# Patient Record
Sex: FEMALE | Race: WHITE | NOT HISPANIC OR LATINO | Employment: FULL TIME | URBAN - METROPOLITAN AREA
[De-identification: names, ages, dates, MRNs, and addresses within clinical notes are randomized per-mention and may not be internally consistent; named-entity substitution may affect disease eponyms.]

---

## 2017-04-13 ENCOUNTER — OFFICE VISIT (OUTPATIENT)
Dept: FAMILY MEDICINE | Facility: CLINIC | Age: 37
End: 2017-04-13
Payer: COMMERCIAL

## 2017-04-13 VITALS
OXYGEN SATURATION: 98 % | HEART RATE: 95 BPM | BODY MASS INDEX: 22.57 KG/M2 | DIASTOLIC BLOOD PRESSURE: 55 MMHG | SYSTOLIC BLOOD PRESSURE: 105 MMHG | HEIGHT: 71 IN | TEMPERATURE: 97.2 F | WEIGHT: 161.2 LBS

## 2017-04-13 DIAGNOSIS — R07.0 THROAT PAIN: Primary | ICD-10-CM

## 2017-04-13 DIAGNOSIS — J45.20 INTERMITTENT ASTHMA, UNCOMPLICATED: ICD-10-CM

## 2017-04-13 DIAGNOSIS — R05.9 COUGH: ICD-10-CM

## 2017-04-13 DIAGNOSIS — J34.89 SINUS PRESSURE: ICD-10-CM

## 2017-04-13 LAB
DEPRECATED S PYO AG THROAT QL EIA: NORMAL
MICRO REPORT STATUS: NORMAL
SPECIMEN SOURCE: NORMAL

## 2017-04-13 PROCEDURE — 87880 STREP A ASSAY W/OPTIC: CPT | Performed by: PHYSICIAN ASSISTANT

## 2017-04-13 PROCEDURE — 87081 CULTURE SCREEN ONLY: CPT | Performed by: PHYSICIAN ASSISTANT

## 2017-04-13 PROCEDURE — 99213 OFFICE O/P EST LOW 20 MIN: CPT | Performed by: PHYSICIAN ASSISTANT

## 2017-04-13 RX ORDER — DOXYCYCLINE 100 MG/1
100 CAPSULE ORAL 2 TIMES DAILY
Qty: 20 CAPSULE | Refills: 0 | Status: SHIPPED | OUTPATIENT
Start: 2017-04-13 | End: 2017-04-23

## 2017-04-13 RX ORDER — PREDNISONE 20 MG/1
40 TABLET ORAL DAILY
Qty: 10 TABLET | Refills: 0 | Status: SHIPPED | OUTPATIENT
Start: 2017-04-13 | End: 2017-04-18

## 2017-04-13 RX ORDER — CODEINE PHOSPHATE AND GUAIFENESIN 10; 100 MG/5ML; MG/5ML
1-2 SOLUTION ORAL EVERY 4 HOURS PRN
Qty: 120 ML | Refills: 0 | Status: SHIPPED | OUTPATIENT
Start: 2017-04-13 | End: 2017-08-27

## 2017-04-13 NOTE — NURSING NOTE
"Chief Complaint   Patient presents with     URI       Initial /55  Pulse 95  Temp 97.2  F (36.2  C) (Tympanic)  Ht 5' 11\" (1.803 m)  Wt 161 lb 3.2 oz (73.1 kg)  BMI 22.48 kg/m2 Estimated body mass index is 22.48 kg/(m^2) as calculated from the following:    Height as of this encounter: 5' 11\" (1.803 m).    Weight as of this encounter: 161 lb 3.2 oz (73.1 kg).  Medication Reconciliation: complete    "

## 2017-04-13 NOTE — PROGRESS NOTES
SUBJECTIVE:                                                    Era Garland is a 37 year old female who presents to clinic today for the following health issues:    ENT Symptoms             Symptoms: cc Present Absent Comment   Fever/Chills  x  Hasn't checked temp. Chills the last few days with sweats   Fatigue  x     Muscle Aches  x     Eye Irritation   x    Sneezing  x     Nasal Garrett/Drg  x  plugged   Sinus Pressure/Pain  x     Loss of smell  x     Dental pain  x  some   Sore Throat x x     Swollen Glands  x     Ear Pain/Fullness  x     Cough x x  productive   Wheeze  x     Chest Pain  x     Shortness of breath  x     Rash   x    Other  x  headache     Symptom duration:  1 week    Symptom severity:  severe   Treatments tried:  Musinex DM, Flonase, sinus rinse iburpofen - no ibuprofen this morning   Contacts:  work     Had tonsils out    Problem list and histories reviewed & adjusted, as indicated.  Additional history: as documented    Patient Active Problem List   Diagnosis     CARDIOVASCULAR SCREENING; LDL GOAL LESS THAN 160     Intermittent asthma     Lactose intolerance     Moderate major depression (H)     Axillary mass     IUD (intrauterine device) in place     Past Surgical History:   Procedure Laterality Date     LAPAROSCOPIC APPENDECTOMY  6/20/2012    Procedure: LAPAROSCOPIC APPENDECTOMY;  Laparoscopic Appendectomy;  Surgeon: Jorge Luis Tan MD;  [facility]: [facility], CERVICAL  age 24     MOUTH SURGERY      wisdom teeth       Social History   Substance Use Topics     Smoking status: Never Smoker     Smokeless tobacco: Never Used     Alcohol use Yes      Comment: 3 drinks weekly- quit with pregnancy     Family History   Problem Relation Age of Onset     Depression Mother      GASTROINTESTINAL DISEASE Mother      Allergies Father      Eye Disorder Father      Hypertension Father      OSTEOPOROSIS Paternal Grandmother      Hypertension Paternal Grandmother      Alzheimer Disease Paternal  "Grandfather      Asthma Brother      Depression Brother      Cardiovascular Maternal Grandfather      MI     Alcohol/Drug Maternal Grandmother            Reviewed and updated as needed this visit by clinical staff  Tobacco  Allergies  Meds  Problems  Med Hx  Surg Hx  Fam Hx  Soc Hx        Reviewed and updated as needed this visit by Provider  Allergies  Meds  Problems         ROS:  Other than noted above, general, HEENT, respiratory, cardiac and gastrointestinal systems are negative.     OBJECTIVE:                                                    /55  Pulse 95  Temp 97.2  F (36.2  C) (Tympanic)  Ht 5' 11\" (1.803 m)  Wt 161 lb 3.2 oz (73.1 kg)  SpO2 98%  BMI 22.48 kg/m2  Body mass index is 22.48 kg/(m^2).  GENERAL: healthy, alert and no distress  EYES: Eyes grossly normal to inspection, PERRL and conjunctivae and sclerae normal  HENT: ear canals and TM's normal, nose and mouth without ulcers or lesions POSITIVE significant nasal congestion. Postnasal drip. No tonsils  NECK: no adenopathy, no asymmetry, masses, or scars and thyroid normal to palpation  RESP: lungs clear to auscultation - no rales, rhonchi or wheezes  CV: regular rate and rhythm, normal S1 S2, no S3 or S4, no murmur, click or rub, no peripheral edema and peripheral pulses strong  ABDOMEN: soft, nontender, no hepatosplenomegaly, no masses and bowel sounds normal  MS: no gross musculoskeletal defects noted, no edema    Patient well appearing, breathing easily in clinic, speaking in full sentences easily, no signs of cyanosis or respiratory distress.      ASSESSMENT/PLAN:                                                      ASSESSMENT/PLAN:      ICD-10-CM    1. Throat pain R07.0 Strep, Rapid Screen     Beta strep group A culture     predniSONE (DELTASONE) 20 MG tablet   2. Sinus pressure J34.89 predniSONE (DELTASONE) 20 MG tablet     doxycycline Monohydrate 100 MG CAPS   3. Intermittent asthma, uncomplicated J45.20 predniSONE " (DELTASONE) 20 MG tablet   4. Cough R05 guaiFENesin-codeine (ROBITUSSIN AC) 100-10 MG/5ML SOLN solution     doxycycline Monohydrate 100 MG CAPS       Patient Instructions   Have plenty of rest and fluids  Humidified air can be very helpful with cough  Use inhaler every 4-6 hours as needed during this illness  Use robitussin at night - can make you groggy  Use nasal spray Afrin OTC for 3-4 days in each nostril for congestion  Prednisone for inflammation - can stop after a few days if improved. Do not take other NSAIDs with this.  Use antibiotics if needed over the weekend if not improving  Follow up if worsening symptoms or if not improving  Be seen urgently if worsening breathing  Or difficulty swallowing    Jennifer Trent PA-C  [facility]

## 2017-04-13 NOTE — PATIENT INSTRUCTIONS
Have plenty of rest and fluids  Humidified air can be very helpful with cough  Use inhaler every 4-6 hours as needed during this illness  Use robitussin at night - can make you groggy  Use nasal spray Afrin OTC for 3-4 days in each nostril for congestion  Prednisone for inflammation - can stop after a few days if improved. Do not take other NSAIDs with this.  Use antibiotics if needed over the weekend if not improving  Follow up if worsening symptoms or if not improving  Be seen urgently if worsening breathing  Or difficulty swallowing

## 2017-04-13 NOTE — MR AVS SNAPSHOT
After Visit Summary   4/13/2017    Era Garland    MRN: [id number]           Patient Information     Date Of Birth          1980        Visit Information        Provider Department      4/13/2017 8:40 AM Jennifer Trent PA-C [facility]        Today's Diagnoses     Throat pain    -  1    Sinus pressure        Intermittent asthma, uncomplicated        Cough          Care Instructions    Have plenty of rest and fluids  Humidified air can be very helpful with cough  Use inhaler every 4-6 hours as needed during this illness  Use robitussin at night - can make you groggy  Use nasal spray Afrin OTC for 3-4 days in each nostril for congestion  Prednisone for inflammation - can stop after a few days if improved. Do not take other NSAIDs with this.  Use antibiotics if needed over the weekend if not improving  Follow up if worsening symptoms or if not improving  Be seen urgently if worsening breathing  Or difficulty swallowing        Follow-ups after your visit        Who to contact     Normal or non-critical lab and imaging results will be communicated to you by [employer], letter or phone within 4 business days after the clinic has received the results. If you do not hear from us within 7 days, please contact the clinic through [employer] or phone. If you have a critical or abnormal lab result, we will notify you by phone as soon as possible.  Submit refill requests through [employer] or call your pharmacy and they will forward the refill request to us. Please allow 3 business days for your refill to be completed.          If you need to speak with a  for additional information , please call: [phone]             Additional Information About Your Visit        [employer] Information     [employer] gives you secure access to your electronic health record. If you see a primary care provider, you can also send messages to your care team and make appointments. If you have questions, please  "call your primary care clinic.  If you do not have a primary care provider, please call [phone] and they will assist you.        Care EveryWhere ID     This is your Care EveryWhere ID. This could be used by other organizations to access your [facility] medical records  [id number]        Your Vitals Were     Pulse Temperature Height Pulse Oximetry BMI (Body Mass Index)       95 97.2  F (36.2  C) (Tympanic) 5' 11\" (1.803 m) 98% 22.48 kg/m2        Blood Pressure from Last 3 Encounters:   04/13/17 105/55   09/22/16 107/62   09/09/16 107/64    Weight from Last 3 Encounters:   04/13/17 161 lb 3.2 oz (73.1 kg)   09/22/16 156 lb (70.8 kg)   09/09/16 157 lb (71.2 kg)              We Performed the Following     Beta strep group A culture     Strep, Rapid Screen          Today's Medication Changes          These changes are accurate as of: 4/13/17  9:32 AM.  If you have any questions, ask your nurse or doctor.               Start taking these medicines.        Dose/Directions    doxycycline Monohydrate 100 MG Caps   Used for:  Cough, Sinus pressure   Started by:  Jennifer Trent PA-C        Dose:  100 mg   Take 1 capsule (100 mg) by mouth 2 times daily for 10 days   Quantity:  20 capsule   Refills:  0       guaiFENesin-codeine 100-10 MG/5ML Soln solution   Commonly known as:  ROBITUSSIN AC   Used for:  Cough   Started by:  Jennifer Trent PA-C        Dose:  1-2 tsp.   Take 5-10 mLs by mouth every 4 hours as needed   Quantity:  120 mL   Refills:  0       predniSONE 20 MG tablet   Commonly known as:  DELTASONE   Used for:  Sinus pressure, Throat pain, Intermittent asthma, uncomplicated   Started by:  Jennifer Trent PA-C        Dose:  40 mg   Take 2 tablets (40 mg) by mouth daily for 5 days   Quantity:  10 tablet   Refills:  0            Where to get your medicines      These medications were sent to [facility], [place] - [address]  [address] [address] [address]     Phone:  " [phone]     doxycycline Monohydrate 100 MG Caps    predniSONE 20 MG tablet         Some of these will need a paper prescription and others can be bought over the counter.  Ask your nurse if you have questions.     Bring a paper prescription for each of these medications     guaiFENesin-codeine 100-10 MG/5ML Soln solution                Primary Care Provider    None Specified       No primary provider on file.        Thank you!     Thank you for choosing [facility]  for your care. Our goal is always to provide you with excellent care. Hearing back from our patients is one way we can continue to improve our services. Please take a few minutes to complete the written survey that you may receive in the mail after your visit with us. Thank you!             Your Updated Medication List - Protect others around you: Learn how to safely use, store and throw away your medicines at www.disposemymeds.org.          This list is accurate as of: 4/13/17  9:32 AM.  Always use your most recent med list.                   Brand Name Dispense Instructions for use    ACIDOPHILUS PO      Take 1 tablet by mouth daily.       ALBUTEROL IN      AS NEEDED       ALPRAZolam 0.5 MG tablet    XANAX    40 tablet    Take 1 tablet (0.5 mg) by mouth 3 times daily as needed for anxiety       ammonium lactate 12 % cream    AMLACTIN    385 g    Apply  topically 2 times daily. To the arms.       Benzoyl Peroxide 5.3 % Foam     60 g    Externally apply 1 pump topically daily       * buPROPion 150 MG 12 hr tablet    WELLBUTRIN SR    180 tablet    Take 1 tablet (150 mg) by mouth 2 times daily       * buPROPion 300 MG 24 hr tablet    WELLBUTRIN XL    90 tablet    Take 1 tablet (300 mg) by mouth every morning       citalopram 40 MG tablet    celeXA    135 tablet    Take 1.5 tablets (60 mg) by mouth daily       clindamycin 1 % lotion    CLINDAMAX    60 mL    Apply topically 2 times daily       doxycycline Monohydrate 100 MG Caps     20 capsule     Take 1 capsule (100 mg) by mouth 2 times daily for 10 days       fluticasone 50 MCG/ACT spray    FLONASE    1 Bottle    Spray 2 sprays into both nostrils daily       fluticasone-salmeterol 500-50 MCG/DOSE diskus inhaler    ADVAIR     Inhale 1 puff into the lungs every 12 hours.       guaiFENesin-codeine 100-10 MG/5ML Soln solution    ROBITUSSIN AC    120 mL    Take 5-10 mLs by mouth every 4 hours as needed       ibuprofen 400 MG tablet    ADVIL/MOTRIN    120 tablet    Take 1-2 tablets by mouth every 6 hours as needed (cramping).       LANsoprazole 30 MG CR capsule    PREVACID    180 capsule    Take 1 capsule (30 mg) by mouth 2 times daily       * order for DME     1 Device    Equipment being ordered: seasonal affective disorder UV lamp       * order for DME     2 Device    Equipment being ordered: Seasonal affective disorder  UV lamp - 10,000 LUX Narrow Band Blue type of light.       * order for DME     2 Device    Equipment being ordered: seasonal affective disorder UV lamp       predniSONE 20 MG tablet    DELTASONE    10 tablet    Take 2 tablets (40 mg) by mouth daily for 5 days       SUDAFED 12 HOUR PO      Take 1 tablet by mouth once.       VITAMIN C PO      Take 1,000 mg by mouth daily.       VITAMIN D3 PO      Take 2,000 Units by mouth daily.       * Notice:  This list has 5 medication(s) that are the same as other medications prescribed for you. Read the directions carefully, and ask your doctor or other care provider to review them with you.

## 2017-04-14 ASSESSMENT — ASTHMA QUESTIONNAIRES: ACT_TOTALSCORE: 18

## 2017-04-15 LAB
BACTERIA SPEC CULT: NORMAL
MICRO REPORT STATUS: NORMAL
SPECIMEN SOURCE: NORMAL

## 2017-07-17 ENCOUNTER — E-VISIT (OUTPATIENT)
Dept: OBGYN | Facility: CLINIC | Age: 37
End: 2017-07-17
Payer: COMMERCIAL

## 2017-07-17 DIAGNOSIS — N30.00 ACUTE CYSTITIS WITHOUT HEMATURIA: Primary | ICD-10-CM

## 2017-07-17 PROCEDURE — 99444 ZZC PHYSICIAN ONLINE EVALUATION & MANAGEMENT SERVICE: CPT | Performed by: OBSTETRICS & GYNECOLOGY

## 2017-07-17 RX ORDER — NITROFURANTOIN 25; 75 MG/1; MG/1
100 CAPSULE ORAL 2 TIMES DAILY
Qty: 14 CAPSULE | Refills: 0 | Status: SHIPPED | OUTPATIENT
Start: 2017-07-17 | End: 2017-08-27

## 2017-08-27 ENCOUNTER — HOSPITAL ENCOUNTER (EMERGENCY)
Facility: CLINIC | Age: 37
Discharge: HOME | End: 2017-08-27
Attending: EMERGENCY MEDICINE | Admitting: EMERGENCY MEDICINE
Payer: COMMERCIAL

## 2017-08-27 ENCOUNTER — APPOINTMENT (OUTPATIENT)
Dept: GENERAL RADIOLOGY | Facility: CLINIC | Age: 37
End: 2017-08-27
Attending: EMERGENCY MEDICINE
Payer: COMMERCIAL

## 2017-08-27 VITALS
DIASTOLIC BLOOD PRESSURE: 70 MMHG | RESPIRATION RATE: 10 BRPM | TEMPERATURE: 98.9 F | SYSTOLIC BLOOD PRESSURE: 113 MMHG | OXYGEN SATURATION: 95 %

## 2017-08-27 DIAGNOSIS — R00.2 PALPITATIONS: ICD-10-CM

## 2017-08-27 DIAGNOSIS — R07.9 CHEST PAIN, UNSPECIFIED TYPE: ICD-10-CM

## 2017-08-27 LAB
ANION GAP SERPL CALCULATED.3IONS-SCNC: 7 MMOL/L (ref 3–14)
BASOPHILS # BLD AUTO: 0.1 10E9/L (ref 0–0.2)
BASOPHILS NFR BLD AUTO: 1.1 %
BUN SERPL-MCNC: 11 MG/DL (ref 7–30)
CALCIUM SERPL-MCNC: 8.7 MG/DL (ref 8.5–10.1)
CHLORIDE SERPL-SCNC: 108 MMOL/L (ref 94–109)
CO2 SERPL-SCNC: 28 MMOL/L (ref 20–32)
CREAT SERPL-MCNC: 0.78 MG/DL (ref 0.52–1.04)
DIFFERENTIAL METHOD BLD: NORMAL
EOSINOPHIL # BLD AUTO: 0.1 10E9/L (ref 0–0.7)
EOSINOPHIL NFR BLD AUTO: 1.5 %
ERYTHROCYTE [DISTWIDTH] IN BLOOD BY AUTOMATED COUNT: 11.4 % (ref 10–15)
GFR SERPL CREATININE-BSD FRML MDRD: 83 ML/MIN/1.7M2
GLUCOSE SERPL-MCNC: 95 MG/DL (ref 70–99)
HCT VFR BLD AUTO: 37.4 % (ref 35–47)
HGB BLD-MCNC: 12.3 G/DL (ref 11.7–15.7)
IMM GRANULOCYTES # BLD: 0 10E9/L (ref 0–0.4)
IMM GRANULOCYTES NFR BLD: 0.3 %
LYMPHOCYTES # BLD AUTO: 1.6 10E9/L (ref 0.8–5.3)
LYMPHOCYTES NFR BLD AUTO: 22.6 %
MCH RBC QN AUTO: 31.1 PG (ref 26.5–33)
MCHC RBC AUTO-ENTMCNC: 32.9 G/DL (ref 31.5–36.5)
MCV RBC AUTO: 94 FL (ref 78–100)
MONOCYTES # BLD AUTO: 0.8 10E9/L (ref 0–1.3)
MONOCYTES NFR BLD AUTO: 10.9 %
NEUTROPHILS # BLD AUTO: 4.6 10E9/L (ref 1.6–8.3)
NEUTROPHILS NFR BLD AUTO: 63.6 %
PLATELET # BLD AUTO: 228 10E9/L (ref 150–450)
POTASSIUM SERPL-SCNC: 4.3 MMOL/L (ref 3.4–5.3)
RBC # BLD AUTO: 3.96 10E12/L (ref 3.8–5.2)
SODIUM SERPL-SCNC: 143 MMOL/L (ref 133–144)
TROPONIN I SERPL-MCNC: <0.015 UG/L (ref 0–0.04)
TROPONIN I SERPL-MCNC: <0.015 UG/L (ref 0–0.04)
WBC # BLD AUTO: 7.3 10E9/L (ref 4–11)

## 2017-08-27 PROCEDURE — 25000132 ZZH RX MED GY IP 250 OP 250 PS 637: Performed by: EMERGENCY MEDICINE

## 2017-08-27 PROCEDURE — 99285 EMERGENCY DEPT VISIT HI MDM: CPT | Mod: 25

## 2017-08-27 PROCEDURE — 84484 ASSAY OF TROPONIN QUANT: CPT | Performed by: EMERGENCY MEDICINE

## 2017-08-27 PROCEDURE — 93010 ELECTROCARDIOGRAM REPORT: CPT | Performed by: EMERGENCY MEDICINE

## 2017-08-27 PROCEDURE — 71020 XR CHEST 2 VW: CPT

## 2017-08-27 PROCEDURE — 93005 ELECTROCARDIOGRAM TRACING: CPT

## 2017-08-27 PROCEDURE — 80048 BASIC METABOLIC PNL TOTAL CA: CPT | Performed by: EMERGENCY MEDICINE

## 2017-08-27 PROCEDURE — 99284 EMERGENCY DEPT VISIT MOD MDM: CPT | Mod: 25 | Performed by: EMERGENCY MEDICINE

## 2017-08-27 PROCEDURE — 85025 COMPLETE CBC W/AUTO DIFF WBC: CPT | Performed by: EMERGENCY MEDICINE

## 2017-08-27 RX ORDER — ASPIRIN 81 MG/1
324 TABLET, CHEWABLE ORAL ONCE
Status: COMPLETED | OUTPATIENT
Start: 2017-08-27 | End: 2017-08-27

## 2017-08-27 RX ADMIN — ASPIRIN 81 MG 324 MG: 81 TABLET ORAL at 18:16

## 2017-08-27 ASSESSMENT — ENCOUNTER SYMPTOMS
SHORTNESS OF BREATH: 0
CHEST TIGHTNESS: 1
PALPITATIONS: 1

## 2017-08-27 NOTE — ED PROVIDER NOTES
"  History     Chief Complaint   Patient presents with     Chest Pain     HPI  Era Garland is a 37 year old female who has a history of anxiety, intermittent asthma, GERD, IBS, and HPV who presents to the ED for evaluation of chest pain. Patient reports that she has had a few weeks of intermittent palpitations and \"chest tightness sensation\". She felt that these were increasing, so she started to write them down but then these episodes. She notes one episode of this last week. She had no pain with these episodes.   Today, she reports that at about 15:00 she got some pain under her left breast. This pain radiated into her left shoulder as well. She had chest tightness at this time as well. Patient then went to her cardio work out class, and felt that her chest tightness was slightly increased. She also notes that she was feeling mentally \"fuzzy\". Her friend noted that she was looking pale at her work out class as well. Patient then sat down, and her symptoms started to improved slightly, but not completely. Here in the ED, patient reports that her pain is still present but it is a 1/10. She reports that taking a deep breath will \"feel funny\" but she will not have pain. She did not take an aspirin for this pain.   She reports family history of hyperlipidemia. She has no family history of heart disease. She has no personal history of heart disease, diabetes, or hyperlipidemia. She does not smoke cigarettes. Patient notes that while she was pregnant with her first daughter, she had many palpitations for which she had a holter monitor. Patient denies leg swelling and shortness of breath.     Patient Active Problem List   Diagnosis     CARDIOVASCULAR SCREENING; LDL GOAL LESS THAN 160     Intermittent asthma     Lactose intolerance     Moderate major depression (H)     Axillary mass     IUD (intrauterine device) in place     Current Outpatient Prescriptions   Medication Sig Dispense Refill     nitroFURantoin, " macrocrystal-monohydrate, (MACROBID) 100 MG capsule Take 1 capsule (100 mg) by mouth 2 times daily 14 capsule 0     guaiFENesin-codeine (ROBITUSSIN AC) 100-10 MG/5ML SOLN solution Take 5-10 mLs by mouth every 4 hours as needed 120 mL 0     buPROPion (WELLBUTRIN XL) 300 MG 24 hr tablet Take 1 tablet (300 mg) by mouth every morning 90 tablet 3     fluticasone (FLONASE) 50 MCG/ACT nasal spray Spray 2 sprays into both nostrils daily 1 Bottle 3     citalopram (CELEXA) 40 MG tablet Take 1.5 tablets (60 mg) by mouth daily 135 tablet 3     LANsoprazole (PREVACID) 30 MG capsule Take 1 capsule (30 mg) by mouth 2 times daily 180 capsule 3     buPROPion (WELLBUTRIN SR) 150 MG 12 hr tablet Take 1 tablet (150 mg) by mouth 2 times daily (Patient not taking: Reported on 4/13/2017) 180 tablet 0     order for DME Equipment being ordered: seasonal affective disorder UV lamp 2 Device 0     order for DME Equipment being ordered: Seasonal affective disorder  UV lamp - 10,000 LUX Narrow Band Blue type of light. 2 Device 0     order for DME Equipment being ordered: seasonal affective disorder UV lamp 1 Device 0     ALPRAZolam (XANAX) 0.5 MG tablet Take 1 tablet (0.5 mg) by mouth 3 times daily as needed for anxiety 40 tablet 1     Benzoyl Peroxide 5.3 % FOAM Externally apply 1 pump topically daily (Patient not taking: Reported on 4/13/2017) 60 g 3     clindamycin (CLINDAMAX) 1 % lotion Apply topically 2 times daily (Patient not taking: Reported on 4/13/2017) 60 mL 11     ibuprofen (ADVIL,MOTRIN) 400 MG tablet Take 1-2 tablets by mouth every 6 hours as needed (cramping). 120 tablet 0     Ascorbic Acid (VITAMIN C PO) Take 1,000 mg by mouth daily.       fluticasone-salmeterol (ADVAIR) 500-50 MCG/DOSE diskus inhaler Inhale 1 puff into the lungs every 12 hours.       Cholecalciferol (VITAMIN D3 PO) Take 2,000 Units by mouth daily.       Pseudoephedrine HCl (SUDAFED 12 HOUR PO) Take 1 tablet by mouth once.       Lactobacillus (ACIDOPHILUS PO)  Take 1 tablet by mouth daily.       ammonium lactate (AMLACTIN) 12 % cream Apply  topically 2 times daily. To the arms. (Patient not taking: Reported on 4/13/2017) 385 g 11     ALBUTEROL IN AS NEEDED       Allergies   Allergen Reactions     Diflucan [Fluconazole] Rash     Augmentin Diarrhea       I have reviewed the Medications, Allergies, Past Medical and Surgical History, and Social History in the Epic system.    Review of Systems   Constitutional: Negative for activity change, appetite change, chills, fatigue and fever.   HENT: Negative for congestion and trouble swallowing.    Respiratory: Positive for chest tightness. Negative for cough and shortness of breath.    Cardiovascular: Positive for chest pain and palpitations. Negative for leg swelling.   Gastrointestinal: Negative for abdominal pain, nausea and vomiting.   Genitourinary: Negative for decreased urine volume and dysuria.   Musculoskeletal: Negative for back pain, myalgias and neck pain.   Skin: Negative for rash.   Neurological: Negative for dizziness, weakness, light-headedness and headaches.   Psychiatric/Behavioral: Negative for confusion.       Physical Exam   BP: 118/74  Heart Rate: 77  Temp: 98.8  F (37.1  C)  Resp: 16  SpO2: 98 %  Physical Exam   Constitutional: She appears well-developed and well-nourished. No distress.   Psychiatric:   Patient appear anxious.   Nursing note and vitals reviewed.    HENT: Oral mucosa moist. No lesions.  Neck: Supple  Pulmonary/Chest: Lungs are clear to auscultation bilaterally.  Cardiovascular: Heart is regular rate and rhythm. No murmur. No reproducible tenderness on palpation in anterior chest.  Abdomen: Soft, non-distended, non-tender.   Musculoskeletal: Moving all extremities well. No peripheral edema.   Neurological: Alert. No focal neurologic deficit.   Skin: No rash.    ED Course     ED Course     Procedures             EKG Interpretation:      Interpreted by Liang Huber  Rhythm: normal sinus    Rate: Normal  Axis: Normal  Ectopy: none  Conduction:Shortened MN interval  ST Segments/ T Waves: No ST-T wave changes  Q Waves: none  Comparison to prior: No old EKG available    Clinical Impression: Sinus rhythm with mildly shortened pr interval                  Critical Care time:  none             Labs Ordered and Resulted from Time of ED Arrival Up to the Time of Departure from the ED   CBC WITH PLATELETS DIFFERENTIAL   BASIC METABOLIC PANEL   TROPONIN I   TROPONIN I     Results for orders placed or performed during the hospital encounter of 08/27/17   Chest XR,  PA & LAT    Narrative    CHEST TWO VIEW 8/27/2017 6:52 PM     HISTORY: Chest pain.    COMPARISON: None.      Impression    IMPRESSION: No active cardiopulmonary disease.    ERBEKAH BADILLO MD       Results for orders placed or performed during the hospital encounter of 08/27/17 (from the past 24 hour(s))   Chest XR,  PA & LAT    Narrative    CHEST TWO VIEW 8/27/2017 6:52 PM     HISTORY: Chest pain.    COMPARISON: None.      Impression    IMPRESSION: No active cardiopulmonary disease.    REBEKAH BADILLO MD   Troponin I   Result Value Ref Range    Troponin I ES <0.015 0.000 - 0.045 ug/L       Medications   aspirin chewable tablet 324 mg (324 mg Oral Given 8/27/17 1816)       5:54 PM Patient assessed.    Assessments & Plan (with Medical Decision Making)records were reviewed.She was given 4 baby aspirin. Labs and CXR were obtained. Labs without any abnormality troponin was negative. CXR without abnormality. Patient was monitored and no arrhythmias were noted. She denies any pain at this time. Patient has no cardiac risk factors. Three hours after the first troponin a second was obtained and was unremarkable. Findings were discussed with the patient. She will be placed on a zio patch and will follow up with her primary care physician after the monitoring to assessment and consider stress test set up. She will return if symptoms return or new symptoms  develop. I have considered numerous causes of chest pain including; ACS, MI, pneumonia, arrhythmia, pneumothorax, pericarditis, PE, pleurisy, myocarditis, PUD, GERD, musculoskeletal, etc..     I have reviewed the nursing notes.    I have reviewed the findings, diagnosis, plan and need for follow up with the patient.       New Prescriptions    No medications on file       Final diagnoses:   Chest pain, unspecified type   Palpitations     This document serves as a record of the services and decisions personally performed and made by Liang Huber MD. It was created on HIS/HER behalf by   Rachel Mederos, a trained medical scribe. The creation of this document is based the provider's statements to the medical scribe.  Rachel Mederos 5:54 PM 8/27/2017    Provider:   The information in this document, created by the medical scribe for me, accurately reflects the services I personally performed and the decisions made by me. I have reviewed and approved this document for accuracy prior to leaving the patient care area.  Liang Huber MD 5:54 PM 8/27/2017 8/27/2017   [facility] EMERGENCY DEPARTMENT     Liang Huber MD  08/28/17 [id number]

## 2017-08-27 NOTE — ED AVS SNAPSHOT
[facility] Emergency Department    [address] [facility] [address]    Phone:  [phone]    Fax:  [phone]                                       Era Garland   MRN: [id number]    Department:  [facility] Emergency Department   Date of Visit:  8/27/2017           Patient Information     Date Of Birth          1980        Your diagnoses for this visit were:     Chest pain, unspecified type     Palpitations        You were seen by Liang Huber MD.      Follow-up Information     Follow up with Sabiha Chavarria MD.    Specialty:  OB/Gyn    Why:  As needed    Contact information:    [address] [place] [address]  [phone]          Follow up with [facility] Emergency Department.    Specialty:  EMERGENCY MEDICINE    Why:  If symptoms worsen    Contact information:    [address] [place] [phone]    Additional information:    The medical center is located at   [address] [place] [address] (between [address]   [address] in [place], [place] miles north   of [place]).        Discharge Instructions          Return if symptoms worsen or new symptoms develop.  Follow-up with primary care after the Zio patch.  If return of abdominal pain, palpitations fever chills nausea vomiting please return for further evaluation and care.  *CHEST PAIN, UNCERTAIN CAUSE    Based on your exam today, the exact cause of your chest pain is not certain. Your condition does not seem serious at this time, and your pain does not appear to be coming from your heart. However, sometimes the signs of a serious problem take more time to appear. Therefore, watch for the warning signs listed below.  HOME CARE:  1. Rest today and avoid strenuous activity.  2. Take any prescribed medicine as directed.  FOLLOW UP with your doctor in 1-3 days.   GET PROMPT MEDICAL ATTENTION if any of the following occur:    A change in the type of pain: if it feels  different, becomes more severe, lasts longer, or begins to spread into your shoulder, arm, neck, jaw or back    Shortness of breath or increased pain with breathing    Weakness, dizziness, or fainting    Cough with blood or dark colored sputum (phlegm)    Fever over 101  F (38.3  C)    Swelling, pain or redness in one leg    3153-7701 [employer], [address] [place] [address], [place] [address]. All rights reserved. This information is not intended as a substitute for professional medical care. Always follow your healthcare professional's instructions.      Understanding Heart Palpitations    Heart palpitations are a symptom. It s the feeling you have when your heartbeat seems to be racing, pounding, skipping, or fluttering. Heart palpitations are most often felt in the chest. Sometimes, they may also be felt in the neck.  What causes heart palpitations?  In most cases, heart palpitations are caused by:    Stress or anxiety    Exercise    Pregnancy    Some medicines    Caffeine    Nicotine    Alcohol    Illegal drugs, such as cocaine    Health problems, such as anemia or overactive thyroid  In some cases, heart palpitations may be caused by a problem with the heart. Abnormal heart rhythms (arrhythmias) are the main concern. They may need to be managed by you and your healthcare provider or treated right away.  How are heart palpitations treated?  Treatments for heart palpitations depend on the cause. Options may include:    Managing the things that trigger your heart palpitations. This could mean:    Learning ways to reduce stress and anxiety    Avoiding caffeine, nicotine, alcohol, or illegal drugs    Stopping the use of certain medicines, under your doctor s guidance    Medicines, procedures, or surgery to treat an arrhythmia or other health problem that is causing your symptoms  What are the complications of heart palpitations?  Complications of heart palpitations are rare unless they are caused by a problem such  as an arrhythmia. In such cases, complications can include:    Fainting    Heart failure. This problem occurs when the heart is so weak it no longer pumps blood well.    Blood clots and stroke    Sudden cardiac arrest. This problem occurs when the heart suddenly stops beating.  When should I call my healthcare provider?  Call your healthcare provider right away if you have any of these:    Fever of 100.4 F (38 C) or higher, or as directed    Symptoms that don t get better with treatment, or symptoms that get worse    New symptoms, such as chest pain, shortness of breath, dizziness, or fainting   Date Last Reviewed: 5/1/2016 2000-2017 [employer]. [address] [place] [address], [place] [address]. All rights reserved. This information is not intended as a substitute for professional medical care. Always follow your healthcare professional's instructions.          24 Hour Appointment Hotline       To make an appointment at any [facility] call [phone] ([phone]). If you don't have a family doctor or clinic, we will help you find one. [facility] are conveniently located to serve the needs of you and your family.          ED Discharge Orders     Zio Patch 48 Hours                    Review of your medicines      Our records show that you are taking the medicines listed below. If these are incorrect, please call your family doctor or clinic.        Dose / Directions Last dose taken    ACIDOPHILUS PO   Dose:  1 tablet        Take 1 tablet by mouth daily.   Refills:  0        ALBUTEROL IN        AS NEEDED   Refills:  0        ALPRAZolam 0.5 MG tablet   Commonly known as:  XANAX   Dose:  0.5 mg   Quantity:  40 tablet        Take 1 tablet (0.5 mg) by mouth 3 times daily as needed for anxiety   Refills:  1        buPROPion 300 MG 24 hr tablet   Commonly known as:  WELLBUTRIN XL   Dose:  300 mg   Quantity:  90 tablet        Take 1 tablet (300 mg) by mouth every morning   Refills:  3         calcium carbonate-vitamin D 600-400 MG-UNIT Chew   Dose:  1 chew tab        Take 1 chew tab by mouth daily   Refills:  0        citalopram 40 MG tablet   Commonly known as:  celeXA   Dose:  60 mg   Quantity:  135 tablet        Take 1.5 tablets (60 mg) by mouth daily   Refills:  3        fluticasone 50 MCG/ACT spray   Commonly known as:  FLONASE   Dose:  2 spray   Quantity:  1 Bottle        Spray 2 sprays into both nostrils daily   Refills:  3        fluticasone-salmeterol 500-50 MCG/DOSE diskus inhaler   Commonly known as:  ADVAIR   Dose:  1 puff        Inhale 1 puff into the lungs every 12 hours.   Refills:  0        ibuprofen 400 MG tablet   Commonly known as:  ADVIL/MOTRIN   Dose:  400-800 mg   Quantity:  120 tablet        Take 1-2 tablets by mouth every 6 hours as needed (cramping).   Refills:  0        LANsoprazole 30 MG CR capsule   Commonly known as:  PREVACID   Dose:  30 mg   Quantity:  180 capsule        Take 1 capsule (30 mg) by mouth 2 times daily   Refills:  3        order for DME   Quantity:  2 Device        Equipment being ordered: seasonal affective disorder UV lamp   Refills:  0        SUDAFED 12 HOUR PO   Dose:  1 tablet        Take 1 tablet by mouth once.   Refills:  0        VITAMIN C PO   Dose:  1000 mg        Take 1,000 mg by mouth daily.   Refills:  0        VITAMIN D3 PO   Dose:  2000 Units        Take 2,000 Units by mouth daily.   Refills:  0                Procedures and tests performed during your visit     Procedure/Test Number of Times Performed    Basic metabolic panel 1    CBC with platelets differential 1    Chest XR,  PA & LAT 1    EKG 12 lead 1    Troponin I 2      Orders Needing Specimen Collection     None      Pending Results     Date and Time Order Name Status Description    8/27/2017 1808 Chest XR,  PA & LAT Preliminary             Pending Culture Results     No orders found from 8/25/2017 to 8/28/2017.            Pending Results Instructions     If you had any lab results that  were not finalized at the time of your Discharge, you can call the ED Lab Result RN at [phone]. You will be contacted by this team for any positive Lab results or changes in treatment. The nurses are available 7 days a week from 10A to 6:30P.  You can leave a message 24 hours per day and they will return your call.        Test Results From Your Hospital Stay        8/27/2017  6:04 PM      Component Results     Component Value Ref Range & Units Status    WBC 7.3 4.0 - 11.0 10e9/L Final    RBC Count 3.96 3.8 - 5.2 10e12/L Final    Hemoglobin 12.3 11.7 - 15.7 g/dL Final    Hematocrit 37.4 35.0 - 47.0 % Final    MCV 94 78 - 100 fl Final    MCH 31.1 26.5 - 33.0 pg Final    MCHC 32.9 31.5 - 36.5 g/dL Final    RDW 11.4 10.0 - 15.0 % Final    Platelet Count 228 150 - 450 10e9/L Final    Diff Method Automated Method  Final    % Neutrophils 63.6 % Final    % Lymphocytes 22.6 % Final    % Monocytes 10.9 % Final    % Eosinophils 1.5 % Final    % Basophils 1.1 % Final    % Immature Granulocytes 0.3 % Final    Absolute Neutrophil 4.6 1.6 - 8.3 10e9/L Final    Absolute Lymphocytes 1.6 0.8 - 5.3 10e9/L Final    Absolute Monocytes 0.8 0.0 - 1.3 10e9/L Final    Absolute Eosinophils 0.1 0.0 - 0.7 10e9/L Final    Absolute Basophils 0.1 0.0 - 0.2 10e9/L Final    Abs Immature Granulocytes 0.0 0 - 0.4 10e9/L Final         8/27/2017  6:18 PM      Component Results     Component Value Ref Range & Units Status    Sodium 143 133 - 144 mmol/L Final    Potassium 4.3 3.4 - 5.3 mmol/L Final    Chloride 108 94 - 109 mmol/L Final    Carbon Dioxide 28 20 - 32 mmol/L Final    Anion Gap 7 3 - 14 mmol/L Final    Glucose 95 70 - 99 mg/dL Final    Urea Nitrogen 11 7 - 30 mg/dL Final    Creatinine 0.78 0.52 - 1.04 mg/dL Final    GFR Estimate 83 >60 mL/min/1.7m2 Final    Non  GFR Calc    GFR Estimate If Black >90 >60 mL/min/1.7m2 Final    African American GFR Calc    Calcium 8.7 8.5 - 10.1 mg/dL Final         8/27/2017  6:19 PM       Component Results     Component Value Ref Range & Units Status    Troponin I ES <0.015 0.000 - 0.045 ug/L Final    The 99th percentile for upper reference range is 0.045 ug/L.  Troponin values   in the range of 0.045 - 0.120 ug/L may be associated with risks of adverse   clinical events.           8/27/2017  7:02 PM      Narrative     CHEST TWO VIEW 8/27/2017 6:52 PM     HISTORY: Chest pain.    COMPARISON: None.        Impression     IMPRESSION: No active cardiopulmonary disease.         8/27/2017  9:06 PM      Component Results     Component Value Ref Range & Units Status    Troponin I ES <0.015 0.000 - 0.045 ug/L Final    The 99th percentile for upper reference range is 0.045 ug/L.  Troponin values   in the range of 0.045 - 0.120 ug/L may be associated with risks of adverse   clinical events.                  Thank you for choosing [facility]       Thank you for choosing [facility] for your care. Our goal is always to provide you with excellent care. Hearing back from our patients is one way we can continue to improve our services. Please take a few minutes to complete the written survey that you may receive in the mail after you visit with us. Thank you!        [facility] [employer] Information     [employer] gives you secure access to your electronic health record. If you see a primary care provider, you can also send messages to your care team and make appointments. If you have questions, please call your primary care clinic.  If you do not have a primary care provider, please call [phone] and they will assist you.        Care EveryWhere ID     This is your Care EveryWhere ID. This could be used by other organizations to access your [facility] medical records  [id number]        Equal Access to Services     ANJUM CHOW : Taniya King, camryn anderson, qaignacia kabrianna hays. So [facility] [phone].    ATENCIÓN: Si habla español, tiene a dover disposición servicios  segundo de asistencia lingüística. Yassine sesay [phone].    We comply with applicable federal civil rights laws and [place] laws. We do not discriminate on the basis of race, color, national origin, age, disability sex, sexual orientation or gender identity.            After Visit Summary       This is your record. Keep this with you and show to your community pharmacist(s) and doctor(s) at your next visit.

## 2017-08-27 NOTE — ED NOTES
Patient states that 3:00 today she felt a pressure or tightness in her chest. The pain resolved and she decided to go work out, while working out she developed a tightness all through out her chest and she felt like her heart rate was irregular. A friend told her she looked pale. She denies nausea or diaphoresis.

## 2017-08-28 ASSESSMENT — ENCOUNTER SYMPTOMS
BACK PAIN: 0
TROUBLE SWALLOWING: 0
DIZZINESS: 0
FATIGUE: 0
LIGHT-HEADEDNESS: 0
MYALGIAS: 0
DYSURIA: 0
CHILLS: 0
COUGH: 0
APPETITE CHANGE: 0
VOMITING: 0
ABDOMINAL PAIN: 0
HEADACHES: 0
NAUSEA: 0
NECK PAIN: 0
FEVER: 0
WEAKNESS: 0
ACTIVITY CHANGE: 0
CONFUSION: 0

## 2017-08-28 NOTE — DISCHARGE INSTRUCTIONS
Return if symptoms worsen or new symptoms develop.  Follow-up with primary care after the Zio patch.  If return of abdominal pain, palpitations fever chills nausea vomiting please return for further evaluation and care.  *CHEST PAIN, UNCERTAIN CAUSE    Based on your exam today, the exact cause of your chest pain is not certain. Your condition does not seem serious at this time, and your pain does not appear to be coming from your heart. However, sometimes the signs of a serious problem take more time to appear. Therefore, watch for the warning signs listed below.  HOME CARE:  1. Rest today and avoid strenuous activity.  2. Take any prescribed medicine as directed.  FOLLOW UP with your doctor in 1-3 days.   GET PROMPT MEDICAL ATTENTION if any of the following occur:    A change in the type of pain: if it feels different, becomes more severe, lasts longer, or begins to spread into your shoulder, arm, neck, jaw or back    Shortness of breath or increased pain with breathing    Weakness, dizziness, or fainting    Cough with blood or dark colored sputum (phlegm)    Fever over 101  F (38.3  C)    Swelling, pain or redness in one leg    1450-9121 [place] [address]. All rights reserved. This information is not intended as a substitute for professional medical care. Always follow your healthcare professional's instructions.      Understanding Heart Palpitations    Heart palpitations are a symptom. It s the feeling you have when your heartbeat seems to be racing, pounding, skipping, or fluttering. Heart palpitations are most often felt in the chest. Sometimes, they may also be felt in the neck.  What causes heart palpitations?  In most cases, heart palpitations are caused by:    Stress or anxiety    Exercise    Pregnancy    Some medicines    Caffeine    Nicotine    Alcohol    Illegal drugs, such as cocaine    Health problems, such as anemia or overactive thyroid  In some cases,  heart palpitations may be caused by a problem with the heart. Abnormal heart rhythms (arrhythmias) are the main concern. They may need to be managed by you and your healthcare provider or treated right away.  How are heart palpitations treated?  Treatments for heart palpitations depend on the cause. Options may include:    Managing the things that trigger your heart palpitations. This could mean:    Learning ways to reduce stress and anxiety    Avoiding caffeine, nicotine, alcohol, or illegal drugs    Stopping the use of certain medicines, under your doctor s guidance    Medicines, procedures, or surgery to treat an arrhythmia or other health problem that is causing your symptoms  What are the complications of heart palpitations?  Complications of heart palpitations are rare unless they are caused by a problem such as an arrhythmia. In such cases, complications can include:    Fainting    Heart failure. This problem occurs when the heart is so weak it no longer pumps blood well.    Blood clots and stroke    Sudden cardiac arrest. This problem occurs when the heart suddenly stops beating.  When should I call my healthcare provider?  Call your healthcare provider right away if you have any of these:    Fever of 100.4 F (38 C) or higher, or as directed    Symptoms that don t get better with treatment, or symptoms that get worse    New symptoms, such as chest pain, shortness of breath, dizziness, or fainting   Date Last Reviewed: 5/1/2016 2000-2017 [employer]. [address] [place] [address], [place] [address]. All rights reserved. This information is not intended as a substitute for professional medical care. Always follow your healthcare professional's instructions.

## 2017-10-19 DIAGNOSIS — R00.2 PALPITATIONS: Primary | ICD-10-CM

## 2017-10-23 ENCOUNTER — HOSPITAL ENCOUNTER (OUTPATIENT)
Dept: CARDIOLOGY | Facility: CLINIC | Age: 37
Discharge: HOME | End: 2017-10-23
Attending: EMERGENCY MEDICINE | Admitting: EMERGENCY MEDICINE
Payer: COMMERCIAL

## 2017-10-23 DIAGNOSIS — R00.2 PALPITATIONS: ICD-10-CM

## 2017-10-23 PROCEDURE — 0298T ZZC EXT ECG > 48HR TO 21 DAY REVIEW AND INTERPRETATN: CPT | Performed by: INTERNAL MEDICINE

## 2017-10-23 PROCEDURE — 0296T ZIO PATCH HOLTER: CPT | Performed by: EMERGENCY MEDICINE

## 2017-11-15 ENCOUNTER — ALLIED HEALTH/NURSE VISIT (OUTPATIENT)
Dept: PEDIATRICS | Facility: CLINIC | Age: 37
End: 2017-11-15
Payer: COMMERCIAL

## 2017-11-15 DIAGNOSIS — Z23 NEED FOR PROPHYLACTIC VACCINATION AND INOCULATION AGAINST INFLUENZA: Primary | ICD-10-CM

## 2017-11-15 PROCEDURE — 90686 IIV4 VACC NO PRSV 0.5 ML IM: CPT

## 2017-11-15 PROCEDURE — 90471 IMMUNIZATION ADMIN: CPT

## 2017-11-15 PROCEDURE — 99207 ZZC NO CHARGE NURSE ONLY: CPT

## 2017-11-15 NOTE — PROGRESS NOTES

## 2017-11-15 NOTE — MR AVS SNAPSHOT
After Visit Summary   11/15/2017    Era Garland    MRN: [id number]           Patient Information     Date Of Birth          1980        Visit Information        Provider Department      11/15/2017 3:00 PM [place] [facility] CMA/LPN [facility]        Today's Diagnoses     Need for prophylactic vaccination and inoculation against influenza    -  1       Follow-ups after your visit        Your next 10 appointments already scheduled     Nov 17, 2017  2:30 PM [facility]    [address] [place] [address] [id number] [phone]              Who to contact     If you have questions or need follow up information about today's clinic visit or your schedule please contact [facility] directly at [phone].  Normal or non-critical lab and imaging results will be communicated to you by "[employer]hart, letter or phone within 4 business days after the clinic has received the results. If you do not hear from us within 7 days, please contact the clinic through "[employer]hart or phone. If you have a critical or abnormal lab result, we will notify you by phone as soon as possible.  Submit refill requests through [employer] or call your pharmacy and they will forward the refill request to us. Please allow 3 business days for your refill to be completed.          Additional Information About Your Visit        MyChart Information     [employer] gives you secure access to your electronic health record. If you see a primary care provider, you can also send messages to your care team and make appointments. If you have questions, please call your primary care clinic.  If you do not have a primary care provider, please call [phone] and they will assist you.        Care EveryWhere ID     This is your Care EveryWhere ID. This could be used by other organizations to access your  [facility] medical records  [id number]         Blood Pressure from Last 3 Encounters:   08/27/17 113/70   04/13/17 105/55   09/22/16 107/62    Weight from Last 3 Encounters:   04/13/17 161 lb 3.2 oz (73.1 kg)   09/22/16 156 lb (70.8 kg)   09/09/16 157 lb (71.2 kg)              We Performed the Following     FLU VAC, SPLIT VIRUS IM > 3 YO (QUADRIVALENT) [25474]     Vaccine Administration, Initial [47292]        Primary Care Provider Office Phone # Fax #    Sabiha Manisha Chavarria [phone] [address] [facility] [address]        Equal Access to Services     ANJUM CHOW : Taniya velao Sovania, waaxda luqadaha, qaybta kaalmada adeegyaammy, brianna alvarez. So [facility] [phone].    ATENCIÓN: Si habla español, tiene a dover disposición servicios gratuitos de asistencia lingüística. Llame al [phone].    We comply with applicable federal civil rights laws and [place] laws. We do not discriminate on the basis of race, color, national origin, age, disability, sex, sexual orientation, or gender identity.            Thank you!     Thank you for choosing [facility]  for your care. Our goal is always to provide you with excellent care. Hearing back from our patients is one way we can continue to improve our services. Please take a few minutes to complete the written survey that you may receive in the mail after your visit with us. Thank you!             Your Updated Medication List - Protect others around you: Learn how to safely use, store and throw away your medicines at www.disposemymeds.org.          This list is accurate as of: 11/15/17  3:28 PM.  Always use your most recent med list.                   Brand Name Dispense Instructions for use Diagnosis    ACIDOPHILUS PO      Take 1 tablet by mouth daily.        ALBUTEROL IN      AS NEEDED        ALPRAZolam 0.5 MG tablet    XANAX    40 tablet    Take 1 tablet (0.5 mg) by mouth  3 times daily as needed for anxiety    Major depressive disorder, recurrent episode, moderate (H)       buPROPion 300 MG 24 hr tablet    WELLBUTRIN XL    90 tablet    Take 1 tablet (300 mg) by mouth every morning    Major depressive disorder, recurrent episode, moderate (H)       calcium carbonate-vitamin D 600-400 MG-UNIT Chew      Take 1 chew tab by mouth daily        citalopram 40 MG tablet    celeXA    135 tablet    Take 1.5 tablets (60 mg) by mouth daily    Major depressive disorder, recurrent episode, moderate (H)       fluticasone 50 MCG/ACT spray    FLONASE    1 Bottle    Spray 2 sprays into both nostrils daily    Chronic rhinitis       fluticasone-salmeterol 500-50 MCG/DOSE diskus inhaler    ADVAIR     Inhale 1 puff into the lungs every 12 hours.        ibuprofen 400 MG tablet    ADVIL/MOTRIN    120 tablet    Take 1-2 tablets by mouth every 6 hours as needed (cramping).     (normal spontaneous vaginal delivery)       LANsoprazole 30 MG CR capsule    PREVACID    180 capsule    Take 1 capsule (30 mg) by mouth 2 times daily    Gastroesophageal reflux disease without esophagitis       order for DME     2 Device    Equipment being ordered: seasonal affective disorder UV lamp    Seasonal affective disorder (H)       SUDAFED 12 HOUR PO      Take 1 tablet by mouth once.        VITAMIN C PO      Take 1,000 mg by mouth daily.        VITAMIN D3 PO      Take 2,000 Units by mouth daily.

## 2017-11-17 ENCOUNTER — OFFICE VISIT (OUTPATIENT)
Dept: PODIATRY | Facility: CLINIC | Age: 37
End: 2017-11-17
Payer: COMMERCIAL

## 2017-11-17 VITALS — HEART RATE: 86 BPM | OXYGEN SATURATION: 96 % | BODY MASS INDEX: 23.01 KG/M2 | WEIGHT: 165 LBS

## 2017-11-17 DIAGNOSIS — B35.1 DERMATOPHYTOSIS OF NAIL: ICD-10-CM

## 2017-11-17 DIAGNOSIS — L60.0 INGROWING NAIL: Primary | ICD-10-CM

## 2017-11-17 LAB
ALBUMIN SERPL-MCNC: 4 G/DL (ref 3.4–5)
ALP SERPL-CCNC: 62 U/L (ref 40–150)
ALT SERPL W P-5'-P-CCNC: 28 U/L (ref 0–50)
AST SERPL W P-5'-P-CCNC: 17 U/L (ref 0–45)
BILIRUB DIRECT SERPL-MCNC: <0.1 MG/DL (ref 0–0.2)
BILIRUB SERPL-MCNC: 0.2 MG/DL (ref 0.2–1.3)
PROT SERPL-MCNC: 7.3 G/DL (ref 6.8–8.8)

## 2017-11-17 PROCEDURE — 36415 COLL VENOUS BLD VENIPUNCTURE: CPT | Performed by: PODIATRIST

## 2017-11-17 PROCEDURE — 11730 AVULSION NAIL PLATE SIMPLE 1: CPT | Mod: T5 | Performed by: PODIATRIST

## 2017-11-17 PROCEDURE — 80076 HEPATIC FUNCTION PANEL: CPT | Performed by: PODIATRIST

## 2017-11-17 PROCEDURE — 99203 OFFICE O/P NEW LOW 30 MIN: CPT | Mod: 25 | Performed by: PODIATRIST

## 2017-11-17 RX ORDER — TERBINAFINE HYDROCHLORIDE 250 MG/1
250 TABLET ORAL DAILY
Qty: 30 TABLET | Refills: 2 | Status: SHIPPED | OUTPATIENT
Start: 2017-11-17 | End: 2018-05-31

## 2017-11-17 NOTE — PATIENT INSTRUCTIONS
We wish you continued good healing. If you have any questions or concerns, please do not hesitate to contact us at [phone]      Please remember to call and schedule a follow up appointment if one was recommended at your earliest convenience.   PODIATRY CLINIC HOURS  TELEPHONE NUMBER    Dr. Cesario Acosta D.P.[facility]    Clinics:  [facility]        Pinky Landa MA  Medical Assistant  Tuesday 1PM-6PM  [facility]  Wednesday 7AM-2PM  [place]  Thursday 10AM-6PM  [place] Friday 7AM-345PM  [place]  Specialty schedulers:   [phone] to make an appointment with any Specialty Provider.        Urgent Care locations:    [facility] Monday-Friday 5 pm - 9 pm. Saturday-Sunday 9 am -5pm    Monday-Friday 11 am - 9 pm Saturday 9 am - 5 pm     Monday-Sunday 12 noon-8PM [phone]     If you need a medication refill, please contact us you may need lab work and/or a follow up visit prior to your refill (i.e. Antifungal medications).    "[employer]t (secure e-mail communication and access to your chart) to send a message or to make an appointment.    If MRI needed please call Marco Antonio Nowak at [phone]        Weight management plan: Patient was referred to their PCP to discuss a diet and exercise plan.

## 2017-11-17 NOTE — NURSING NOTE
"Chief Complaint   Patient presents with     Ingrown Toenail     right big toe     Fungal Infection     nails mostly right foot       Initial Pulse 86  Wt 165 lb (74.8 kg)  SpO2 96%  BMI 23.01 kg/m2 Estimated body mass index is 23.01 kg/(m^2) as calculated from the following:    Height as of 4/13/17: 5' 11\" (1.803 m).    Weight as of this encounter: 165 lb (74.8 kg).  Medication Reconciliation: complete  "

## 2017-11-17 NOTE — MR AVS SNAPSHOT
After Visit Summary   11/17/2017    Era Garland    MRN: [id number]           Patient Information     Date Of Birth          1980        Visit Information        Provider Department      11/17/2017 2:30 PM Cesario Acosta, DPM [facility]        Today's Diagnoses     Ingrowing nail    -  1    Dermatophytosis of nail          Care Instructions    We wish you continued good healing. If you have any questions or concerns, please do not hesitate to contact us at [phone]      Please remember to call and schedule a follow up appointment if one was recommended at your earliest convenience.   PODIATRY CLINIC HOURS  TELEPHONE NUMBER    Dr. Cesario ROSEPLAKESHA [facility]    Clinics:  [facility]        Pinky Landa MA  Medical Assistant  Tuesday 1PM-6PM  [place]  Wednesday 7AM-2PM  [place]  Thursday 10AM-6PM  [place]  Friday 7AM-345PM  [place]  Specialty schedulers:   [phone] to make an appointment with any Specialty Provider.        Urgent Care locations:    [facility] Monday-Friday 5 pm - 9 pm. Saturday-Sunday 9 am -5pm    Monday-Friday 11 am - 9 pm Saturday 9 am - 5 pm     Monday-Sunday 12 noon-8PM [phone]     If you need a medication refill, please contact us you may need lab work and/or a follow up visit prior to your refill (i.e. Antifungal medications).    [facility] (secure e-mail communication and access to your chart) to send a message or to make an appointment.    If MRI needed please call [place] [facility] at [phone]        Weight management plan: Patient was referred to their PCP to discuss a diet and exercise plan.            Follow-ups after your visit        Who to contact     If you have questions or need follow up information about today's clinic visit or your schedule please contact [facility] directly at [phone].  Normal or non-critical lab and imaging results will be communicated to you by [facility], letter or phone within 4 business days after the clinic has received the results. If you do not hear from us within 7 days, please contact the clinic through [employer] or phone. If you have a critical or abnormal lab result, we will notify you by phone as soon as possible.  Submit refill requests through [employer] or call your pharmacy and they will forward the refill request to us. Please allow 3 business days for your refill to be completed.          Additional Information About Your Visit        [facility] [employer] Information     [employer] gives you secure access to your electronic health record. If you see a primary care provider, you can also send messages to your care team and make appointments. If you have questions, please call your primary care clinic.  If you do not have a primary care provider, please call [phone] and they will assist you.        Care EveryWhere ID     This is your Care EveryWhere ID. This could be used by other organizations to access your [place] medical records  [id number]        Your Vitals Were     Pulse Pulse Oximetry BMI (Body Mass Index)             86 96% 23.01 kg/m2          Blood Pressure from Last 3 Encounters:   08/27/17 113/70   04/13/17 105/55   09/22/16 107/62    Weight from Last 3 Encounters:   11/17/17 165 lb (74.8 kg)   04/13/17 161 lb 3.2 oz (73.1 kg)   09/22/16 156 lb (70.8 kg)              We Performed the Following     Hepatic panel     REMOVAL OF NAIL PLATE SIMPLE SINGLE          Today's Medication Changes          These changes are accurate as of: 11/17/17  3:51 PM.  If you have any questions, ask your nurse or doctor.               Start taking these medicines.        Dose/Directions    terbinafine 250 MG tablet   Commonly known as:  lamISIL   Used for:  Dermatophytosis of nail   Started by:  Cesario Acosta DPM        Dose:  250 mg   Take 1  tablet (250 mg) by mouth daily   Quantity:  30 tablet   Refills:  2            Where to get your medicines      These medications were sent to [place] [address] [facility] - [place], [place] - [facility] [address] [facility]  [address] [facility], [facility] [address]     Phone:  [phone]     terbinafine 250 MG tablet                Primary Care Provider Office Phone # Fax #    Sabiha Manisha Chavarria [phone] [address] [facility] [address]        Equal Access to Services     ANJUM CHOW : Hadii aad ku hadasho Soomaali, waaxda luqadaha, qaybta kaalmada adeegyada, waxay idiin haylindan ricky hawthorne . So [facility] [phone].    ATENCIÓN: Si habla español, tiene a dover disposición servicios gratuitos de asistencia lingüística. [facility] [phone].    We comply with applicable federal civil rights laws and [place] laws. We do not discriminate on the basis of race, color, national origin, age, disability, sex, sexual orientation, or gender identity.            Thank you!     Thank you for choosing [facility]  for your care. Our goal is always to provide you with excellent care. Hearing back from our patients is one way we can continue to improve our services. Please take a few minutes to complete the written survey that you may receive in the mail after your visit with us. Thank you!             Your Updated Medication List - Protect others around you: Learn how to safely use, store and throw away your medicines at www.disposemymeds.org.          This list is accurate as of: 11/17/17  3:51 PM.  Always use your most recent med list.                   Brand Name Dispense Instructions for use Diagnosis    ACIDOPHILUS PO      Take 1 tablet by mouth daily.        ALBUTEROL IN      AS NEEDED        ALPRAZolam 0.5 MG tablet    XANAX    40 tablet    Take 1 tablet (0.5 mg) by mouth 3 times daily as needed for anxiety    Major depressive disorder, recurrent episode,  moderate (H)       buPROPion 300 MG 24 hr tablet    WELLBUTRIN XL    90 tablet    Take 1 tablet (300 mg) by mouth every morning    Major depressive disorder, recurrent episode, moderate (H)       calcium carbonate-vitamin D 600-400 MG-UNIT Chew      Take 1 chew tab by mouth daily        citalopram 40 MG tablet    celeXA    135 tablet    Take 1.5 tablets (60 mg) by mouth daily    Major depressive disorder, recurrent episode, moderate (H)       fluticasone 50 MCG/ACT spray    FLONASE    1 Bottle    Spray 2 sprays into both nostrils daily    Chronic rhinitis       fluticasone-salmeterol 500-50 MCG/DOSE diskus inhaler    ADVAIR     Inhale 1 puff into the lungs every 12 hours.        ibuprofen 400 MG tablet    ADVIL/MOTRIN    120 tablet    Take 1-2 tablets by mouth every 6 hours as needed (cramping).     (normal spontaneous vaginal delivery)       LANsoprazole 30 MG CR capsule    PREVACID    180 capsule    Take 1 capsule (30 mg) by mouth 2 times daily    Gastroesophageal reflux disease without esophagitis       order for DME     2 Device    Equipment being ordered: seasonal affective disorder UV lamp    Seasonal affective disorder (H)       SUDAFED 12 HOUR PO      Take 1 tablet by mouth once.        terbinafine 250 MG tablet    lamISIL    30 tablet    Take 1 tablet (250 mg) by mouth daily    Dermatophytosis of nail       VITAMIN C PO      Take 1,000 mg by mouth daily.        VITAMIN D3 PO      Take 2,000 Units by mouth daily.

## 2017-11-17 NOTE — LETTER
11/17/2017         RE: Era Garland  [address]  [place] [address]        Dear Colleague,    Thank you for referring your patient, Era Garland, to the [facility]. Please see a copy of my visit note below.    Patient complains of thick left second toenails(s) and bilateral fifth nails .  Has had this for 1 years.  It is slowly getting worse.  Has had pain in the past which is aggravated by activity and relieved by rest.  Tried over the counter medications without success.  Does not like the look of the nail and is wondering about options.  Patient denies heavy OH use.  Denies high cholesterol or being on any statin medication.  Patient also has pain right hallux lateral border.  Aggravated by activity and relieved by rest.  Has had dystrophic nail here her whole life.         ROS:  A 10-point review of systems was performed and is positive for that noted in the HPI and as seen below.  All other areas are negative.          Allergies   Allergen Reactions     Diflucan [Fluconazole] Rash     Augmentin Diarrhea       Current Outpatient Prescriptions   Medication Sig Dispense Refill     terbinafine (LAMISIL) 250 MG tablet Take 1 tablet (250 mg) by mouth daily 30 tablet 2     calcium carbonate-vitamin D 600-400 MG-UNIT CHEW Take 1 chew tab by mouth daily       buPROPion (WELLBUTRIN XL) 300 MG 24 hr tablet Take 1 tablet (300 mg) by mouth every morning 90 tablet 3     fluticasone (FLONASE) 50 MCG/ACT nasal spray Spray 2 sprays into both nostrils daily 1 Bottle 3     citalopram (CELEXA) 40 MG tablet Take 1.5 tablets (60 mg) by mouth daily 135 tablet 3     LANsoprazole (PREVACID) 30 MG capsule Take 1 capsule (30 mg) by mouth 2 times daily 180 capsule 3     order for DME Equipment being ordered: seasonal affective disorder UV lamp 2 Device 0     ALPRAZolam (XANAX) 0.5 MG tablet Take 1 tablet (0.5 mg) by mouth 3 times daily as needed for anxiety 40 tablet 1     ibuprofen  (ADVIL,MOTRIN) 400 MG tablet Take 1-2 tablets by mouth every 6 hours as needed (cramping). 120 tablet 0     Ascorbic Acid (VITAMIN C PO) Take 1,000 mg by mouth daily.       fluticasone-salmeterol (ADVAIR) 500-50 MCG/DOSE diskus inhaler Inhale 1 puff into the lungs every 12 hours.       Cholecalciferol (VITAMIN D3 PO) Take 2,000 Units by mouth daily.       Pseudoephedrine HCl (SUDAFED 12 HOUR PO) Take 1 tablet by mouth once.       Lactobacillus (ACIDOPHILUS PO) Take 1 tablet by mouth daily.       ALBUTEROL IN AS NEEDED         Patient Active Problem List   Diagnosis     CARDIOVASCULAR SCREENING; LDL GOAL LESS THAN 160     Intermittent asthma     Lactose intolerance     Moderate major depression (H)     Axillary mass     IUD (intrauterine device) in place       Past Medical History:   Diagnosis Date     Anxiety      Chickenpox      Depression      GERD (gastroesophageal reflux disease)      HPV (human papilloma virus) infection      IBS (irritable bowel syndrome)      Intermittent asthma      S/P LEEP of cervix age 24    path results?     Urinary tract infections        Past Surgical History:   Procedure Laterality Date     LAPAROSCOPIC APPENDECTOMY  6/20/2012    Procedure: LAPAROSCOPIC APPENDECTOMY;  Laparoscopic Appendectomy;  Surgeon: Jorge Luis Tan MD;  Location: [facility]     LEEP TX, CERVICAL  age 24     MOUTH SURGERY      wisdom teeth       Family History   Problem Relation Age of Onset     Depression Mother      GASTROINTESTINAL DISEASE Mother      Allergies Father      Eye Disorder Father      Hypertension Father      OSTEOPOROSIS Paternal Grandmother      Hypertension Paternal Grandmother      Alzheimer Disease Paternal Grandfather      Asthma Brother      Depression Brother      Cardiovascular Maternal Grandfather      MI     Alcohol/Drug Maternal Grandmother        Social History   Substance Use Topics     Smoking status: Never Smoker     Smokeless tobacco: Never Used     Alcohol use Yes      Comment:  3 drinks weekly- quit with pregnancy         Pulse 86  Wt 165 lb (74.8 kg)  SpO2 96%  BMI 23.01 kg/m2.      VConstitutional/ general:  Pt is in no apparent distress, appears well-nourished.  Cooperative with history and physical exam.     Psych:  The patient answered questions appropriately.  Normal affect.  Seems to have reasonable expectations, in terms of treatment.    Eyes:  Visual scanning/ tracking without deficit.    Ears:  Response to auditory stimuli is normal.  negative hearing aid devices.  Auricles in proper alignment.     Lymphatic:  Popliteal lymph nodes not enlarged.     Lungs:  Non labored breathing, non labored speech. No cough.  No audible wheezing. Even, quiet breathing.       Vascular:  Pedal pulses are palpable bilaterally for both the DP and PT arteries.  CFT < 3 sec.  No edema.  Pedal hair growth noted.     Neuro:  Alert and oriented x 3. Coordinated gait.  Light touch sensation is intact to the L4, L5, S1 distributions. No obvious deficits.  No evidence of neurological-based weakness, spasticity, or contracture in the lower extremities.    Derm: Normal texture and turgor.  No erythema, ecchymosis, or cyanosis.  No open lesions. left second and both fifth toenails(s)  thickened, elongated, discolored, with subungual debris.  No erythema, edema, drainage, pain on palpation.  No signs of subungual masses or exostosis.  Right hallux nail short, dystrophic.  Pain lateral border    Musculoskeletal:    Lower extremity muscle strength is normal.  Patient is ambulatory without an assistive device or brace.  No gross deformities.  MS 5/5 all compartments.  Normal arch with weightbearing.         A/P  Onychomycosis          Ingrown nail    Discussed all options with patient.  Would like to try lamisil.  Risks, complications, and efficacy of going on this pill were discussed with the patient.  Will start lamisil 250mg, dispense 30, one per oral every day.  Two refils.  Will get LFTs today for  baseline.  RTC in 3 months.     Discussed etiology and treatment options with the pt.  Risks and benefits of partial temporary nail removal were discussed in detail.  Obtained consent and blocked hallux using 3 cc of 1% Lidocaine plain into the right hallux.  Sterile prep and avulsed the lateral border and placed dry sterile dressing.  Cappillary refill time wnl.  Pt tolerated procedure well.  Wound care instructions given and RTC PRN.      Cesario Acosta DPM, FACFAS                  Again, thank you for allowing me to participate in the care of your patient.        Sincerely,        Cesario Acosta DPM

## 2017-11-17 NOTE — PROGRESS NOTES
Patient complains of thick left second toenails(s) and bilateral fifth nails .  Has had this for 1 years.  It is slowly getting worse.  Has had pain in the past which is aggravated by activity and relieved by rest.  Tried over the counter medications without success.  Does not like the look of the nail and is wondering about options.  Patient denies heavy OH use.  Denies high cholesterol or being on any statin medication.  Patient also has pain right hallux lateral border.  Aggravated by activity and relieved by rest.  Has had dystrophic nail here her whole life.         ROS:  A 10-point review of systems was performed and is positive for that noted in the HPI and as seen below.  All other areas are negative.          Allergies   Allergen Reactions     Diflucan [Fluconazole] Rash     Augmentin Diarrhea       Current Outpatient Prescriptions   Medication Sig Dispense Refill     terbinafine (LAMISIL) 250 MG tablet Take 1 tablet (250 mg) by mouth daily 30 tablet 2     calcium carbonate-vitamin D 600-400 MG-UNIT CHEW Take 1 chew tab by mouth daily       buPROPion (WELLBUTRIN XL) 300 MG 24 hr tablet Take 1 tablet (300 mg) by mouth every morning 90 tablet 3     fluticasone (FLONASE) 50 MCG/ACT nasal spray Spray 2 sprays into both nostrils daily 1 Bottle 3     citalopram (CELEXA) 40 MG tablet Take 1.5 tablets (60 mg) by mouth daily 135 tablet 3     LANsoprazole (PREVACID) 30 MG capsule Take 1 capsule (30 mg) by mouth 2 times daily 180 capsule 3     order for DME Equipment being ordered: seasonal affective disorder UV lamp 2 Device 0     ALPRAZolam (XANAX) 0.5 MG tablet Take 1 tablet (0.5 mg) by mouth 3 times daily as needed for anxiety 40 tablet 1     ibuprofen (ADVIL,MOTRIN) 400 MG tablet Take 1-2 tablets by mouth every 6 hours as needed (cramping). 120 tablet 0     Ascorbic Acid (VITAMIN C PO) Take 1,000 mg by mouth daily.       fluticasone-salmeterol (ADVAIR) 500-50 MCG/DOSE diskus inhaler Inhale 1 puff into the lungs  every 12 hours.       Cholecalciferol (VITAMIN D3 PO) Take 2,000 Units by mouth daily.       Pseudoephedrine HCl (SUDAFED 12 HOUR PO) Take 1 tablet by mouth once.       Lactobacillus (ACIDOPHILUS PO) Take 1 tablet by mouth daily.       ALBUTEROL IN AS NEEDED         Patient Active Problem List   Diagnosis     CARDIOVASCULAR SCREENING; LDL GOAL LESS THAN 160     Intermittent asthma     Lactose intolerance     Moderate major depression (H)     Axillary mass     IUD (intrauterine device) in place       Past Medical History:   Diagnosis Date     Anxiety      Chickenpox      Depression      GERD (gastroesophageal reflux disease)      HPV (human papilloma virus) infection      IBS (irritable bowel syndrome)      Intermittent asthma      S/P LEEP of cervix age 24    path results?     Urinary tract infections        Past Surgical History:   Procedure Laterality Date     LAPAROSCOPIC APPENDECTOMY  6/20/2012    Procedure: LAPAROSCOPIC APPENDECTOMY;  Laparoscopic Appendectomy;  Surgeon: Jorge Luis Tan MD;  Location: [facility]     LEEP TX, CERVICAL  age 24     MOUTH SURGERY      wisdom teeth       Family History   Problem Relation Age of Onset     Depression Mother      GASTROINTESTINAL DISEASE Mother      Allergies Father      Eye Disorder Father      Hypertension Father      OSTEOPOROSIS Paternal Grandmother      Hypertension Paternal Grandmother      Alzheimer Disease Paternal Grandfather      Asthma Brother      Depression Brother      Cardiovascular Maternal Grandfather      MI     Alcohol/Drug Maternal Grandmother        Social History   Substance Use Topics     Smoking status: Never Smoker     Smokeless tobacco: Never Used     Alcohol use Yes      Comment: 3 drinks weekly- quit with pregnancy         Pulse 86  Wt 165 lb (74.8 kg)  SpO2 96%  BMI 23.01 kg/m2.      VConstitutional/ general:  Pt is in no apparent distress, appears well-nourished.  Cooperative with history and physical exam.     Psych:  The patient  answered questions appropriately.  Normal affect.  Seems to have reasonable expectations, in terms of treatment.    Eyes:  Visual scanning/ tracking without deficit.    Ears:  Response to auditory stimuli is normal.  negative hearing aid devices.  Auricles in proper alignment.     Lymphatic:  Popliteal lymph nodes not enlarged.     Lungs:  Non labored breathing, non labored speech. No cough.  No audible wheezing. Even, quiet breathing.       Vascular:  Pedal pulses are palpable bilaterally for both the DP and PT arteries.  CFT < 3 sec.  No edema.  Pedal hair growth noted.     Neuro:  Alert and oriented x 3. Coordinated gait.  Light touch sensation is intact to the L4, L5, S1 distributions. No obvious deficits.  No evidence of neurological-based weakness, spasticity, or contracture in the lower extremities.    Derm: Normal texture and turgor.  No erythema, ecchymosis, or cyanosis.  No open lesions. left second and both fifth toenails(s)  thickened, elongated, discolored, with subungual debris.  No erythema, edema, drainage, pain on palpation.  No signs of subungual masses or exostosis.  Right hallux nail short, dystrophic.  Pain lateral border    Musculoskeletal:    Lower extremity muscle strength is normal.  Patient is ambulatory without an assistive device or brace.  No gross deformities.  MS 5/5 all compartments.  Normal arch with weightbearing.         A/P  Onychomycosis          Ingrown nail    Discussed all options with patient.  Would like to try lamisil.  Risks, complications, and efficacy of going on this pill were discussed with the patient.  Will start lamisil 250mg, dispense 30, one per oral every day.  Two refils.  Will get LFTs today for baseline.  RTC in 3 months.     Discussed etiology and treatment options with the pt.  Risks and benefits of partial temporary nail removal were discussed in detail.  Obtained consent and blocked hallux using 3 cc of 1% Lidocaine plain into the right hallux.  Sterile  prep and avulsed the lateral border and placed dry sterile dressing.  Cappillary refill time wnl.  Pt tolerated procedure well.  Wound care instructions given and RTC PRN.      Cesario Acosta DPM, FACFAS

## 2017-11-27 DIAGNOSIS — F33.1 MAJOR DEPRESSIVE DISORDER, RECURRENT EPISODE, MODERATE (H): ICD-10-CM

## 2017-11-27 DIAGNOSIS — K21.9 GASTROESOPHAGEAL REFLUX DISEASE WITHOUT ESOPHAGITIS: ICD-10-CM

## 2017-11-27 RX ORDER — CITALOPRAM HYDROBROMIDE 40 MG/1
60 TABLET ORAL DAILY
Qty: 135 TABLET | Refills: 3 | Status: SHIPPED | OUTPATIENT
Start: 2017-11-27 | End: 2018-05-03

## 2017-11-27 RX ORDER — LANSOPRAZOLE 30 MG/1
30 CAPSULE, DELAYED RELEASE ORAL 2 TIMES DAILY
Qty: 180 CAPSULE | Refills: 3 | Status: SHIPPED | OUTPATIENT
Start: 2017-11-27 | End: 2018-05-03

## 2017-11-27 RX ORDER — BUPROPION HYDROCHLORIDE 300 MG/1
300 TABLET ORAL EVERY MORNING
Qty: 90 TABLET | Refills: 3 | Status: SHIPPED | OUTPATIENT
Start: 2017-11-27 | End: 2018-05-03

## 2017-11-27 NOTE — TELEPHONE ENCOUNTER
9/9/16 last office visit    Routing refill request to provider for review/approval because:  Patient needs to be seen because it has been more than 1 year since last office visit.    Patricia Matos   [facility] Clinic  RN

## 2018-01-04 DIAGNOSIS — J30.2 CHRONIC SEASONAL ALLERGIC RHINITIS, UNSPECIFIED TRIGGER: Primary | ICD-10-CM

## 2018-01-04 NOTE — TELEPHONE ENCOUNTER
Request for Flonase received from [facility]. Patient does not appear to have PCP outside of OB/GYN. Last office visit with Dr. Chavarria noted to be 9/9/16. Will route requested refill prescription to Dr. Chavarria for advisement.     Mandy PINZON   Specialty Clinic Flex RN

## 2018-01-09 RX ORDER — FLUTICASONE PROPIONATE 50 MCG
2 SPRAY, SUSPENSION (ML) NASAL DAILY
Qty: 1 BOTTLE | Refills: 3 | Status: SHIPPED | OUTPATIENT
Start: 2018-01-09 | End: 2020-03-19

## 2018-02-06 ENCOUNTER — RESULT FOLLOW UP (OUTPATIENT)
Dept: OBGYN | Facility: CLINIC | Age: 38
End: 2018-02-06

## 2018-02-06 DIAGNOSIS — R87.612 PAPANICOLAOU SMEAR OF CERVIX WITH LOW GRADE SQUAMOUS INTRAEPITHELIAL LESION (LGSIL): ICD-10-CM

## 2018-02-06 NOTE — LETTER
May 3, 2019      Era TRAYLOR Dada  [address]  [place] [address]    Dear MsGregorio,      At [facility], your health and wellness is our primary concern. That is why we are following up on a LEEP from 5/17/18, which was reported as mostly RAYMON 1 with focal high grade and neg margins. Your provider had recommended that you have a Pap smear and HPV test completed by 5/17/19. Our records do not show that this has been scheduled.    It is important to complete the follow up that your provider has suggested for you to ensure that there are no worsening changes which may, over time, develop into cancer.      Please contact our office at  [phone] to schedule an appointment for a Pap smear and HPV test at your earliest convenience. If you have questions or concerns, please call the clinic and we will be happy to assist you.    If you have completed the tests outside of [facility], please have the results forwarded to our office. We will update the chart for your primary Physician to review before your next annual physical.     Thank you for choosing [facility]!    Sincerely,      Your [facility] Care Team/audi

## 2018-02-16 ENCOUNTER — TELEPHONE (OUTPATIENT)
Dept: OBGYN | Facility: CLINIC | Age: 38
End: 2018-02-16

## 2018-02-16 ENCOUNTER — OFFICE VISIT (OUTPATIENT)
Dept: OBGYN | Facility: CLINIC | Age: 38
End: 2018-02-16
Payer: COMMERCIAL

## 2018-02-16 VITALS
HEIGHT: 71 IN | SYSTOLIC BLOOD PRESSURE: 106 MMHG | RESPIRATION RATE: 18 BRPM | BODY MASS INDEX: 23.8 KG/M2 | WEIGHT: 170 LBS | HEART RATE: 81 BPM | DIASTOLIC BLOOD PRESSURE: 62 MMHG | TEMPERATURE: 97 F

## 2018-02-16 DIAGNOSIS — Z12.4 SCREENING FOR MALIGNANT NEOPLASM OF CERVIX: ICD-10-CM

## 2018-02-16 DIAGNOSIS — F33.1 MAJOR DEPRESSIVE DISORDER, RECURRENT EPISODE, MODERATE (H): ICD-10-CM

## 2018-02-16 DIAGNOSIS — B35.1 DERMATOPHYTOSIS OF NAIL: ICD-10-CM

## 2018-02-16 DIAGNOSIS — Z01.419 ENCOUNTER FOR GYNECOLOGICAL EXAMINATION WITHOUT ABNORMAL FINDING: Primary | ICD-10-CM

## 2018-02-16 DIAGNOSIS — Z23 NEED FOR TDAP VACCINATION: ICD-10-CM

## 2018-02-16 DIAGNOSIS — L70.0 ACNE VULGARIS: ICD-10-CM

## 2018-02-16 PROCEDURE — G0145 SCR C/V CYTO,THINLAYER,RESCR: HCPCS | Performed by: OBSTETRICS & GYNECOLOGY

## 2018-02-16 PROCEDURE — 99395 PREV VISIT EST AGE 18-39: CPT | Mod: 25 | Performed by: OBSTETRICS & GYNECOLOGY

## 2018-02-16 PROCEDURE — G0124 SCREEN C/V THIN LAYER BY MD: HCPCS | Performed by: OBSTETRICS & GYNECOLOGY

## 2018-02-16 PROCEDURE — 90715 TDAP VACCINE 7 YRS/> IM: CPT | Performed by: OBSTETRICS & GYNECOLOGY

## 2018-02-16 PROCEDURE — 90471 IMMUNIZATION ADMIN: CPT | Performed by: OBSTETRICS & GYNECOLOGY

## 2018-02-16 PROCEDURE — 87624 HPV HI-RISK TYP POOLED RSLT: CPT | Performed by: OBSTETRICS & GYNECOLOGY

## 2018-02-16 RX ORDER — SPIRONOLACTONE 100 MG/1
100 TABLET, FILM COATED ORAL DAILY
Qty: 90 TABLET | Refills: 3 | Status: SHIPPED | OUTPATIENT
Start: 2018-02-16 | End: 2018-05-03

## 2018-02-16 RX ORDER — TERBINAFINE HYDROCHLORIDE 250 MG/1
250 TABLET ORAL DAILY
Qty: 90 TABLET | Refills: 0 | Status: SHIPPED | OUTPATIENT
Start: 2018-02-16 | End: 2018-12-21

## 2018-02-16 RX ORDER — ALPRAZOLAM 0.5 MG
0.5 TABLET ORAL 3 TIMES DAILY PRN
Qty: 40 TABLET | Refills: 1 | Status: SHIPPED | OUTPATIENT
Start: 2018-02-16 | End: 2021-01-18

## 2018-02-16 NOTE — TELEPHONE ENCOUNTER
[facility] request back from [employer] on the Lamisil 250 mg tablet.  They denied request.  They only will veor this for approved criteria which pt does not meet.    Pt may pay for this out of pocket or provider may change rx to something else (no suggestions given).    Appeal may be sent to Fax # [phone].    Please advise.    Thanks-    -Melba Mera  [facility] [place]

## 2018-02-16 NOTE — MR AVS SNAPSHOT
After Visit Summary   2/16/2018    Era Garland    MRN: [id number]           Patient Information     Date Of Birth          1980        Visit Information        Provider Department      2/16/2018 9:00 AM Sabiha Chavarria MD [facility]        Today's Diagnoses     Encounter for gynecological examination without abnormal finding    -  1    Major depressive disorder, recurrent episode, moderate (H)        Screening for malignant neoplasm of cervix        Need for Tdap vaccination        Dermatophytosis of nail        Acne vulgaris          Care Instructions      Preventive Health Recommendations  Female Ages 26 - 39  Yearly exam:   See your health care provider every year in order to    Review health changes.     Discuss preventive care.      Review your medicines if you your doctor has prescribed any.    Until age 30: Get a Pap test every three years (more often if you have had an abnormal result).    After age 30: Talk to your doctor about whether you should have a Pap test every 3 years or have a Pap test with HPV screening every 5 years.   You do not need a Pap test if your uterus was removed (hysterectomy) and you have not had cancer.  You should be tested each year for STDs (sexually transmitted diseases), if you're at risk.   Talk to your provider about how often to have your cholesterol checked.  If you are at risk for diabetes, you should have a diabetes test (fasting glucose).  Shots: Get a flu shot each year. Get a tetanus shot every 10 years.   Nutrition:     Eat at least 5 servings of fruits and vegetables each day.    Eat whole-grain bread, whole-wheat pasta and brown rice instead of white grains and rice.    Talk to your provider about Calcium and Vitamin D.     Lifestyle    Exercise at least 150 minutes a week (30 minutes a day, 5 days of the week). This will help you control your weight and prevent disease.    Limit alcohol to one drink per day.    No smoking.  "    Wear sunscreen to prevent skin cancer.    See your dentist every six months for an exam and cleaning.            Follow-ups after your visit        Who to contact     If you have questions or need follow up information about today's clinic visit or your schedule please contact [facility] directly at [phone].  Normal or non-critical lab and imaging results will be communicated to you by MyChart, letter or phone within 4 business days after the clinic has received the results. If you do not hear from us within 7 days, please contact the clinic through BiologicsInchart or phone. If you have a critical or abnormal lab result, we will notify you by phone as soon as possible.  Submit refill requests through M.dot or call your pharmacy and they will forward the refill request to us. Please allow 3 business days for your refill to be completed.          Additional Information About Your Visit        MyChart Information     M.dot gives you secure access to your electronic health record. If you see a primary care provider, you can also send messages to your care team and make appointments. If you have questions, please call your primary care clinic.  If you do not have a primary care provider, please call [phone] and they will assist you.        Care EveryWhere ID     This is your Care EveryWhere ID. This could be used by other organizations to access your [facility] medical records  [id number]        Your Vitals Were     Pulse Temperature Respirations Height BMI (Body Mass Index)       81 97  F (36.1  C) (Tympanic) 18 5' 11\" (1.803 m) 23.71 kg/m2        Blood Pressure from Last 3 Encounters:   02/16/18 106/62   08/27/17 113/70   04/13/17 105/55    Weight from Last 3 Encounters:   02/16/18 170 lb (77.1 kg)   11/17/17 165 lb (74.8 kg)   04/13/17 161 lb 3.2 oz (73.1 kg)              We Performed the Following     ADMIN 1st VACCINE     HPV High Risk Types DNA Cervical     Pap imaged thin layer screen with " HPV - recommended age 30 - 65 years (select HPV order below)     TDAP VACCINE (ADACEL)          Today's Medication Changes          These changes are accurate as of 2/16/18  9:51 AM.  If you have any questions, ask your nurse or doctor.               Start taking these medicines.        Dose/Directions    spironolactone 100 MG tablet   Commonly known as:  ALDACTONE   Used for:  Acne vulgaris   Started by:  Sabiha Chavarria MD        Dose:  100 mg   Take 1 tablet (100 mg) by mouth daily   Quantity:  90 tablet   Refills:  3         These medicines have changed or have updated prescriptions.        Dose/Directions    * terbinafine 250 MG tablet   Commonly known as:  lamISIL   This may have changed:  Another medication with the same name was added. Make sure you understand how and when to take each.   Used for:  Dermatophytosis of nail   Changed by:  Sabiha Chavarria MD        Dose:  250 mg   Take 1 tablet (250 mg) by mouth daily   Quantity:  30 tablet   Refills:  2       * terbinafine 250 MG tablet   Commonly known as:  lamISIL   This may have changed:  You were already taking a medication with the same name, and this prescription was added. Make sure you understand how and when to take each.   Used for:  Dermatophytosis of nail   Changed by:  Sabiha Chavarria MD        Dose:  250 mg   Take 1 tablet (250 mg) by mouth daily   Quantity:  90 tablet   Refills:  0       * Notice:  This list has 2 medication(s) that are the same as other medications prescribed for you. Read the directions carefully, and ask your doctor or other care provider to review them with you.         Where to get your medicines      These medications were sent to [id number] - [facility] [address] [place] [address]  [address] [facility] [address]     Phone:  [phone]     spironolactone 100 MG tablet         Some of these will need a paper prescription and others can be bought over the counter.  Ask your  nurse if you have questions.     Bring a paper prescription for each of these medications     ALPRAZolam 0.5 MG tablet         Call your pharmacy to confirm that your medication is ready for pickup. It may take up to 24 hours for them to receive the prescription. If the prescription is not ready within 3 business days, please contact your clinic or your provider.     We will let you know when these medications are ready. If you don't hear back within 3 business days, please contact us.     terbinafine 250 MG tablet                Primary Care Provider Office Phone # Fax #    Sabiha Manisha Chavarria [phone] [address] [facility] [address]        Equal Access to Services     ANJUM CHOW : Hadii cortez iKng, wanatan anderson, qaignacia celismaammy lopez, brianna hawthorne . So [facility] [phone].    ATENCIÓN: Si habla español, tiene a dover disposición servicios gratuitos de asistencia lingüística. Llame al [phone].    We comply with applicable federal civil rights laws and [place] laws. We do not discriminate on the basis of race, color, national origin, age, disability, sex, sexual orientation, or gender identity.            Thank you!     Thank you for choosing [facility]  for your care. Our goal is always to provide you with excellent care. Hearing back from our patients is one way we can continue to improve our services. Please take a few minutes to complete the written survey that you may receive in the mail after your visit with us. Thank you!             Your Updated Medication List - Protect others around you: Learn how to safely use, store and throw away your medicines at www.disposemymeds.org.          This list is accurate as of 2/16/18  9:51 AM.  Always use your most recent med list.                   Brand Name Dispense Instructions for use Diagnosis    ACIDOPHILUS PO      Take 1 tablet by mouth daily.         ALBUTEROL IN      AS NEEDED        ALPRAZolam 0.5 MG tablet    XANAX    40 tablet    Take 1 tablet (0.5 mg) by mouth 3 times daily as needed for anxiety    Major depressive disorder, recurrent episode, moderate (H)       buPROPion 300 MG 24 hr tablet    WELLBUTRIN XL    90 tablet    Take 1 tablet (300 mg) by mouth every morning    Major depressive disorder, recurrent episode, moderate (H)       calcium carbonate-vitamin D 600-400 MG-UNIT Chew      Take 1 chew tab by mouth daily        citalopram 40 MG tablet    celeXA    135 tablet    Take 1.5 tablets (60 mg) by mouth daily    Major depressive disorder, recurrent episode, moderate (H)       fluticasone 50 MCG/ACT spray    FLONASE    1 Bottle    Spray 2 sprays into both nostrils daily    Chronic seasonal allergic rhinitis, unspecified trigger       fluticasone-salmeterol 500-50 MCG/DOSE diskus inhaler    ADVAIR     Inhale 1 puff into the lungs every 12 hours.        ibuprofen 400 MG tablet    ADVIL/MOTRIN    120 tablet    Take 1-2 tablets by mouth every 6 hours as needed (cramping).     (normal spontaneous vaginal delivery)       LANsoprazole 30 MG CR capsule    PREVACID    180 capsule    Take 1 capsule (30 mg) by mouth 2 times daily    Gastroesophageal reflux disease without esophagitis       order for DME     2 Device    Equipment being ordered: seasonal affective disorder UV lamp    Seasonal affective disorder (H)       spironolactone 100 MG tablet    ALDACTONE    90 tablet    Take 1 tablet (100 mg) by mouth daily    Acne vulgaris       SUDAFED 12 HOUR PO      Take 1 tablet by mouth once.        * terbinafine 250 MG tablet    lamISIL    30 tablet    Take 1 tablet (250 mg) by mouth daily    Dermatophytosis of nail       * terbinafine 250 MG tablet    lamISIL    90 tablet    Take 1 tablet (250 mg) by mouth daily    Dermatophytosis of nail       VITAMIN C PO      Take 1,000 mg by mouth daily.        VITAMIN D3 PO      Take 2,000 Units by mouth daily.        *  Notice:  This list has 2 medication(s) that are the same as other medications prescribed for you. Read the directions carefully, and ask your doctor or other care provider to review them with you.

## 2018-02-16 NOTE — TELEPHONE ENCOUNTER
Central Prior Authorization Team   Phone: [phone]      PRIOR AUTHORIZATION DENIED    Medication: terbinafine (LAMISIL) 250 MG tablet - Denied    Denial Date:  02/16/2018    Denial Rational: Denied due to diagnosis    Appeal Information:   [employer] [phone]

## 2018-02-16 NOTE — NURSING NOTE
Screening Questionnaire for Adult Immunization    Are you sick today?   No   Do you have allergies to medications, food, a vaccine component or latex?   No   Have you ever had a serious reaction after receiving a vaccination?   No   Do you have a long-term health problem with heart disease, lung disease, asthma, kidney disease, metabolic disease (e.g. diabetes), anemia, or other blood disorder?   No   Do you have cancer, leukemia, HIV/AIDS, or any other immune system problem?   No   In the past 3 months, have you taken medications that affect  your immune system, such as prednisone, other steroids, or anticancer drugs; drugs for the treatment of rheumatoid arthritis, Crohn s disease, or psoriasis; or have you had radiation treatments?   No   Have you had a seizure, or a brain or other nervous system problem?   No   During the past year, have you received a transfusion of blood or blood     products, or been given immune (gamma) globulin or antiviral drug?   No   For women: Are you pregnant or is there a chance you could become        pregnant during the next month?   No   Have you received any vaccinations in the past 4 weeks?   No     Immunization questionnaire answers were all negative.        Per orders of Dr. Chavarria injection of adacel given by Evonne Hernandez. Patient instructed to remain in clinic for 15 minutes afterwards, and to report any adverse reaction to me immediately.  Prior to injection verified patient identity using patient's name and date of birth.     Screening performed by Evonne Hernandez on 2/16/2018 at 9:39 AM.

## 2018-02-16 NOTE — TELEPHONE ENCOUNTER
Deon, Sabiha Dyer MD  [facility]        Caller: Unspecified (Today, 10:58 AM)                     Notify pt that insurance denied the antifungal medication   Sabiha Chavarria MD   [facility]            Previous Messages          Pt informed - she will just pay out of pocket for it.    -Melba SUAZO [facility]  Clinic Station [place]

## 2018-02-16 NOTE — PROGRESS NOTES
SUBJECTIVE:   CC: Era Garland is an 37 year old woman who presents for preventive health visit.   She reports she is in the midst of a divorce (her choice) which is exacerbating her stress level    Healthy Habits:    Do you get at least three servings of calcium containing foods daily (dairy, green leafy vegetables, etc.)? yes    Amount of exercise or daily activities, outside of work: 2-3 day(s) per week    Problems taking medications regularly No    Medication side effects: Yes     Have you had an eye exam in the past two years? yes    Do you see a dentist twice per year? yes    Do you have sleep apnea, excessive snoring or daytime drowsiness?yes          Today's PHQ-2 Score:   PHQ-2 ( 1999 Pf[employer]) 2/16/2018 9/9/2016   Q1: Little interest or pleasure in doing things 1 1   Q2: Feeling down, depressed or hopeless 1 1   PHQ-2 Score 2 2       Abuse: Current or Past(Physical, Sexual or Emotional)- No  Do you feel safe in your environment - Yes    Social History   Substance Use Topics     Smoking status: Never Smoker     Smokeless tobacco: Never Used     Alcohol use Yes      Comment: 3 drinks weekly- quit with pregnancy     If you drink alcohol do you typically have >3 drinks per day or >7 drinks per week? No                     Reviewed orders with patient.  Reviewed health maintenance and updated orders accordingly - Yes  Labs reviewed in EPIC  BP Readings from Last 3 Encounters:   02/16/18 106/62   08/27/17 113/70   04/13/17 105/55    Wt Readings from Last 3 Encounters:   02/16/18 170 lb (77.1 kg)   11/17/17 165 lb (74.8 kg)   04/13/17 161 lb 3.2 oz (73.1 kg)                  Patient Active Problem List   Diagnosis     CARDIOVASCULAR SCREENING; LDL GOAL LESS THAN 160     Intermittent asthma     Lactose intolerance     Moderate major depression (H)     IUD (intrauterine device) in place     Past Surgical History:   Procedure Laterality Date     LAPAROSCOPIC APPENDECTOMY  6/20/2012    Procedure: LAPAROSCOPIC  APPENDECTOMY;  Laparoscopic Appendectomy;  Surgeon: Jorge Luis Tan MD;  Location: [facility] [place], CERVICAL  age 24     MOUTH SURGERY      wisdom teeth       Social History   Substance Use Topics     Smoking status: Never Smoker     Smokeless tobacco: Never Used     Alcohol use Yes      Comment: 3 drinks weekly- quit with pregnancy     Family History   Problem Relation Age of Onset     Depression Mother      GASTROINTESTINAL DISEASE Mother      Allergies Father      Eye Disorder Father      Hypertension Father      OSTEOPOROSIS Paternal Grandmother      Hypertension Paternal Grandmother      Alzheimer Disease Paternal Grandfather      Asthma Brother      Depression Brother      Cardiovascular Maternal Grandfather      MI     Alcohol/Drug Maternal Grandmother            Mammogram not appropriate for this patient based on age.    Pertinent mammograms are reviewed under the imaging tab.  History of abnormal Pap smear: NO - age 30- 65 PAP every 3 years recommended    Reviewed and updated as needed this visit by clinical staff  Tobacco  Allergies  Meds         Reviewed and updated as needed this visit by Provider            ROS:  C: NEGATIVE for fever, chills, change in weight  I: NEGATIVE for worrisome rashes, moles or lesions  E: NEGATIVE for vision changes or irritation  ENT: NEGATIVE for ear, mouth and throat problems  R: NEGATIVE for significant cough or SOB  B: NEGATIVE for masses, tenderness or discharge  CV: NEGATIVE for chest pain, palpitations or peripheral edema  GI: NEGATIVE for nausea, abdominal pain, heartburn, or change in bowel habits  : NEGATIVE for unusual urinary or vaginal symptoms. Periods are regular.  M: NEGATIVE for significant arthralgias or myalgia  N: NEGATIVE for weakness, dizziness or paresthesias  PSYCHIATRIC: positive for stress/anxiety    OBJECTIVE:   /62 (BP Location: Right arm, Patient Position: Chair, Cuff Size: Adult Small)  Pulse 81  Temp 97  F (36.1  C)  "(Tympanic)  Resp 18  Ht 5' 11\" (1.803 m)  Wt 170 lb (77.1 kg)  BMI 23.71 kg/m2  EXAM:  GENERAL: healthy, alert and no distress  EYES: Eyes grossly normal to inspection, PERRL and conjunctivae and sclerae normal  HENT: ear canals and TM's normal, nose and mouth without ulcers or lesions  NECK: no adenopathy, no asymmetry, masses, or scars and thyroid normal to palpation  RESP: lungs clear to auscultation - no rales, rhonchi or wheezes  BREAST: normal without masses, tenderness or nipple discharge and no palpable axillary masses or adenopathy  CV: regular rate and rhythm, normal S1 S2, no S3 or S4, no murmur, click or rub, no peripheral edema and peripheral pulses strong  ABDOMEN: soft, nontender, no hepatosplenomegaly, no masses and bowel sounds normal   (female): normal female external genitalia, normal urethral meatus, vaginal mucosa pink, moist, well rugated, and normal cervix/adnexa/uterus without masses or discharge; IUD strings visible; Pap done  MS: no gross musculoskeletal defects noted, no edema  SKIN: no suspicious lesions or rashes; toe fungus; facial acne vulgaris  NEURO: Normal strength and tone, mentation intact and speech normal  PSYCH: mentation appears normal, affect normal/bright    ASSESSMENT/PLAN:       ICD-10-CM    1. Encounter for gynecological examination without abnormal finding Z01.419    2. Major depressive disorder, recurrent episode, moderate (H) F33.1 ALPRAZolam (XANAX) 0.5 MG tablet   3. Screening for malignant neoplasm of cervix Z12.4 Pap imaged thin layer screen with HPV - recommended age 30 - 65 years (select HPV order below)     HPV High Risk Types DNA Cervical   4. Need for Tdap vaccination Z23 TDAP VACCINE (ADACEL)     ADMIN 1st VACCINE   5. Dermatophytosis of nail B35.1 terbinafine (LAMISIL) 250 MG tablet   6. Acne vulgaris L70.0 spironolactone (ALDACTONE) 100 MG tablet       COUNSELING:   Reviewed preventive health counseling, as reflected in patient instructions  Special " "attention given to:        self care in time of stress; Rx for Xanax prn; trial of Aldactone 100mg QD for acne; rx refilled for Lamisil for toe fungus       Regular exercise       Healthy diet/nutrition       Vision screening         reports that she has never smoked. She has never used smokeless tobacco.    Estimated body mass index is 23.71 kg/(m^2) as calculated from the following:    Height as of this encounter: 5' 11\" (1.803 m).    Weight as of this encounter: 170 lb (77.1 kg).       Counseling Resources:  ATP IV Guidelines  Pooled Cohorts Equation Calculator  Breast Cancer Risk Calculator  FRAX Risk Assessment  ICSI Preventive Guidelines  Dietary Guidelines for Americans, 2010  USDA's MyPlate  ASA Prophylaxis  Lung CA Screening    Sabiha Chavarria MD  [facility]  "

## 2018-02-17 ASSESSMENT — PATIENT HEALTH QUESTIONNAIRE - PHQ9: SUM OF ALL RESPONSES TO PHQ QUESTIONS 1-9: 10

## 2018-02-23 LAB
COPATH REPORT: ABNORMAL
PAP: ABNORMAL

## 2018-02-26 LAB
FINAL DIAGNOSIS: ABNORMAL
HPV HR 12 DNA CVX QL NAA+PROBE: POSITIVE
HPV16 DNA SPEC QL NAA+PROBE: NEGATIVE
HPV18 DNA SPEC QL NAA+PROBE: NEGATIVE
SPECIMEN DESCRIPTION: ABNORMAL
SPECIMEN SOURCE CVX/VAG CYTO: ABNORMAL

## 2018-02-27 PROBLEM — R87.612 PAPANICOLAOU SMEAR OF CERVIX WITH LOW GRADE SQUAMOUS INTRAEPITHELIAL LESION (LGSIL): Status: ACTIVE | Noted: 2018-02-27

## 2018-02-27 NOTE — PROGRESS NOTES
2/16/18 Pap: LSIL, +HR HPV (neg 16/18). Plan [place] due by 5/16/18 2/27/18 Message left to return call.   2/27/18 Pt notified by phone. Pt will call the [facility] to schedule the colp  4/12/18: Colpo biopsy c/w low and high grade changes--recommend LEEP  5/17/18: LEEP: mostly LGSIL, small focus HSIL--Pap and HPV 1 year.   5/22/18 Pt notified of results by clinic RN.   5/3/19 My Chart cotest reminder message sent (rlm)  6/5/19 NIL Pap, Neg HPV. Plan cotest in 1 year.   6/11/19 Pt viewed result on Prelerthart (tod)

## 2018-04-12 ENCOUNTER — OFFICE VISIT (OUTPATIENT)
Dept: OBGYN | Facility: CLINIC | Age: 38
End: 2018-04-12
Payer: COMMERCIAL

## 2018-04-12 VITALS
WEIGHT: 163 LBS | RESPIRATION RATE: 18 BRPM | TEMPERATURE: 98.4 F | DIASTOLIC BLOOD PRESSURE: 70 MMHG | SYSTOLIC BLOOD PRESSURE: 109 MMHG | HEART RATE: 81 BPM | BODY MASS INDEX: 22.82 KG/M2 | HEIGHT: 71 IN

## 2018-04-12 DIAGNOSIS — R87.612 PAPANICOLAOU SMEAR OF CERVIX WITH LOW GRADE SQUAMOUS INTRAEPITHELIAL LESION (LGSIL): Primary | ICD-10-CM

## 2018-04-12 PROCEDURE — 88305 TISSUE EXAM BY PATHOLOGIST: CPT | Performed by: OBSTETRICS & GYNECOLOGY

## 2018-04-12 PROCEDURE — 57455 BIOPSY OF CERVIX W/SCOPE: CPT | Performed by: OBSTETRICS & GYNECOLOGY

## 2018-04-12 PROCEDURE — 88341 IMHCHEM/IMCYTCHM EA ADD ANTB: CPT | Performed by: OBSTETRICS & GYNECOLOGY

## 2018-04-12 PROCEDURE — 88342 IMHCHEM/IMCYTCHM 1ST ANTB: CPT | Performed by: OBSTETRICS & GYNECOLOGY

## 2018-04-12 NOTE — PROGRESS NOTES
Era is a 38 year old  female who presents for colposcopy due to Pap smear on 8 showing, Abnormal, ASCUS, HPV positive type HR.  She is not  having postcoital bleeding.    Patient Active Problem List    Diagnosis Date Noted     LSIL on pap with +HR HPV 2018     Priority: Medium     LEEP at age 24 - not sure of results  18 Pap: LSIL, +HR HPV (neg ). Plan [place] due by 18       IUD (intrauterine device) in place 02/10/2015     Priority: Medium     Mirena IUD placed 2/10/2015         Moderate major depression (H) 2013     Priority: Medium     Has been on Celexa 40mg in past; changed to Zoloft without good control in pregnancy so changed back to Celexa  In counseling as well both solo and marital  Added Remeron 15mg as depression not well controlled; felt worse on Remeron, so changed to Cymbalta  Back to Celexa 40mg due to insurance reasons   Added Wellbutrin 2/15       Lactose intolerance 2012     Priority: Medium     Intermittent asthma      Priority: Medium     CARDIOVASCULAR SCREENING; LDL GOAL LESS THAN 160 10/31/2010     Priority: Medium       Past Medical History:   Diagnosis Date     Anxiety      Chickenpox      Depression      GERD (gastroesophageal reflux disease)      HPV (human papilloma virus) infection      IBS (irritable bowel syndrome)      Intermittent asthma      Papanicolaou smear of cervix with low grade squamous intraepithelial lesion (LGSIL) 2018     S/P LEEP of cervix age 24    path results?     Urinary tract infections        Past Surgical History:   Procedure Laterality Date     LAPAROSCOPIC APPENDECTOMY  2012    Procedure: LAPAROSCOPIC APPENDECTOMY;  Laparoscopic Appendectomy;  Surgeon: Jorge Luis Tan MD;  Location: [place]     LEEP [place], CERVICAL  age 24     MOUTH SURGERY      wisdom teeth       Diflucan [fluconazole] and Augmentin    Current Birth Control Method: Mirena IUD  Smoker: no  Hx of Genital Herpes?: no  HIV Status:  "unknown  Hx of partners with Genital Warts?: no    Previous Cryo?: no  Previous LEEP?: yes - age 24  Previous Laser?: no    Referred by:  self    /70 (BP Location: Right arm, Patient Position: Chair, Cuff Size: Adult Small)  Pulse 81  Temp 98.4  F (36.9  C) (Tympanic)  Resp 18  Ht 5' 11\" (1.803 m)  Wt 163 lb (73.9 kg)  BMI 22.73 kg/m2    No LMP recorded. Patient is not currently having periods (Reason: IUD).    Written informed consent obtained.  A colposcopic exam  was performed using plain view,  And acetic acid staining of the cervix/vaginal vault.  Specimens were taken, as indicated below, either from cervix or vaginal vault with biopsy forceps and if indicated, with an endocervical sampler.  Specimens were submitted in formalin for pathologic evaluation    COLPOSCOPIC EXAM: satisfactory    Examined without staining?: yes - IUD strings visible  Examined with Acetic Acid staining?: yes - AWE and pcn noted between 10 and 1 oclock  Examined with Lugol's Iodine staining?: no  Vagina examined?: no  Vulva examined?: no               Specimens taken: cervical biopsy x 2    ASSESSMENT: LSIL with Pos HR HPV    PLAN: Specimens labelled and sent to pathology., Will base further treatment on pathology findings., post biopsy instructions given to patient and call to discuss Pathology results  Sabiha Chavarria MD  [facility]      "

## 2018-04-12 NOTE — NURSING NOTE
"Initial /70 (BP Location: Right arm, Patient Position: Chair, Cuff Size: Adult Small)  Pulse 81  Temp 98.4  F (36.9  C) (Tympanic)  Resp 18  Ht 5' 11\" (1.803 m)  Wt 163 lb (73.9 kg)  BMI 22.73 kg/m2 Estimated body mass index is 22.73 kg/(m^2) as calculated from the following:    Height as of this encounter: 5' 11\" (1.803 m).    Weight as of this encounter: 163 lb (73.9 kg). .      "

## 2018-04-12 NOTE — MR AVS SNAPSHOT
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  Normal or non-critical lab and imaging results will be communicated to you by HRsofthart, letter or phone within 4 business days after the clinic has received the results. If you do not hear from us within 7 days, please contact the clinic through DonorSearcht or phone. If you have a critical or abnormal lab result, we will notify you by phone as soon as possible.  Submit refill requests through [employer] or call your pharmacy and they will forward the refill request to us. Please allow 3 business days for your refill to be completed.          Additional Information About Your Visit        MyChart Information     MyGeekDay gives you secure access to your electronic health record. If you see a primary care provider, you can also send messages to your care team and make appointments. If you have questions, please call your primary care clinic.  If you do not have a primary care provider, please call [phone] and they will assist you.        Care EveryWhere ID     This is your Care EveryWhere ID. This could be used by other organizations to access your [place] medical records  [id number]        Your Vitals Were     Pulse Temperature Respirations Height BMI (Body Mass Index)       81 98.4  F (36.9  C) (Tympanic) 18 5' 11\" (1.803 m) 22.73 kg/m2        Blood Pressure from Last 3 Encounters:   04/12/18 109/70   02/16/18 106/62   08/27/17 113/70    Weight from " Last 3 Encounters:   04/12/18 163 lb (73.9 kg)   02/16/18 170 lb (77.1 kg)   11/17/17 165 lb (74.8 kg)              We Performed the Following     COLP CERVIX/UPPER VAGINA W BX CERVIX     Surgical pathology exam        Primary Care Provider Office Phone # Fax #    Sabiha Manisha Chavarria [phone] [address] [facility] [address]        Equal Access to Services     ANJUM CHOW : Hadii aad ku hadasho Soomaali, waaxda luqadaha, qaybta kaalmada adeegyada, waxay idiin hayaan adeeg kharash la'zainab . So [facility] [phone].    ATENCIÓN: Si habla español, tiene a dover disposición servicios gratuitos de asistencia lingüística. [facility] [phone].    We comply with applicable federal civil rights laws and [place] laws. We do not discriminate on the basis of race, color, national origin, age, disability, sex, sexual orientation, or gender identity.            Thank you!     Thank you for choosing [facility]  for your care. Our goal is always to provide you with excellent care. Hearing back from our patients is one way we can continue to improve our services. Please take a few minutes to complete the written survey that you may receive in the mail after your visit with us. Thank you!             Your Updated Medication List - Protect others around you: Learn how to safely use, store and throw away your medicines at www.disposemymeds.org.          This list is accurate as of 4/12/18  2:26 PM.  Always use your most recent med list.                   Brand Name Dispense Instructions for use Diagnosis    ACIDOPHILUS PO      Take 1 tablet by mouth daily.        ALBUTEROL IN      AS NEEDED        ALPRAZolam 0.5 MG tablet    XANAX    40 tablet    Take 1 tablet (0.5 mg) by mouth 3 times daily as needed for anxiety    Major depressive disorder, recurrent episode, moderate (H)       buPROPion 300 MG 24 hr tablet    WELLBUTRIN XL    90 tablet    Take 1 tablet (300 mg) by  mouth every morning    Major depressive disorder, recurrent episode, moderate (H)       calcium carbonate-vitamin D 600-400 MG-UNIT Chew      Take 1 chew tab by mouth daily        citalopram 40 MG tablet    celeXA    135 tablet    Take 1.5 tablets (60 mg) by mouth daily    Major depressive disorder, recurrent episode, moderate (H)       fluticasone 50 MCG/ACT spray    FLONASE    1 Bottle    Spray 2 sprays into both nostrils daily    Chronic seasonal allergic rhinitis, unspecified trigger       fluticasone-salmeterol 500-50 MCG/DOSE diskus inhaler    ADVAIR     Inhale 1 puff into the lungs every 12 hours.        ibuprofen 400 MG tablet    ADVIL/MOTRIN    120 tablet    Take 1-2 tablets by mouth every 6 hours as needed (cramping).     (normal spontaneous vaginal delivery)       LANsoprazole 30 MG CR capsule    PREVACID    180 capsule    Take 1 capsule (30 mg) by mouth 2 times daily    Gastroesophageal reflux disease without esophagitis       order for DME     2 Device    Equipment being ordered: seasonal affective disorder UV lamp    Seasonal affective disorder (H)       spironolactone 100 MG tablet    ALDACTONE    90 tablet    Take 1 tablet (100 mg) by mouth daily    Acne vulgaris       SUDAFED 12 HOUR PO      Take 1 tablet by mouth once.        * terbinafine 250 MG tablet    lamISIL    30 tablet    Take 1 tablet (250 mg) by mouth daily    Dermatophytosis of nail       * terbinafine 250 MG tablet    lamISIL    90 tablet    Take 1 tablet (250 mg) by mouth daily    Dermatophytosis of nail       VITAMIN C PO      Take 1,000 mg by mouth daily.        VITAMIN D3 PO      Take 2,000 Units by mouth daily.        * Notice:  This list has 2 medication(s) that are the same as other medications prescribed for you. Read the directions carefully, and ask your doctor or other care provider to review them with you.

## 2018-04-17 LAB — COPATH REPORT: NORMAL

## 2018-04-18 PROBLEM — R87.613 HIGH GRADE SQUAMOUS INTRAEPITHELIAL LESION OF CERVIX: Status: ACTIVE | Noted: 2018-02-27

## 2018-05-03 DIAGNOSIS — F33.1 MAJOR DEPRESSIVE DISORDER, RECURRENT EPISODE, MODERATE (H): ICD-10-CM

## 2018-05-03 DIAGNOSIS — K21.9 GASTROESOPHAGEAL REFLUX DISEASE WITHOUT ESOPHAGITIS: ICD-10-CM

## 2018-05-03 DIAGNOSIS — L70.0 ACNE VULGARIS: ICD-10-CM

## 2018-05-03 RX ORDER — SPIRONOLACTONE 100 MG/1
100 TABLET, FILM COATED ORAL DAILY
Qty: 90 TABLET | Refills: 3 | Status: SHIPPED | OUTPATIENT
Start: 2018-05-03 | End: 2018-12-21

## 2018-05-03 RX ORDER — LANSOPRAZOLE 30 MG/1
30 CAPSULE, DELAYED RELEASE ORAL 2 TIMES DAILY
Qty: 180 CAPSULE | Refills: 3 | Status: SHIPPED | OUTPATIENT
Start: 2018-05-03 | End: 2018-12-21

## 2018-05-03 RX ORDER — BUPROPION HYDROCHLORIDE 300 MG/1
300 TABLET ORAL EVERY MORNING
Qty: 90 TABLET | Refills: 3 | Status: SHIPPED | OUTPATIENT
Start: 2018-05-03 | End: 2018-12-21

## 2018-05-03 RX ORDER — CITALOPRAM HYDROBROMIDE 40 MG/1
60 TABLET ORAL DAILY
Qty: 135 TABLET | Refills: 3 | Status: SHIPPED | OUTPATIENT
Start: 2018-05-03 | End: 2018-12-21

## 2018-05-03 NOTE — TELEPHONE ENCOUNTER
Wellbutrin and Citalopram not refillable by RN due to PHQ-9 scores as below.  Will send patient [employer] message to reassess.    PHQ-9 SCORE 2/3/2014 8/14/2015 2/16/2018   Total Score - 16 -   Total Score MyChart 14 - -   Total Score - - 10       Will send to provider to review and advise on refills.  Patient is requesting use of new mail order pharmacy so new prescriptions are needed.  Meds are cued in med order section    Patricia Matos   Ob/Gyn Clinic  RN

## 2018-05-03 NOTE — TELEPHONE ENCOUNTER
Requested Prescriptions   Pending Prescriptions Disp Refills     buPROPion (WELLBUTRIN XL) 300 MG 24 hr tablet 90 tablet 3     Sig: Take 1 tablet (300 mg) by mouth every morning    There is no refill protocol information for this order         spironolactone (ALDACTONE) 100 MG tablet 90 tablet 3     Sig: Take 1 tablet (100 mg) by mouth daily    There is no refill protocol information for this order        citalopram (CELEXA) 40 MG tablet 135 tablet 3     Sig: Take 1.5 tablets (60 mg) by mouth daily    There is no refill protocol information for this order        LANsoprazole (PREVACID) 30 MG CR capsule 180 capsule 3     Sig: Take 1 capsule (30 mg) by mouth 2 times daily    There is no refill protocol information for this order

## 2018-05-04 ENCOUNTER — TELEPHONE (OUTPATIENT)
Dept: OBGYN | Facility: CLINIC | Age: 38
End: 2018-05-04

## 2018-05-04 NOTE — TELEPHONE ENCOUNTER
Reason for Call:  Other prescription    Detailed comments: Received high dosage of Celexa 40 mg.  Wanting to clarify - ok to fill this?  Maximum daily dosage is 1 tablet daily, rx is for 1.5 daily.    Phone Number Patient can be reached at: [phone]    Best Time: any    Can we leave a detailed message on this number? YES    Call taken on 5/4/2018 at 2:34 PM by Melba Mera

## 2018-05-07 NOTE — TELEPHONE ENCOUNTER
Sabiha Chavarria MD   [facility] [place] [facility] 3 days ago                 Notify pt that the RX they inquired about is correct   Sabiha Chavarria MD   [facility] (Routing comment)

## 2018-05-17 ENCOUNTER — OFFICE VISIT (OUTPATIENT)
Dept: OBGYN | Facility: CLINIC | Age: 38
End: 2018-05-17
Payer: COMMERCIAL

## 2018-05-17 VITALS
SYSTOLIC BLOOD PRESSURE: 80 MMHG | HEIGHT: 71 IN | TEMPERATURE: 98.2 F | WEIGHT: 159.2 LBS | RESPIRATION RATE: 16 BRPM | BODY MASS INDEX: 22.29 KG/M2 | DIASTOLIC BLOOD PRESSURE: 50 MMHG | HEART RATE: 85 BPM

## 2018-05-17 DIAGNOSIS — Z97.5 IUD (INTRAUTERINE DEVICE) IN PLACE: ICD-10-CM

## 2018-05-17 DIAGNOSIS — R87.613 HIGH GRADE SQUAMOUS INTRAEPITHELIAL LESION OF CERVIX: Primary | ICD-10-CM

## 2018-05-17 PROCEDURE — 88307 TISSUE EXAM BY PATHOLOGIST: CPT | Performed by: OBSTETRICS & GYNECOLOGY

## 2018-05-17 PROCEDURE — 57522 CONIZATION OF CERVIX: CPT | Performed by: OBSTETRICS & GYNECOLOGY

## 2018-05-17 NOTE — MR AVS SNAPSHOT
"              After Visit Summary   5/17/2018    Era Garland    MRN: [id number]           Patient Information     Date Of Birth          1980        Visit Information        Provider Department      5/17/2018 9:30 AM Sabiha Chavarria MD; [facility]        Today's Diagnoses     High grade squamous intraepithelial lesion of cervix    -  1    IUD (intrauterine device) in place           Follow-ups after your visit        Who to contact     If you have questions or need follow up information about today's clinic visit or your schedule please contact [facility] directly at [phone].  Normal or non-critical lab and imaging results will be communicated to you by Profighart, letter or phone within 4 business days after the clinic has received the results. If you do not hear from us within 7 days, please contact the clinic through Profighart or phone. If you have a critical or abnormal lab result, we will notify you by phone as soon as possible.  Submit refill requests through [employer] or call your pharmacy and they will forward the refill request to us. Please allow 3 business days for your refill to be completed.          Additional Information About Your Visit        MyChart Information     [employer] gives you secure access to your electronic health record. If you see a primary care provider, you can also send messages to your care team and make appointments. If you have questions, please call your primary care clinic.  If you do not have a primary care provider, please call [phone] and they will assist you.        Care EveryWhere ID     This is your Care EveryWhere ID. This could be used by other organizations to access your [place] medical records  [id number]        Your Vitals Were     Pulse Temperature Respirations Height Breastfeeding? BMI (Body Mass Index)    85 98.2  F (36.8  C) (Tympanic) 16 5' 11\" (1.803 m) No 22.2 kg/m2       Blood Pressure from Last " 3 Encounters:   05/17/18 (!) 80/50   04/12/18 109/70   02/16/18 106/62    Weight from Last 3 Encounters:   05/17/18 159 lb 3.2 oz (72.2 kg)   04/12/18 163 lb (73.9 kg)   02/16/18 170 lb (77.1 kg)              We Performed the Following     CONIZATION CERVIX, LOOP ELECTRODE EXCISION        Primary Care Provider Office Phone # Fax #    Sabiha Manisha Chavarria [phone] [address] [facility] [address]        Equal Access to Services     DMITRIY CHOW : Hadii cortez jang Sovania, wanatan anderson, dorothy kaalmaammy lopez, brianna hawthorne . So [facility] [phone].    ATENCIÓN: Si habla español, tiene a dover disposición servicios gratuitos de asistencia lingüística. Ll[facility] [phone].    We comply with applicable federal civil rights laws and [place] laws. We do not discriminate on the basis of race, color, national origin, age, disability, sex, sexual orientation, or gender identity.            Thank you!     Thank you for choosing [facility]  for your care. Our goal is always to provide you with excellent care. Hearing back from our patients is one way we can continue to improve our services. Please take a few minutes to complete the written survey that you may receive in the mail after your visit with us. Thank you!             Your Updated Medication List - Protect others around you: Learn how to safely use, store and throw away your medicines at www.disposemymeds.org.          This list is accurate as of 5/17/18 10:06 AM.  Always use your most recent med list.                   Brand Name Dispense Instructions for use Diagnosis    ACIDOPHILUS PO      Take 1 tablet by mouth daily.        ALBUTEROL IN      AS NEEDED        ALPRAZolam 0.5 MG tablet    XANAX    40 tablet    Take 1 tablet (0.5 mg) by mouth 3 times daily as needed for anxiety    Major depressive disorder, recurrent episode, moderate (H)       buPROPion 300 MG 24  hr tablet    WELLBUTRIN XL    90 tablet    Take 1 tablet (300 mg) by mouth every morning    Major depressive disorder, recurrent episode, moderate (H)       calcium carbonate-vitamin D 600-400 MG-UNIT Chew      Take 1 chew tab by mouth daily        citalopram 40 MG tablet    celeXA    135 tablet    Take 1.5 tablets (60 mg) by mouth daily    Major depressive disorder, recurrent episode, moderate (H)       fluticasone 50 MCG/ACT spray    FLONASE    1 Bottle    Spray 2 sprays into both nostrils daily    Chronic seasonal allergic rhinitis, unspecified trigger       fluticasone-salmeterol 500-50 MCG/DOSE diskus inhaler    ADVAIR     Inhale 1 puff into the lungs every 12 hours.        ibuprofen 400 MG tablet    ADVIL/MOTRIN    120 tablet    Take 1-2 tablets by mouth every 6 hours as needed (cramping).     (normal spontaneous vaginal delivery)       LANsoprazole 30 MG CR capsule    PREVACID    180 capsule    Take 1 capsule (30 mg) by mouth 2 times daily    Gastroesophageal reflux disease without esophagitis       order for DME     2 Device    Equipment being ordered: seasonal affective disorder UV lamp    Seasonal affective disorder (H)       spironolactone 100 MG tablet    ALDACTONE    90 tablet    Take 1 tablet (100 mg) by mouth daily    Acne vulgaris       SUDAFED 12 HOUR PO      Take 1 tablet by mouth once.        * terbinafine 250 MG tablet    lamISIL    30 tablet    Take 1 tablet (250 mg) by mouth daily    Dermatophytosis of nail       * terbinafine 250 MG tablet    lamISIL    90 tablet    Take 1 tablet (250 mg) by mouth daily    Dermatophytosis of nail       VITAMIN C PO      Take 1,000 mg by mouth daily.        VITAMIN D3 PO      Take 2,000 Units by mouth daily.        * Notice:  This list has 2 medication(s) that are the same as other medications prescribed for you. Read the directions carefully, and ask your doctor or other care provider to review them with you.

## 2018-05-17 NOTE — PROGRESS NOTES
SUBJECTIVE:  Era is a 38 year old   female who presents for LEEP procedure.  Her last Pap was Abnormal, LGSIL on 2/18.    She underwent colposcopy by Dr. Chavarria, on 4/18, results showing low and high grade changes.  No LMP recorded. Patient is not currently having periods (Reason: IUD)..  She is currently using IUD.        PROCEDURE:    After obtaining written informed consent,  the patient was placed in the supine position, and a coated speculum placed within the vagina to visualize the cervix.  The cervix was then treated with a Lugol's solution to identify the entire transformation zone.  The cervix was then infiltrated with a mixture of 1% lidocaine/2% lidocaine with epinephrine, until anesthesia was achieved.  Utilizing a Loop cautery, the entire transformation zone was excised and sent for pathologic examination.  Hemostasis was obtained with monopolar cautery and Monsel's solution. The pt tolerated the procedure well.    ASSESSMENT:  High grade lesion of cervix    PLAN:  Will check the results of the pathology from LEEP biopsy and contact the patient.  Pap tests are recommended every  12 months x 2 yrs.  Bleeding precautions were reviewed with the patient.  Sabiha Chavarria MD  [facility] [place]

## 2018-05-21 LAB — COPATH REPORT: NORMAL

## 2018-05-22 ENCOUNTER — TELEPHONE (OUTPATIENT)
Dept: OBGYN | Facility: CLINIC | Age: 38
End: 2018-05-22

## 2018-05-22 NOTE — TELEPHONE ENCOUNTER
Return call to patient.  Spoke with patient on the phone.    S-(situation): patient has questions regarding discharge following LEEP procedure. Patient reports mostly brownish discharge with some bright red. Patient reports quantity is not anything more than what she would expect. she is not saturating more than a pad per hour or having difficulty controlling the flow. Patient denies significant pain or fever. Patient denies foul odor.     B-(background): LEEP 5/17/18    A-(assessment): normal discharge following LEEP procedure.    R-(recommendations): Reassurance provided. Reviewed abnormal qualities of expected discharge. Monitor for fever or significant pain. Reviewed post care instructions.    Pt in agreement and reports understanding.    Patricia Matos   Ob/Gyn Clinic  RN

## 2018-05-22 NOTE — PROGRESS NOTES
Pt notified of below.  Pt reports understanding.  Pt does not have further questions or concerns.  See telephone encounter for further details.    Patricia Matos   Ob/Gyn Clinic  RN

## 2018-05-22 NOTE — TELEPHONE ENCOUNTER
Reason for Call:  Other     Detailed comments: Pt had a LEEP procedure on 05/17. She is concerned about discharge and bleeding she is having now. - Please advise    Phone Number Patient can be reached at: Home number on file [phone] (home)    Best Time: Before 9:30 am    Can we leave a detailed message on this number? YES    Call taken on 5/22/2018 at 8:04 AM by Denise Behrendt

## 2018-05-31 ENCOUNTER — OFFICE VISIT (OUTPATIENT)
Dept: FAMILY MEDICINE | Facility: CLINIC | Age: 38
End: 2018-05-31
Payer: COMMERCIAL

## 2018-05-31 VITALS
WEIGHT: 160.8 LBS | DIASTOLIC BLOOD PRESSURE: 72 MMHG | HEART RATE: 83 BPM | TEMPERATURE: 98.5 F | BODY MASS INDEX: 22.51 KG/M2 | HEIGHT: 71 IN | OXYGEN SATURATION: 97 % | SYSTOLIC BLOOD PRESSURE: 103 MMHG

## 2018-05-31 DIAGNOSIS — F32.1 MODERATE MAJOR DEPRESSION (H): ICD-10-CM

## 2018-05-31 DIAGNOSIS — R05.9 COUGH: ICD-10-CM

## 2018-05-31 DIAGNOSIS — J45.20 MILD INTERMITTENT ASTHMA WITHOUT COMPLICATION: Primary | ICD-10-CM

## 2018-05-31 PROCEDURE — 99213 OFFICE O/P EST LOW 20 MIN: CPT | Performed by: PHYSICIAN ASSISTANT

## 2018-05-31 RX ORDER — DOXYCYCLINE 100 MG/1
100 CAPSULE ORAL 2 TIMES DAILY
Qty: 20 CAPSULE | Refills: 0 | Status: SHIPPED | OUTPATIENT
Start: 2018-05-31 | End: 2018-06-10

## 2018-05-31 RX ORDER — AZITHROMYCIN 250 MG/1
TABLET, FILM COATED ORAL
Qty: 6 TABLET | Refills: 0 | Status: CANCELLED | OUTPATIENT
Start: 2018-05-31

## 2018-05-31 RX ORDER — ALBUTEROL SULFATE 90 UG/1
2 AEROSOL, METERED RESPIRATORY (INHALATION) EVERY 6 HOURS PRN
Qty: 1 INHALER | Refills: 3 | Status: SHIPPED | OUTPATIENT
Start: 2018-05-31 | End: 2021-01-18

## 2018-05-31 NOTE — PATIENT INSTRUCTIONS
Have plenty of rest and fluids  Humidified air can be very helpful with cough  Use inhaler every 4-6 hours during this illness  Continue flonase, mucinex  Use neti pot/saline rinse  If not improving, use doxycycline  Follow up if worsening symptoms or if not improving  Be seen urgently if worsening breathing     Use albuterol inhaler 30 minutes before exercise

## 2018-05-31 NOTE — LETTER
My Depression Action Plan  Name: Era Garland   Date of Birth 1980  Date: 5/31/2018    My doctor: Sabiha Chavarria   My clinic: [address] [address] [phone]          GREEN    ZONE   Good Control    What it looks like:     Things are going generally well. You have normal up s and down s. You may even feel depressed from time to time, but bad moods usually last less than a day.   What you need to do:  1. Continue to care for yourself (see self care plan)  2. Check your depression survival kit and update it as needed  3. Follow your physician s recommendations including any medication.  4. Do not stop taking medication unless you consult with your physician first.           YELLOW         ZONE Getting Worse    What it looks like:     Depression is starting to interfere with your life.     It may be hard to get out of bed; you may be starting to isolate yourself from others.    Symptoms of depression are starting to last most all day and this has happened for several days.     You may have suicidal thoughts but they are not constant.   What you need to do:     1. Call your care team, your response to treatment will improve if you keep your care team informed of your progress. Yellow periods are signs an adjustment may need to be made.     2. Continue your self-care, even if you have to fake it!    3. Talk to someone in your support network    4. Open up your depression survival kit           RED    ZONE Medical Alert - Get Help    What it looks like:     Depression is seriously interfering with your life.     You may experience these or other symptoms: You can t get out of bed most days, can t work or engage in other necessary activities, you have trouble taking care of basic hygiene, or basic responsibilities, thoughts of suicide or death that will not go away, self-injurious behavior.     What you need to do:  1. Call your care team and  request a same-day appointment. If they are not available (weekends or after hours) call your local crisis line, emergency room or 911.            Depression Self Care Plan / Survival Kit    Self-Care for Depression  Here s the deal. Your body and mind are really not as separate as most people think.  What you do and think affects how you feel and how you feel influences what you do and think. This means if you do things that people who feel good do, it will help you feel better.  Sometimes this is all it takes.  There is also a place for medication and therapy depending on how severe your depression is, so be sure to consult with your medical provider and/ or Behavioral Health Consultant if your symptoms are worsening or not improving.     In order to better manage my stress, I will:    Exercise  Get some form of exercise, every day. This will help reduce pain and release endorphins, the  feel good  chemicals in your brain. This is almost as good as taking antidepressants!  This is not the same as joining a gym and then never going! (they count on that by the way ) It can be as simple as just going for a walk or doing some gardening, anything that will get you moving.      Hygiene   Maintain good hygiene (Get out of bed in the morning, Make your bed, Brush your teeth, Take a shower, and Get dressed like you were going to work, even if you are unemployed).  If your clothes don't fit try to get ones that do.    Diet  I will strive to eat foods that are good for me, drink plenty of water, and avoid excessive sugar, caffeine, alcohol, and other mood-altering substances.  Some foods that are helpful in depression are: complex carbohydrates, B vitamins, flaxseed, fish or fish oil, fresh fruits and vegetables.    Psychotherapy  I agree to participate in Individual Therapy (if recommended).    Medication  If prescribed medications, I agree to take them.  Missing doses can result in serious side effects.  I understand that  drinking alcohol, or other illicit drug use, may cause potential side effects.  I will not stop my medication abruptly without first discussing it with my provider.    Staying Connected With Others  I will stay in touch with my friends, family members, and my primary care provider/team.    Use your imagination  Be creative.  We all have a creative side; it doesn t matter if it s oil painting, sand castles, or mud pies! This will also kick up the endorphins.    Witness Beauty  (AKA stop and smell the roses) Take a look outside, even in mid-winter. Notice colors, textures. Watch the squirrels and birds.     Service to others  Be of service to others.  There is always someone else in need.  By helping others we can  get out of ourselves  and remember the really important things.  This also provides opportunities for practicing all the other parts of the program.    Humor  Laugh and be silly!  Adjust your TV habits for less news and crime-drama and more comedy.    Control your stress  Try breathing deep, massage therapy, biofeedback, and meditation. Find time to relax each day.     My support system    Clinic Contact:  Phone number:    Contact 1:  Phone number:    Contact 2:  Phone number:    Mormonism/:  Phone number:    Therapist:  Phone number:    Local crisis center:    Phone number:    Other community support:  Phone number:

## 2018-05-31 NOTE — PROGRESS NOTES
SUBJECTIVE:   Era Garland is a 38 year old female who presents to clinic today for the following health issues:      ENT Symptoms             Symptoms: cc Present Absent Comment   Fever/Chills   x    Fatigue  x  Not sleeping due to cough   Muscle Aches   x Neck is a little sore   Eye Irritation  x  Irritation, redness and matter    Sneezing   x    Nasal Garrett/Drg x x  Congestion and a lot of pressure, drain   Sinus Pressure/Pain x x     Loss of smell   x    Dental pain  x  Right side   Sore Throat   x irritation from coughing   Swollen Glands  x  Yes right side pressure and pain   Ear Pain/Fullness x x  Ear pain in right ear   Cough x x  Productive, green   Wheeze   x    Chest Pain   x Chest tightness and burning sensation with cough   Shortness of breath  x  Some with coughing   Rash   x    Other x x  Headaches      Symptom duration:  1 week   Symptom severity:  moderate   Treatments tried:  Musinex cough and congestion, last dose taken at 5 am. flonase   Contacts:  Was recently traveling      Has not had advair for a while  She uses rescue inhaler before exercise but is out  This is primary time of symptoms   No seasonal allergies      Problem list and histories reviewed & adjusted, as indicated.  Additional history: as documented    Patient Active Problem List   Diagnosis     Intermittent asthma     Lactose intolerance     Moderate major depression (H)     IUD (intrauterine device) in place     High grade squamous intraepithelial lesion of cervix     Past Surgical History:   Procedure Laterality Date     LAPAROSCOPIC APPENDECTOMY  6/20/2012    Procedure: LAPAROSCOPIC APPENDECTOMY;  Laparoscopic Appendectomy;  Surgeon: Jorge Luis Tan MD;  Location: [facility], CERVICAL  age 24     MOUTH SURGERY      wisdom teeth       Social History   Substance Use Topics     Smoking status: Never Smoker     Smokeless tobacco: Never Used     Alcohol use Yes      Comment: 3 drinks weekly- quit with pregnancy      "Family History   Problem Relation Age of Onset     Depression Mother      GASTROINTESTINAL DISEASE Mother      Allergies Father      Eye Disorder Father      Hypertension Father      OSTEOPOROSIS Paternal Grandmother      Hypertension Paternal Grandmother      Alzheimer Disease Paternal Grandfather      Asthma Brother      Depression Brother      Cardiovascular Maternal Grandfather      MI     Alcohol/Drug Maternal Grandmother            Reviewed and updated as needed this visit by clinical staff  Tobacco  Allergies  Meds  Problems       Reviewed and updated as needed this visit by Provider  Allergies  Meds  Problems         ROS:  Other than noted above, general, HEENT, respiratory, cardiac, MS, and gastrointestinal systems are negative.     OBJECTIVE:     /72  Pulse 83  Temp 98.5  F (36.9  C) (Tympanic)  Ht 5' 11\" (1.803 m)  Wt 160 lb 12.8 oz (72.9 kg)  BMI 22.43 kg/m2  Body mass index is 22.43 kg/(m^2).  GENERAL: healthy, alert and no distress  HENT: ear canals and TM's normal, nose and mouth without ulcers or lesions POSITIVE nasal congestion, sinus pressure to palpation  NECK: no adenopathy, no asymmetry, masses, or scars and thyroid normal to palpation  RESP: lungs clear to auscultation - no rales, rhonchi or wheezes  CV: regular rate and rhythm, normal S1 S2, no S3 or S4, no murmur, click or rub, no peripheral edema and peripheral pulses strong  ABDOMEN: soft, nontender, no hepatosplenomegaly, no masses and bowel sounds normal  MS: no gross musculoskeletal defects noted, no edema    ASSESSMENT/PLAN:     ASSESSMENT/PLAN:      ICD-10-CM    1. Mild intermittent asthma without complication J45.20 albuterol (PROAIR HFA/PROVENTIL HFA/VENTOLIN HFA) 108 (90 Base) MCG/ACT Inhaler     Asthma Action Plan (AAP)   2. Cough R05 doxycycline monohydrate 100 MG capsule   3. Moderate major depression (H) F32.1 DEPRESSION ACTION PLAN (DAP)       Patient Instructions   Have plenty of rest and fluids  Humidified " air can be very helpful with cough  Use inhaler every 4-6 hours during this illness  Continue flonase, mucinex  Use neti pot/saline rinse  If not improving, use doxycycline  Follow up if worsening symptoms or if not improving  Be seen urgently if worsening breathing     Use albuterol inhaler 30 minutes before exercise    Jennifer Trent PA-C  [facility]

## 2018-05-31 NOTE — MR AVS SNAPSHOT
After Visit Summary   5/31/2018    Era Garland    MRN: [id number]           Patient Information     Date Of Birth          1980        Visit Information        Provider Department      5/31/2018 8:20 AM Jennifer Trent PA-C [facility]        Today's Diagnoses     Mild intermittent asthma without complication    -  1    Cough          Care Instructions    Have plenty of rest and fluids  Humidified air can be very helpful with cough  Use inhaler every 4-6 hours during this illness  Continue flonase, mucinex  Use neti pot/saline rinse  If not improving, use doxycycline  Follow up if worsening symptoms or if not improving  Be seen urgently if worsening breathing     Use albuterol inhaler 30 minutes before exercise          Follow-ups after your visit        Who to contact     Normal or non-critical lab and imaging results will be communicated to you by PneumRxt, letter or phone within 4 business days after the clinic has received the results. If you do not hear from us within 7 days, please contact the clinic through PneumRxt or phone. If you have a critical or abnormal lab result, we will notify you by phone as soon as possible.  Submit refill requests through [employer] or call your pharmacy and they will forward the refill request to us. Please allow 3 business days for your refill to be completed.          If you need to speak with a  for additional information , please call: [phone]             Additional Information About Your Visit        [employer] Information     [employer] gives you secure access to your electronic health record. If you see a primary care provider, you can also send messages to your care team and make appointments. If you have questions, please call your primary care clinic.  If you do not have a primary care provider, please call [phone] and they will assist you.        Care EveryWhere ID     This is your Care EveryWhere ID. This could be  "used by other organizations to access your [place] medical records  [id number]        Your Vitals Were     Pulse Temperature Height Pulse Oximetry BMI (Body Mass Index)       83 98.5  F (36.9  C) (Tympanic) 5' 11\" (1.803 m) 97% 22.43 kg/m2        Blood Pressure from Last 3 Encounters:   05/31/18 103/72   05/17/18 (!) 80/50   04/12/18 109/70    Weight from Last 3 Encounters:   05/31/18 160 lb 12.8 oz (72.9 kg)   05/17/18 159 lb 3.2 oz (72.2 kg)   04/12/18 163 lb (73.9 kg)              We Performed the Following     Asthma Action Plan (AAP)     DEPRESSION ACTION PLAN (DAP)          Today's Medication Changes          These changes are accurate as of 5/31/18  9:04 AM.  If you have any questions, ask your nurse or doctor.               Start taking these medicines.        Dose/Directions    albuterol 108 (90 Base) MCG/ACT Inhaler   Commonly known as:  PROAIR HFA/PROVENTIL HFA/VENTOLIN HFA   Used for:  Mild intermittent asthma without complication   Started by:  Jennifer Trent PA-C        Dose:  2 puff   Inhale 2 puffs into the lungs every 6 hours as needed for shortness of breath / dyspnea or wheezing   Quantity:  1 Inhaler   Refills:  3       doxycycline monohydrate 100 MG capsule   Used for:  Cough   Started by:  Jennifer Trent PA-C        Dose:  100 mg   Take 1 capsule (100 mg) by mouth 2 times daily for 10 days   Quantity:  20 capsule   Refills:  0            Where to get your medicines      These medications were sent to [facility] [place] - [address] [facility]  [address] [facility] [place] [address]     Phone:  [phone]     albuterol 108 (90 Base) MCG/ACT Inhaler    doxycycline monohydrate 100 MG capsule                Primary Care Provider Office Phone # Fax #    Sabiha Chavarria [phone] [address] [facility] [address]        Equal Access to Services     ANJUM CHOW AH: camryn Adame, " dorothy connor piterbrianna philip cesarin hayaan adeeg kharash la'aan ah. [facility] [phone].    ATENCIÓN: Si alejandro larose, tiene a dover disposición servicios gratuitos de asistencia lingüística. Yassine al [phone].    We comply with applicable federal civil rights laws and [place] laws. We do not discriminate on the basis of race, color, national origin, age, disability, sex, sexual orientation, or gender identity.            Thank you!     Thank you for choosing [facility]  for your care. Our goal is always to provide you with excellent care. Hearing back from our patients is one way we can continue to improve our services. Please take a few minutes to complete the written survey that you may receive in the mail after your visit with us. Thank you!             Your Updated Medication List - Protect others around you: Learn how to safely use, store and throw away your medicines at www.disposemymeds.org.          This list is accurate as of 5/31/18  9:04 AM.  Always use your most recent med list.                   Brand Name Dispense Instructions for use Diagnosis    ACIDOPHILUS PO      Take 1 tablet by mouth daily.        albuterol 108 (90 Base) MCG/ACT Inhaler    PROAIR HFA/PROVENTIL HFA/VENTOLIN HFA    1 Inhaler    Inhale 2 puffs into the lungs every 6 hours as needed for shortness of breath / dyspnea or wheezing    Mild intermittent asthma without complication       ALBUTEROL IN      AS NEEDED        ALPRAZolam 0.5 MG tablet    XANAX    40 tablet    Take 1 tablet (0.5 mg) by mouth 3 times daily as needed for anxiety    Major depressive disorder, recurrent episode, moderate (H)       buPROPion 300 MG 24 hr tablet    WELLBUTRIN XL    90 tablet    Take 1 tablet (300 mg) by mouth every morning    Major depressive disorder, recurrent episode, moderate (H)       calcium carbonate-vitamin D 600-400 MG-UNIT Chew      Take 1 chew tab by mouth daily        citalopram 40 MG tablet    celeXA    135 tablet     Take 1.5 tablets (60 mg) by mouth daily    Major depressive disorder, recurrent episode, moderate (H)       doxycycline monohydrate 100 MG capsule     20 capsule    Take 1 capsule (100 mg) by mouth 2 times daily for 10 days    Cough       fluticasone 50 MCG/ACT spray    FLONASE    1 Bottle    Spray 2 sprays into both nostrils daily    Chronic seasonal allergic rhinitis, unspecified trigger       fluticasone-salmeterol 500-50 MCG/DOSE diskus inhaler    ADVAIR     Inhale 1 puff into the lungs every 12 hours.        ibuprofen 400 MG tablet    ADVIL/MOTRIN    120 tablet    Take 1-2 tablets by mouth every 6 hours as needed (cramping).     (normal spontaneous vaginal delivery)       LANsoprazole 30 MG CR capsule    PREVACID    180 capsule    Take 1 capsule (30 mg) by mouth 2 times daily    Gastroesophageal reflux disease without esophagitis       order for DME     2 Device    Equipment being ordered: seasonal affective disorder UV lamp    Seasonal affective disorder (H)       spironolactone 100 MG tablet    ALDACTONE    90 tablet    Take 1 tablet (100 mg) by mouth daily    Acne vulgaris       SUDAFED 12 HOUR PO      Take 1 tablet by mouth once.        terbinafine 250 MG tablet    lamISIL    90 tablet    Take 1 tablet (250 mg) by mouth daily    Dermatophytosis of nail       VITAMIN C PO      Take 1,000 mg by mouth daily.        VITAMIN D3 PO      Take 2,000 Units by mouth daily.

## 2018-06-01 ASSESSMENT — ASTHMA QUESTIONNAIRES: ACT_TOTALSCORE: 21

## 2018-07-31 ENCOUNTER — TRANSFERRED RECORDS (OUTPATIENT)
Dept: HEALTH INFORMATION MANAGEMENT | Facility: CLINIC | Age: 38
End: 2018-07-31

## 2018-12-21 ENCOUNTER — OFFICE VISIT (OUTPATIENT)
Dept: OBGYN | Facility: CLINIC | Age: 38
End: 2018-12-21
Payer: COMMERCIAL

## 2018-12-21 VITALS
SYSTOLIC BLOOD PRESSURE: 95 MMHG | TEMPERATURE: 98.1 F | BODY MASS INDEX: 22.12 KG/M2 | HEART RATE: 74 BPM | RESPIRATION RATE: 18 BRPM | WEIGHT: 158 LBS | HEIGHT: 71 IN | DIASTOLIC BLOOD PRESSURE: 60 MMHG

## 2018-12-21 DIAGNOSIS — L70.0 ACNE VULGARIS: ICD-10-CM

## 2018-12-21 DIAGNOSIS — E78.00 HYPERCHOLESTEROLEMIA: Primary | ICD-10-CM

## 2018-12-21 DIAGNOSIS — F33.1 MAJOR DEPRESSIVE DISORDER, RECURRENT EPISODE, MODERATE (H): ICD-10-CM

## 2018-12-21 DIAGNOSIS — K21.9 GASTROESOPHAGEAL REFLUX DISEASE WITHOUT ESOPHAGITIS: ICD-10-CM

## 2018-12-21 DIAGNOSIS — J31.0 CHRONIC RHINITIS: ICD-10-CM

## 2018-12-21 DIAGNOSIS — Z23 NEED FOR PROPHYLACTIC VACCINATION AND INOCULATION AGAINST INFLUENZA: ICD-10-CM

## 2018-12-21 PROCEDURE — 90471 IMMUNIZATION ADMIN: CPT | Performed by: OBSTETRICS & GYNECOLOGY

## 2018-12-21 PROCEDURE — 90686 IIV4 VACC NO PRSV 0.5 ML IM: CPT | Performed by: OBSTETRICS & GYNECOLOGY

## 2018-12-21 PROCEDURE — 99213 OFFICE O/P EST LOW 20 MIN: CPT | Mod: 25 | Performed by: OBSTETRICS & GYNECOLOGY

## 2018-12-21 RX ORDER — CITALOPRAM HYDROBROMIDE 40 MG/1
60 TABLET ORAL DAILY
Qty: 135 TABLET | Refills: 3 | Status: SHIPPED | OUTPATIENT
Start: 2018-12-21 | End: 2019-06-24

## 2018-12-21 RX ORDER — FLUTICASONE PROPIONATE 50 MCG
1 SPRAY, SUSPENSION (ML) NASAL DAILY
Qty: 1 BOTTLE | Refills: 3 | Status: SHIPPED | OUTPATIENT
Start: 2018-12-21 | End: 2020-03-19

## 2018-12-21 RX ORDER — BUPROPION HYDROCHLORIDE 300 MG/1
300 TABLET ORAL EVERY MORNING
Qty: 90 TABLET | Refills: 3 | Status: SHIPPED | OUTPATIENT
Start: 2018-12-21 | End: 2020-03-23

## 2018-12-21 RX ORDER — SPIRONOLACTONE 100 MG/1
100 TABLET, FILM COATED ORAL DAILY
Qty: 90 TABLET | Refills: 3 | Status: SHIPPED | OUTPATIENT
Start: 2018-12-21 | End: 2020-03-19

## 2018-12-21 RX ORDER — LANSOPRAZOLE 30 MG/1
30 CAPSULE, DELAYED RELEASE ORAL 2 TIMES DAILY
Qty: 180 CAPSULE | Refills: 3 | Status: SHIPPED | OUTPATIENT
Start: 2018-12-21 | End: 2020-03-19

## 2018-12-21 ASSESSMENT — MIFFLIN-ST. JEOR: SCORE: 1497.81

## 2018-12-21 NOTE — PROGRESS NOTES
Injectable Influenza Immunization Documentation    1.  Is the person to be vaccinated sick today?   No    2. Does the person to be vaccinated have an allergy to a component   of the vaccine?   No  Egg Allergy Algorithm Link    3. Has the person to be vaccinated ever had a serious reaction   to influenza vaccine in the past?   No    4. Has the person to be vaccinated ever had Guillain-Barré syndrome?   No    Form completed by Evonne MA David Girard is a 38 year old   female who presents for refill of meds and review of outside lipid profile   labs: 10/16/18  T chol  246  LDL   175  HDL  62  Glucose  91    She requests refills of Spironolactone, Prevacid, Flonase, Citalopram and Wellbutrin  She feels she is stable on all her meds and does not feel any dose adjustments are needed.    Patient Active Problem List    Diagnosis Date Noted     High grade squamous intraepithelial lesion of cervix 2018     Priority: Medium     LEEP at age 24 - not sure of results  18 Pap: LSIL, +HR HPV (neg /18). Plan [place] due by 18: Colpo biopsy c/w low and high grade changes--recommend LEEP  18: LEEP: mostly LGSIL, small focus HSIL--Pap and HPV 1 year.        IUD (intrauterine device) in place 02/10/2015     Priority: Medium     Mirena IUD placed 2/10/2015  String cut short during LEEP procedure         Moderate major depression (H) 2013     Priority: Medium     Has been on Celexa 40mg in past; changed to Zoloft without good control in pregnancy so changed back to Celexa  In counseling as well both solo and marital  Added Remeron 15mg as depression not well controlled; felt worse on Remeron, so changed to Cymbalta  Back to Celexa 40mg due to insurance reasons   Added Wellbutrin 2/15       Lactose intolerance 2012     Priority: Medium     Intermittent asthma      Priority: Medium       All systems were reviewed and pertinent information in noted in subjective/HPI.    Past Medical  History:   Diagnosis Date     Anxiety      Chickenpox      Depression      GERD (gastroesophageal reflux disease)      HPV (human papilloma virus) infection      IBS (irritable bowel syndrome)      Intermittent asthma      Papanicolaou smear of cervix with low grade squamous intraepithelial lesion (LGSIL) 2/27/2018     S/P LEEP of cervix age 24    path results?     Urinary tract infections        Past Surgical History:   Procedure Laterality Date     LAPAROSCOPIC APPENDECTOMY  6/20/2012    Procedure: LAPAROSCOPIC APPENDECTOMY;  Laparoscopic Appendectomy;  Surgeon: Jorge Luis Tan MD;  Location: [place] [facility]     LEEP TX, CERVICAL  age 24     MOUTH SURGERY      wisdom teeth         Current Outpatient Medications:      albuterol (PROAIR HFA/PROVENTIL HFA/VENTOLIN HFA) 108 (90 Base) MCG/ACT Inhaler, Inhale 2 puffs into the lungs every 6 hours as needed for shortness of breath / dyspnea or wheezing, Disp: 1 Inhaler, Rfl: 3     ALBUTEROL IN, AS NEEDED, Disp: , Rfl:      ALPRAZolam (XANAX) 0.5 MG tablet, Take 1 tablet (0.5 mg) by mouth 3 times daily as needed for anxiety, Disp: 40 tablet, Rfl: 1     Ascorbic Acid (VITAMIN C PO), Take 1,000 mg by mouth daily., Disp: , Rfl:      buPROPion (WELLBUTRIN XL) 300 MG 24 hr tablet, Take 1 tablet (300 mg) by mouth every morning, Disp: 90 tablet, Rfl: 3     calcium carbonate-vitamin D 600-400 MG-UNIT CHEW, Take 1 chew tab by mouth daily, Disp: , Rfl:      Cholecalciferol (VITAMIN D3 PO), Take 2,000 Units by mouth daily., Disp: , Rfl:      citalopram (CELEXA) 40 MG tablet, Take 1.5 tablets (60 mg) by mouth daily, Disp: 135 tablet, Rfl: 3     fluticasone (FLONASE) 50 MCG/ACT nasal spray, Spray 1 spray into both nostrils daily, Disp: 1 Bottle, Rfl: 3     fluticasone (FLONASE) 50 MCG/ACT spray, Spray 2 sprays into both nostrils daily, Disp: 1 Bottle, Rfl: 3     fluticasone-salmeterol (ADVAIR) 500-50 MCG/DOSE diskus inhaler, Inhale 1 puff into the lungs every 12 hours., Disp: , Rfl:       ibuprofen (ADVIL,MOTRIN) 400 MG tablet, Take 1-2 tablets by mouth every 6 hours as needed (cramping)., Disp: 120 tablet, Rfl: 0     Lactobacillus (ACIDOPHILUS PO), Take 1 tablet by mouth daily., Disp: , Rfl:      LANsoprazole (PREVACID) 30 MG DR capsule, Take 1 capsule (30 mg) by mouth 2 times daily, Disp: 180 capsule, Rfl: 3     order for DME, Equipment being ordered: seasonal affective disorder UV lamp, Disp: 2 Device, Rfl: 0     Pseudoephedrine HCl (SUDAFED 12 HOUR PO), Take 1 tablet by mouth once., Disp: , Rfl:      spironolactone (ALDACTONE) 100 MG tablet, Take 1 tablet (100 mg) by mouth daily, Disp: 90 tablet, Rfl: 3    ALLERGIES:  Diflucan [fluconazole] and Augmentin    Social History     Socioeconomic History     Marital status:      Spouse name: None     Number of children: None     Years of education: None     Highest education level: None   Social Needs     Financial resource strain: None     Food insecurity - worry: None     Food insecurity - inability: None     Transportation needs - medical: None     Transportation needs - non-medical: None   Occupational History     None   Tobacco Use     Smoking status: Never Smoker     Smokeless tobacco: Never Used   Substance and Sexual Activity     Alcohol use: Yes     Comment: 3 drinks weekly- quit with pregnancy     Drug use: No     Sexual activity: Not Currently     Partners: Male     Birth control/protection: IUD   Other Topics Concern     Parent/sibling w/ CABG, MI or angioplasty before 65F 55M? Not Asked   Social History Narrative     None       Family History   Problem Relation Age of Onset     Depression Mother      Gastrointestinal Disease Mother      Allergies Father      Eye Disorder Father      Hypertension Father      Osteoporosis Paternal Grandmother      Hypertension Paternal Grandmother      Alzheimer Disease Paternal Grandfather      Asthma Brother      Depression Brother      Cardiovascular Maternal Grandfather         MI     Alcohol/Drug  "Maternal Grandmother        OBJECTIVE:  Vitals: BP 95/60 (BP Location: Right arm, Patient Position: Chair, Cuff Size: Adult Small)   Pulse 74   Temp 98.1  F (36.7  C) (Tympanic)   Resp 18   Ht 1.803 m (5' 11\")   Wt 71.7 kg (158 lb)   BMI 22.04 kg/m   BMI= Body mass index is 22.04 kg/m .   No LMP recorded. Patient is not currently having periods (Reason: IUD).     GENERAL APPEARANCE: healthy, alert and no distress    ASSESSMENT:      ICD-10-CM    1. Hypercholesterolemia E78.00    2. Major depressive disorder, recurrent episode, moderate (H) F33.1 buPROPion (WELLBUTRIN XL) 300 MG 24 hr tablet     citalopram (CELEXA) 40 MG tablet   3. Gastroesophageal reflux disease without esophagitis K21.9 LANsoprazole (PREVACID) 30 MG DR capsule   4. Acne vulgaris L70.0 spironolactone (ALDACTONE) 100 MG tablet   5. Chronic rhinitis J31.0 fluticasone (FLONASE) 50 MCG/ACT nasal spray   6. Need for prophylactic vaccination and inoculation against influenza Z23 FLU VACCINE, SPLIT VIRUS, IM (QUADRIVALENT) [20320]- >3 YRS     Vaccine Administration, Initial [07730]       PLAN:  Medications refilled per pt request  Reviewed FLP, interventions that can improve LDL such as low fat diet, fish oil and fiber in diet  Due for Pap 5/19  Yvonne Chavarria MD  [facility]    Duration of visit:  20 minutes, 100% in discussion of current issues, treatment options and treatment planning.  YVONNE Chavarria MD       "

## 2019-05-03 ENCOUNTER — HEALTH MAINTENANCE LETTER (OUTPATIENT)
Age: 39
End: 2019-05-03

## 2019-06-05 ENCOUNTER — OFFICE VISIT (OUTPATIENT)
Dept: OBGYN | Facility: CLINIC | Age: 39
End: 2019-06-05
Payer: COMMERCIAL

## 2019-06-05 VITALS
DIASTOLIC BLOOD PRESSURE: 56 MMHG | HEIGHT: 71 IN | RESPIRATION RATE: 16 BRPM | TEMPERATURE: 98.6 F | BODY MASS INDEX: 23.1 KG/M2 | SYSTOLIC BLOOD PRESSURE: 105 MMHG | WEIGHT: 165 LBS | HEART RATE: 76 BPM

## 2019-06-05 DIAGNOSIS — Z01.419 ENCOUNTER FOR ANNUAL ROUTINE GYNECOLOGICAL EXAMINATION: Primary | ICD-10-CM

## 2019-06-05 DIAGNOSIS — R87.613 HIGH GRADE SQUAMOUS INTRAEPITHELIAL LESION OF CERVIX: ICD-10-CM

## 2019-06-05 DIAGNOSIS — K64.9 HEMORRHOIDS, UNSPECIFIED HEMORRHOID TYPE: ICD-10-CM

## 2019-06-05 PROCEDURE — 88175 CYTOPATH C/V AUTO FLUID REDO: CPT | Performed by: OBSTETRICS & GYNECOLOGY

## 2019-06-05 PROCEDURE — 99395 PREV VISIT EST AGE 18-39: CPT | Performed by: OBSTETRICS & GYNECOLOGY

## 2019-06-05 PROCEDURE — 87624 HPV HI-RISK TYP POOLED RSLT: CPT | Performed by: OBSTETRICS & GYNECOLOGY

## 2019-06-05 ASSESSMENT — MIFFLIN-ST. JEOR: SCORE: 1524.57

## 2019-06-05 NOTE — PROGRESS NOTES
SUBJECTIVE:   CC: Era Garland is an 39 year old woman who presents for preventive health visit.     Healthy Habits:    Do you get at least three servings of calcium containing foods daily (dairy, green leafy vegetables, etc.)? yes    Amount of exercise or daily activities, outside of work: 4 day(s) per week    Problems taking medications regularly No    Medication side effects: No    Have you had an eye exam in the past two years? yes    Do you see a dentist twice per year? yes    Do you have sleep apnea, excessive snoring or daytime drowsiness?no          Today's PHQ-2 Score:   PHQ-2 ( 1999 [employer]) 6/5/2019 2/16/2018   Q1: Little interest or pleasure in doing things 0 1   Q2: Feeling down, depressed or hopeless 0 1   PHQ-2 Score 0 2       Abuse: Current or Past(Physical, Sexual or Emotional)- No  Do you feel safe in your environment? Yes    Social History     Tobacco Use     Smoking status: Never Smoker     Smokeless tobacco: Never Used   Substance Use Topics     Alcohol use: Yes     Comment: 3 drinks weekly- quit with pregnancy     If you drink alcohol do you typically have >3 drinks per day or >7 drinks per week? No                     Reviewed orders with patient.  Reviewed health maintenance and updated orders accordingly - Yes  Lab work is in process    Mammogram not appropriate for this patient based on age.    Pertinent mammograms are reviewed under the imaging tab.  History of abnormal Pap smear: YES - RAYMON 2/3 on biopsy - PAP/HPV co-testing at 12, 24 months.  If two negative results repeat co-testing in 3 years, if negative then routine screening.  PAP / HPV Latest Ref Rng & Units 2/16/2018 2/10/2015 8/15/2012   PAP - LSIL(A) NIL NIL   HPV 16 DNA NEG:Negative Negative - -   HPV 18 DNA NEG:Negative Negative - -   OTHER HR HPV NEG:Negative Positive(A) - -     Reviewed and updated as needed this visit by clinical staff  Tobacco  Allergies  Meds  Med Hx  Surg Hx  Fam Hx  Soc Hx        Reviewed and  updated as needed this visit by Provider        Past Medical History:   Diagnosis Date     Anxiety      Chickenpox      Depression      GERD (gastroesophageal reflux disease)      HPV (human papilloma virus) infection      IBS (irritable bowel syndrome)      Intermittent asthma      Papanicolaou smear of cervix with low grade squamous intraepithelial lesion (LGSIL) 2018     S/P LEEP of cervix age 24    path results?     Urinary tract infections       Past Surgical History:   Procedure Laterality Date     LAPAROSCOPIC APPENDECTOMY  2012    Procedure: LAPAROSCOPIC APPENDECTOMY;  Laparoscopic Appendectomy;  Surgeon: Jorge Luis Tan MD;  Location: [facility] OR     LEEP [place], CERVICAL  age 24     MOUTH SURGERY      wisdom teeth     OB History    Para Term  AB Living   3 3 3 0 0 3   SAB TAB Ectopic Multiple Live Births   0 0 0 0 3      # Outcome Date GA Lbr Oleg/2nd Weight Sex Delivery Anes PTL Lv   3 Term 13 37w2d 05:45 / 00:08 3.345 kg (7 lb 6 oz) F Vag-Spont EPI  CHAY      Birth Comments: Passed hearing  Passed pulse ox screening  GBS positive, treated  NBS normal      Name: CARROLL HUGHES EFRAIN      Apgar1: 8  Apgar5: 9   2 Term 08/21/10 37w0d 24:00 3.856 kg (8 lb 8 oz) F    CHAY   1 Term 08 39w0d 20:00 3.459 kg (7 lb 10 oz) M    CHAY       ROS:  CONSTITUTIONAL: NEGATIVE for fever, chills, change in weight  INTEGUMENTARU/SKIN: NEGATIVE for worrisome rashes, moles or lesions  EYES: NEGATIVE for vision changes or irritation  ENT: NEGATIVE for ear, mouth and throat problems  RESP: NEGATIVE for significant cough or SOB  BREAST: NEGATIVE for masses, tenderness or discharge  CV: NEGATIVE for chest pain, palpitations or peripheral edema  GI: NEGATIVE for nausea, abdominal pain, heartburn, or change in bowel habits. + hemorrhoids and constipation   : NEGATIVE for unusual urinary or vaginal symptoms. Periods are regular. Mild dyspaurenia with deep penetration.   MUSCULOSKELETAL: NEGATIVE  "for significant arthralgias or myalgia  NEURO: NEGATIVE for weakness, dizziness or paresthesias  PSYCHIATRIC: NEGATIVE for changes in mood or affect    OBJECTIVE:   /56 (BP Location: Right arm, Patient Position: Chair, Cuff Size: Adult Small)   Pulse 76   Temp 98.6  F (37  C) (Tympanic)   Resp 16   Ht 1.803 m (5' 11\")   Wt 74.8 kg (165 lb)   BMI 23.01 kg/m    EXAM:  GENERAL: healthy, alert and no distress  RESP:  Breathing comfortably on room air   BREAST: normal without masses, tenderness or nipple discharge and no palpable axillary masses or adenopathy  CV: regular rate, warm and well-perfused   ABDOMEN: soft, nontender, no hepatosplenomegaly, no masses  GYN: normal external genitalia, vagina and cervix, IUD strings visualized but tucked up in external cervical os, bimanual exam with small, anteverted uterus with no appreciable adnexal enlargement   MS: no gross musculoskeletal defects noted, no edema  SKIN: no suspicious lesions or rashes  NEURO: Normal strength and tone, mentation intact and speech normal  PSYCH: mentation appears normal, affect normal/bright    Diagnostic Test Results:  Labs reviewed in Epic    ASSESSMENT/PLAN:   1. High grade squamous intraepithelial lesion of cervix  - Pap imaged thin layer diagnostic with HPV (select HPV order below)  - HPV High Risk Types DNA Cervical    2. Hemorrhoids, unspecified hemorrhoid type  Discussed supportive measures, making sure she isn't constipated  - GENERAL SURG ADULT REFERRAL    COUNSELING:   Reviewed preventive health counseling, as reflected in patient instructions  Special attention given to:     Estimated body mass index is 23.01 kg/m  as calculated from the following:    Height as of this encounter: 1.803 m (5' 11\").    Weight as of this encounter: 74.8 kg (165 lb).         reports that she has never smoked. She has never used smokeless tobacco.      Counseling Resources:  ATP IV Guidelines  Pooled Cohorts Equation Calculator  Breast Cancer " Risk Calculator  FRAX Risk Assessment  ICSI Preventive Guidelines  Dietary Guidelines for Americans, 2010  USDA's MyPlate  ASA Prophylaxis  Lung CA Screening    Sabiha Chavarria MD  [facility]

## 2019-06-07 LAB
COPATH REPORT: NORMAL
PAP: NORMAL

## 2019-06-10 LAB
FINAL DIAGNOSIS: NORMAL
HPV HR 12 DNA CVX QL NAA+PROBE: NEGATIVE
HPV16 DNA SPEC QL NAA+PROBE: NEGATIVE
HPV18 DNA SPEC QL NAA+PROBE: NEGATIVE
SPECIMEN DESCRIPTION: NORMAL
SPECIMEN SOURCE CVX/VAG CYTO: NORMAL

## 2019-06-24 ENCOUNTER — TELEPHONE (OUTPATIENT)
Dept: OBGYN | Facility: CLINIC | Age: 39
End: 2019-06-24

## 2019-06-24 DIAGNOSIS — F33.1 MAJOR DEPRESSIVE DISORDER, RECURRENT EPISODE, MODERATE (H): ICD-10-CM

## 2019-06-24 RX ORDER — CITALOPRAM HYDROBROMIDE 40 MG/1
60 TABLET ORAL DAILY
Qty: 21 TABLET | Refills: 0 | Status: SHIPPED | OUTPATIENT
Start: 2019-06-24 | End: 2020-03-19

## 2019-06-24 NOTE — TELEPHONE ENCOUNTER
Reason for Call:  Other     Detailed comments: Pt is requesting temporary prescription for citalopram - she gets this filled on a 90-day schedule and says she lost her pills and cannot get them filled until next Monday - She is out of the medication. Asking for a 2-week prescription and pharmacy over-ride to get this refilled.     PHARMACY:  [facility]    Phone Number Patient can be reached at: Home number on file [phone] (home)    Best Time: Any    Can we leave a detailed message on this number? YES    Call taken on 6/24/2019 at 12:37 PM by Denise Behrendt

## 2019-11-04 ENCOUNTER — HEALTH MAINTENANCE LETTER (OUTPATIENT)
Age: 39
End: 2019-11-04

## 2019-12-04 NOTE — PROGRESS NOTES
Subjective     Era Garland is a 39 year old female who presents to clinic today for the following health issues:    HPI   Concern - Possible ADHD  Onset: years    Precipitating factors:   Worsened by: None    Alleviating factors:  Improved by: None    Therapies Tried and outcome: none                                                                                                      Some-                                                                        Never   Rarely      times       Often  Very Often  1. How often do you have trouble wrapping up the final details of a project,  once the challenging parts have been done?     x   2. How often do you have difficulty getting things in order when you have to do a task that requires organization?    x    3. How often do you have problems remembering appointments or obligations?    x    4. When you have a task that requires a lot of thought, how often do you avoid or delay getting started?     x   5. How often do you fidget or squirm with your hands or feet when you have to sit down for a long time?     x   6. How often do you feel overly active and compelled to do things, like you were driven by a motor?   x       Part A                                                        7. How often do you make careless mistakes when you have to work on a boring or difficult project?    x    8. How often do you have difficulty keeping your attention when you are doing boring or repetitive work?  x      9. How often do you have difficulty concentrating on what people say to you, even when they are speaking to you directly?   x     10. How often do you misplace or have difficulty finding things at home or at work?    x    11. How often are you distracted by activity or noise around you?     x   12. How often do you leave your seat in meetings or other situations in which you are expected to remain seated?    x      13. How often do you feel restless or fidgety?       x   14.  How often do you have difficulty unwinding and relaxing when you have time to yourself?     x   15. How often do you find yourself talking too much when you are in social situations?     x   16. When you're in a conversation, how often do you find yourself finishing the sentences of the people you are talking to, before they can finish them themselves?    x    17. How often do you have difficulty waiting your turn in situations when turn-taking is required?   x     18. How often do you interrupt others when they are busy?    x      Depression Followup    How are you doing with your depression since your last visit? No change    Are you having other symptoms that might be associated with depression: No    Have you had a significant life event?  No     Are you feeling anxious or having panic attacks?   No    Do you have any concerns with your use of alcohol or other drugs? No     This has been present her whole life, no specific change.  She is currently in the process of ADHD testing for her son, and as she was reading through the questionnaires she felt this was very applicable to her life as well!  She has difficulty not blurting her opinion out in social situations.   She has difficulty keeping track of her families schedule (for example, will sometimes forget daughter has girl scouts and will miss meetings).  She feels this is impacting her life and would like to look into this further with testing.      Has been on wellbutrin and Celexa for years (managed through her gynecologist).     Social History     Tobacco Use     Smoking status: Never Smoker     Smokeless tobacco: Never Used   Substance Use Topics     Alcohol use: Yes     Comment: 3 drinks weekly- quit with pregnancy     Drug use: No     PHQ 2/16/2018 5/4/2018 11/26/2019   PHQ-9 Total Score 10 5 8   Q9: Thoughts of better off dead/self-harm past 2 weeks Not at all Not at all Not at all     PENNY-7 SCORE 11/26/2019   Total Score 4 (minimal anxiety)   Total  Score 4     Last PHQ-9 11/26/2019   1.  Little interest or pleasure in doing things 1   2.  Feeling down, depressed, or hopeless 1   3.  Trouble falling or staying asleep, or sleeping too much 0   4.  Feeling tired or having little energy 2   5.  Poor appetite or overeating 1   6.  Feeling bad about yourself 1   7.  Trouble concentrating 2   8.  Moving slowly or restless 0   Q9: Thoughts of better off dead/self-harm past 2 weeks 0   PHQ-9 Total Score 8   Difficulty at work, home, or with people -     PENNY-7  11/26/2019   1. Feeling nervous, anxious, or on edge 1   2. Not being able to stop or control worrying 0   3. Worrying too much about different things 0   4. Trouble relaxing 1   5. Being so restless that it is hard to sit still 0   6. Becoming easily annoyed or irritable 2   7. Feeling afraid, as if something awful might happen 0   PENNY-7 Total Score 4         Suicide Assessment Five-step Evaluation and Treatment (SAFE-T)    Asthma Follow-Up    Was ACT completed today?    Yes    ACT Total Scores 12/9/2019   ACT TOTAL SCORE -   ASTHMA ER VISITS -   ASTHMA HOSPITALIZATIONS -   ACT TOTAL SCORE (Goal Greater than or Equal to 20) 21   In the past 12 months, how many times did you visit the emergency room for your asthma without being admitted to the hospital? 0   In the past 12 months, how many times were you hospitalized overnight because of your asthma? 0       How many days per week do you miss taking your asthma controller medication?  0    Please describe any recent triggers for your asthma: upper respiratory infections    Have you had any Emergency Room Visits, Urgent Care Visits, or Hospital Admissions since your last office visit?  Belkys Monterroso CMA    Patient Active Problem List   Diagnosis     Intermittent asthma     Lactose intolerance     Moderate major depression (H)     IUD (intrauterine device) in place     High grade squamous intraepithelial lesion of cervix     Past Surgical History:   Procedure  Laterality Date     LAPAROSCOPIC APPENDECTOMY  6/20/2012    Procedure: LAPAROSCOPIC APPENDECTOMY;  Laparoscopic Appendectomy;  Surgeon: Jorge Luis Tan MD;  Location: [facility] OR     [facility] [place], CERVICAL  age 24     MOUTH SURGERY      wisdom teeth       Social History     Tobacco Use     Smoking status: Never Smoker     Smokeless tobacco: Never Used   Substance Use Topics     Alcohol use: Yes     Comment: 3 drinks weekly- quit with pregnancy     Family History   Problem Relation Age of Onset     Depression Mother      Gastrointestinal Disease Mother      Allergies Father      Eye Disorder Father      Hypertension Father      Osteoporosis Paternal Grandmother      Hypertension Paternal Grandmother      Alzheimer Disease Paternal Grandfather      Asthma Brother      Depression Brother      Cardiovascular Maternal Grandfather         MI     Alcohol/Drug Maternal Grandmother          Current Outpatient Medications   Medication Sig Dispense Refill     albuterol (PROAIR HFA/PROVENTIL HFA/VENTOLIN HFA) 108 (90 Base) MCG/ACT Inhaler Inhale 2 puffs into the lungs every 6 hours as needed for shortness of breath / dyspnea or wheezing 1 Inhaler 3     ALBUTEROL IN AS NEEDED       ALPRAZolam (XANAX) 0.5 MG tablet Take 1 tablet (0.5 mg) by mouth 3 times daily as needed for anxiety 40 tablet 1     Ascorbic Acid (VITAMIN C PO) Take 1,000 mg by mouth daily.       buPROPion (WELLBUTRIN XL) 300 MG 24 hr tablet Take 1 tablet (300 mg) by mouth every morning 90 tablet 3     calcium carbonate-vitamin D 600-400 MG-UNIT CHEW Take 1 chew tab by mouth daily       Cholecalciferol (VITAMIN D3 PO) Take 2,000 Units by mouth daily.       citalopram (CELEXA) 40 MG tablet Take 1.5 tablets (60 mg) by mouth daily 21 tablet 0     fluticasone (FLONASE) 50 MCG/ACT nasal spray Spray 1 spray into both nostrils daily 1 Bottle 3     fluticasone (FLONASE) 50 MCG/ACT spray Spray 2 sprays into both nostrils daily 1 Bottle 3     fluticasone-salmeterol (ADVAIR)  "500-50 MCG/DOSE diskus inhaler Inhale 1 puff into the lungs every 12 hours.       ibuprofen (ADVIL,MOTRIN) 400 MG tablet Take 1-2 tablets by mouth every 6 hours as needed (cramping). 120 tablet 0     Lactobacillus (ACIDOPHILUS PO) Take 1 tablet by mouth daily.       LANsoprazole (PREVACID) 30 MG DR capsule Take 1 capsule (30 mg) by mouth 2 times daily 180 capsule 3     order for DME Equipment being ordered: seasonal affective disorder UV lamp 2 Device 0     Pseudoephedrine HCl (SUDAFED 12 HOUR PO) Take 1 tablet by mouth once.       spironolactone (ALDACTONE) 100 MG tablet Take 1 tablet (100 mg) by mouth daily 90 tablet 3     BP Readings from Last 3 Encounters:   12/09/19 108/62   06/05/19 105/56   12/21/18 95/60    Wt Readings from Last 3 Encounters:   12/09/19 73.9 kg (163 lb)   06/05/19 74.8 kg (165 lb)   12/21/18 71.7 kg (158 lb)                      Reviewed and updated as needed this visit by Provider  Tobacco  Allergies  Meds  Problems  Med Hx  Surg Hx  Fam Hx         Review of Systems   ROS COMP: Constitutional, HEENT, cardiovascular, pulmonary, gi and gu systems are negative, except as otherwise noted.      Objective    /62   Pulse 88   Temp 98  F (36.7  C) (Tympanic)   Resp 18   Ht 1.803 m (5' 11\")   Wt 73.9 kg (163 lb)   SpO2 98%   Breastfeeding No   BMI 22.73 kg/m    Body mass index is 22.73 kg/m .  Physical Exam   GENERAL: healthy, alert and no distress    Diagnostic Test Results:  none         Assessment & Plan     1. Difficulty concentrating  Further ADHD evaluation indicated.  Discussed process of assessment/testing and then she can follow back up with me after testing and we can discuss treatment plan (whether this is true ADHD or other mental health issue).   - MENTAL HEALTH REFERRAL  - Adult; Assessments and Testing; [place]; [facility] [place] [facility] [phone]; We will contact you to schedule the appointment or please call with any questions     Asthma- controlled. "       Return in about 4 weeks (around 1/6/2020) for after ADHD testing.    Babs Li PA-C  [facility]

## 2019-12-09 ENCOUNTER — OFFICE VISIT (OUTPATIENT)
Dept: OBGYN | Facility: CLINIC | Age: 39
End: 2019-12-09
Payer: COMMERCIAL

## 2019-12-09 ENCOUNTER — OFFICE VISIT (OUTPATIENT)
Dept: FAMILY MEDICINE | Facility: CLINIC | Age: 39
End: 2019-12-09
Payer: COMMERCIAL

## 2019-12-09 VITALS
RESPIRATION RATE: 18 BRPM | TEMPERATURE: 98 F | OXYGEN SATURATION: 98 % | HEIGHT: 71 IN | DIASTOLIC BLOOD PRESSURE: 62 MMHG | WEIGHT: 163 LBS | BODY MASS INDEX: 22.82 KG/M2 | HEART RATE: 88 BPM | SYSTOLIC BLOOD PRESSURE: 108 MMHG

## 2019-12-09 VITALS
RESPIRATION RATE: 18 BRPM | WEIGHT: 163 LBS | HEIGHT: 71 IN | DIASTOLIC BLOOD PRESSURE: 71 MMHG | TEMPERATURE: 97.1 F | BODY MASS INDEX: 22.82 KG/M2 | HEART RATE: 84 BPM | SYSTOLIC BLOOD PRESSURE: 103 MMHG

## 2019-12-09 DIAGNOSIS — R41.840 DIFFICULTY CONCENTRATING: Primary | ICD-10-CM

## 2019-12-09 DIAGNOSIS — B37.31 YEAST INFECTION OF THE VAGINA: Primary | ICD-10-CM

## 2019-12-09 DIAGNOSIS — J45.20 MILD INTERMITTENT ASTHMA WITHOUT COMPLICATION: ICD-10-CM

## 2019-12-09 PROCEDURE — 99213 OFFICE O/P EST LOW 20 MIN: CPT | Performed by: OBSTETRICS & GYNECOLOGY

## 2019-12-09 PROCEDURE — 99213 OFFICE O/P EST LOW 20 MIN: CPT | Performed by: PHYSICIAN ASSISTANT

## 2019-12-09 RX ORDER — TERCONAZOLE 8 MG/G
1 CREAM VAGINAL AT BEDTIME
Qty: 20 G | Refills: 1 | Status: SHIPPED | OUTPATIENT
Start: 2019-12-09 | End: 2020-05-11

## 2019-12-09 RX ORDER — LIDOCAINE 50 MG/G
OINTMENT TOPICAL 3 TIMES DAILY
Qty: 5 G | Refills: 1 | Status: SHIPPED | OUTPATIENT
Start: 2019-12-09 | End: 2020-05-11

## 2019-12-09 ASSESSMENT — MIFFLIN-ST. JEOR
SCORE: 1515.49
SCORE: 1515.49

## 2019-12-09 NOTE — NURSING NOTE
"Initial /71 (BP Location: Right arm, Patient Position: Chair, Cuff Size: Adult Large)   Pulse 84   Temp 97.1  F (36.2  C) (Tympanic)   Resp 18   Ht 1.803 m (5' 11\")   Wt 73.9 kg (163 lb)   BMI 22.73 kg/m   Estimated body mass index is 22.73 kg/m  as calculated from the following:    Height as of this encounter: 1.803 m (5' 11\").    Weight as of this encounter: 73.9 kg (163 lb). .      "

## 2019-12-09 NOTE — PROGRESS NOTES
Era is a 39 year old   female who presents for evaluation of external genital irritation; she states the day after IC, got diffuse external genital itching; self treated with po Acidophilis and po Diflucan, but no improvement; saw UC with neg wet mount but given empiric Flagyl po therapy; still itchy with possible focal sore near clitoris..    Patient Active Problem List    Diagnosis Date Noted     High grade squamous intraepithelial lesion of cervix 2018     Priority: Medium     LEEP at age 24 - not sure of results  18 Pap: LSIL, +HR HPV (neg /18). Plan [place] due by 18: Colpo biopsy c/w low and high grade changes--recommend LEEP  18: LEEP: mostly LGSIL, small focus HSIL--Pap and HPV 1 year.   19 NIL Pap, Neg HPV. Plan cotest in 1 year.        IUD (intrauterine device) in place 02/10/2015     Priority: Medium     Mirena IUD placed 2/10/2015  String cut short during LEEP procedure         Moderate major depression (H) 2013     Priority: Medium     Has been on Celexa 40mg in past; changed to Zoloft without good control in pregnancy so changed back to Celexa  In counseling as well both solo and marital  Added Remeron 15mg as depression not well controlled; felt worse on Remeron, so changed to Cymbalta  Back to Celexa 40mg due to insurance reasons   Added Wellbutrin 2/15       Lactose intolerance 2012     Priority: Medium     Intermittent asthma      Priority: Medium       All systems were reviewed and pertinent information in noted in subjective/HPI.    Past Medical History:   Diagnosis Date     Anxiety      Chickenpox      Depression      GERD (gastroesophageal reflux disease)      HPV (human papilloma virus) infection      IBS (irritable bowel syndrome)      Intermittent asthma      Papanicolaou smear of cervix with low grade squamous intraepithelial lesion (LGSIL) 2018     S/P LEEP of cervix age 24    path results?     Urinary tract infections         Past Surgical History:   Procedure Laterality Date     LAPAROSCOPIC APPENDECTOMY  6/20/2012    Procedure: LAPAROSCOPIC APPENDECTOMY;  Laparoscopic Appendectomy;  Surgeon: Jorge Luis Tan MD;  Location: [facility] [place], CERVICAL  age 24     MOUTH SURGERY      wisdom teeth         Current Outpatient Medications:      albuterol (PROAIR HFA/PROVENTIL HFA/VENTOLIN HFA) 108 (90 Base) MCG/ACT Inhaler, Inhale 2 puffs into the lungs every 6 hours as needed for shortness of breath / dyspnea or wheezing, Disp: 1 Inhaler, Rfl: 3     ALBUTEROL IN, AS NEEDED, Disp: , Rfl:      ALPRAZolam (XANAX) 0.5 MG tablet, Take 1 tablet (0.5 mg) by mouth 3 times daily as needed for anxiety, Disp: 40 tablet, Rfl: 1     Ascorbic Acid (VITAMIN C PO), Take 1,000 mg by mouth daily., Disp: , Rfl:      buPROPion (WELLBUTRIN XL) 300 MG 24 hr tablet, Take 1 tablet (300 mg) by mouth every morning, Disp: 90 tablet, Rfl: 3     calcium carbonate-vitamin D 600-400 MG-UNIT CHEW, Take 1 chew tab by mouth daily, Disp: , Rfl:      Cholecalciferol (VITAMIN D3 PO), Take 2,000 Units by mouth daily., Disp: , Rfl:      citalopram (CELEXA) 40 MG tablet, Take 1.5 tablets (60 mg) by mouth daily, Disp: 21 tablet, Rfl: 0     fluticasone (FLONASE) 50 MCG/ACT nasal spray, Spray 1 spray into both nostrils daily, Disp: 1 Bottle, Rfl: 3     fluticasone-salmeterol (ADVAIR) 500-50 MCG/DOSE diskus inhaler, Inhale 1 puff into the lungs every 12 hours., Disp: , Rfl:      ibuprofen (ADVIL,MOTRIN) 400 MG tablet, Take 1-2 tablets by mouth every 6 hours as needed (cramping)., Disp: 120 tablet, Rfl: 0     Lactobacillus (ACIDOPHILUS PO), Take 1 tablet by mouth daily., Disp: , Rfl:      LANsoprazole (PREVACID) 30 MG DR capsule, Take 1 capsule (30 mg) by mouth 2 times daily, Disp: 180 capsule, Rfl: 3     lidocaine (XYLOCAINE) 5 % external ointment, Apply topically 3 times daily, Disp: 5 g, Rfl: 1     order for DME, Equipment being ordered: seasonal affective disorder UV  lamp, Disp: 2 Device, Rfl: 0     Pseudoephedrine HCl (SUDAFED 12 HOUR PO), Take 1 tablet by mouth once., Disp: , Rfl:      spironolactone (ALDACTONE) 100 MG tablet, Take 1 tablet (100 mg) by mouth daily, Disp: 90 tablet, Rfl: 3     terconazole (TERAZOL 3) 0.8 % vaginal cream, Place 1 applicator (5 g) vaginally At Bedtime, Disp: 20 g, Rfl: 1     fluticasone (FLONASE) 50 MCG/ACT spray, Spray 2 sprays into both nostrils daily (Patient not taking: Reported on 12/9/2019), Disp: 1 Bottle, Rfl: 3    ALLERGIES:  Augmentin    Social History     Socioeconomic History     Marital status:      Spouse name: None     Number of children: None     Years of education: None     Highest education level: None   Occupational History     None   Social Needs     Financial resource strain: None     Food insecurity:     Worry: None     Inability: None     Transportation needs:     Medical: None     Non-medical: None   Tobacco Use     Smoking status: Never Smoker     Smokeless tobacco: Never Used   Substance and Sexual Activity     Alcohol use: Yes     Comment: 3 drinks weekly- quit with pregnancy     Drug use: No     Sexual activity: Not Currently     Partners: Male     Birth control/protection: I.U.D.   Lifestyle     Physical activity:     Days per week: None     Minutes per session: None     Stress: None   Relationships     Social connections:     Talks on phone: None     Gets together: None     Attends [employer] service: None     Active member of club or organization: None     Attends meetings of clubs or organizations: None     Relationship status: None     Intimate partner violence:     Fear of current or ex partner: None     Emotionally abused: None     Physically abused: None     Forced sexual activity: None   Other Topics Concern     Parent/sibling w/ CABG, MI or angioplasty before 65F 55M? Not Asked   Social History Narrative     None       Family History   Problem Relation Age of Onset     Depression Mother       "Gastrointestinal Disease Mother      Allergies Father      Eye Disorder Father      Hypertension Father      Osteoporosis Paternal Grandmother      Hypertension Paternal Grandmother      Alzheimer Disease Paternal Grandfather      Asthma Brother      Depression Brother      Cardiovascular Maternal Grandfather         MI     Alcohol/Drug Maternal Grandmother        OBJECTIVE:  Vitals: /71 (BP Location: Right arm, Patient Position: Chair, Cuff Size: Adult Large)   Pulse 84   Temp 97.1  F (36.2  C) (Tympanic)   Resp 18   Ht 1.803 m (5' 11\")   Wt 73.9 kg (163 lb)   BMI 22.73 kg/m   BMI= Body mass index is 22.73 kg/m .   No LMP recorded. (Menstrual status: IUD).     GENERAL APPEARANCE: healthy, alert and no distress  PELVIC:  EGBUS:  Erythema of both labia minora with focal intense erythema without blister or ulcer above the clitoris; PCR test taken  VAGINA:  Abundant flocculent white discharge c/w yeast    ASSESSMENT:      ICD-10-CM    1. Yeast infection of the vagina B37.3 HSV 1 and 2 DNA by PCR     terconazole (TERAZOL 3) 0.8 % vaginal cream     lidocaine (XYLOCAINE) 5 % external ointment       PLAN:  I recommend addressing as yeast with Terazole 3 cream and topical Xylocaine ointment  Check HSV 1 and 2 PCR test    Sabiha Chavarria MD    "

## 2019-12-10 LAB
HSV1 DNA SPEC QL NAA+PROBE: NEGATIVE
HSV2 DNA SPEC QL NAA+PROBE: NEGATIVE
SPECIMEN SOURCE: NORMAL

## 2019-12-10 ASSESSMENT — ASTHMA QUESTIONNAIRES: ACT_TOTALSCORE: 21

## 2020-02-13 ENCOUNTER — OFFICE VISIT (OUTPATIENT)
Dept: PSYCHOLOGY | Facility: CLINIC | Age: 40
End: 2020-02-13
Attending: PHYSICIAN ASSISTANT
Payer: COMMERCIAL

## 2020-02-13 DIAGNOSIS — F90.2 ADHD (ATTENTION DEFICIT HYPERACTIVITY DISORDER), COMBINED TYPE: Primary | ICD-10-CM

## 2020-02-13 PROCEDURE — 90834 PSYTX W PT 45 MINUTES: CPT | Performed by: PSYCHOLOGIST
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
## 2020-02-20 ENCOUNTER — OFFICE VISIT (OUTPATIENT)
Dept: PSYCHOLOGY | Facility: CLINIC | Age: 40
End: 2020-02-20
Attending: PHYSICIAN ASSISTANT
Payer: COMMERCIAL

## 2020-02-20 DIAGNOSIS — F90.2 ADHD (ATTENTION DEFICIT HYPERACTIVITY DISORDER), COMBINED TYPE: Primary | ICD-10-CM

## 2020-02-20 PROCEDURE — 90791 PSYCH DIAGNOSTIC EVALUATION: CPT | Performed by: PSYCHOLOGIST
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
## 2020-03-17 ENCOUNTER — OFFICE VISIT (OUTPATIENT)
Dept: PSYCHOLOGY | Facility: CLINIC | Age: 40
End: 2020-03-17
Payer: COMMERCIAL

## 2020-03-17 DIAGNOSIS — F90.2 ADHD (ATTENTION DEFICIT HYPERACTIVITY DISORDER), COMBINED TYPE: Primary | ICD-10-CM

## 2020-03-17 PROCEDURE — 90834 PSYTX W PT 45 MINUTES: CPT | Mod: TEL | Performed by: PSYCHOLOGIST

## 2020-03-18 ENCOUNTER — FCC EXTENDED DOCUMENTATION (OUTPATIENT)
Dept: PSYCHOLOGY | Facility: CLINIC | Age: 40
End: 2020-03-18

## 2020-03-18 DIAGNOSIS — F90.2 ADHD (ATTENTION DEFICIT HYPERACTIVITY DISORDER), COMBINED TYPE: Primary | ICD-10-CM

## 2020-03-18 NOTE — PROGRESS NOTES
Progress Note  Disclaimer: Voice recognition software was used to generate this note. As a result, wrong word or 'sound-a-like' substitutions may have occurred due to the inherent limitations of voice recognition software. There may be errors in the script that have gone undetected. Please consider this when interpreting information found in this chart.       Client Name: Era Garland Date: 3/17/2020         Service Type: Individual      Session Start Time: 6:00 PM  session End Time: 6:45 PM      Session Length: 45     Session #: 3   Attendees: Visit conducted by telephone due to pandemic situation.         DATA   ADHD Assessment feedback session. Reviewed results of testing. See [facility] extended documentation for results. Explained diagnosis and treatment options. Recommended medication evaluation be scheduled with PCP. Also provided and reviewed ADHD patient handout. Pt will schedule follow-up with me after seeing PCP.     Progress Since Last Session (Related to Symptoms / Goals / Homework):   Symptoms: No change Stable    Homework: Achieved / completed to satisfaction      Episode of Care Goals: Satisfactory progress - PREPARATION (Decided to change - considering how); Intervened by negotiating a change plan and determining options / strategies for behavior change, identifying triggers, exploring social supports, and working towards setting a date to begin behavior change     Current / Ongoing Stressors and Concerns:   Difficulty with attention and concentration     Treatment Objective(s) Addressed in This Session:   Reviewed results of testing, provided ADHD Psychoeducation tips and resources, encouraged client to make appointment for medication evaluation.       Intervention:   psychoeducation regarding ADHD        ASSESSMENT: Current Emotional / Mental Status (status of significant symptoms):   Risk status (Self / Other harm or suicidal ideation)   Client denies current  fears or concerns for personal safety.   Client denies current or recent suicidal ideation or behaviors.   Client denies current or recent homicidal ideation or behaviors.   Client denies current or recent self injurious behavior or ideation.   Client denies other safety concerns.   Recommended that patient call 911 or go to the local ED should there be a change in any of these risk factors.     Appearance:   Could not be evaluated due to nature phone visit   Eye Contact:   Could not be evaluated   Psychomotor Behavior:   Could not be evaluated    Attitude:   Cooperative    Orientation:   All   Speech    Rate / Production: Normal     Volume:  Normal    Mood:    Normal   Affect:    Appropriate    Thought Content:  Clear    Thought Form:  Coherent  Logical    Insight:    Good      Medication Review:   No changes to current psychiatric medication(s)     Medication Compliance:   Yes     Changes in Health Issues:   None reported     Chemical Use Review:   Substance Use: Chemical use reviewed, no active concerns identified      Tobacco Use: No current tobacco use.       Collateral Reports Completed:   Not Applicable    PLAN: (Client Tasks / Therapist Tasks / Other)  See [facility] extended documentation for testing report.        Mendoza Guo

## 2020-03-18 NOTE — PROGRESS NOTES
[facility]  ADHD Evaluation    This note consists of symbols derived from keyboarding, dictation and/or voice recognition software. As a result, there may be errors in the script that have gone undetected. Please consider this when interpreting information found in this chart.    Patient: Era Garland  YOB: 1980  MRN: [id number]  Date(s) of assessment:  Diagnostic Assessment 2/13/2020 and 2/20/2020, Paulo self-report and collateral measures scored and interpreted 3/17/2020 and Feedback session done via phone 3/17/2020    Information about appointment:  Client attended three sessions to aid in determining client's mental health diagnosis or diagnoses and treatment recommendations that best address client concerns. Client records includingmedical were reviewed. A diagnostic assessment was conducted at the initial appointment. Client completed several rating scales to assist in assessing attention-related and other mental health symptoms that may be causing impairments in functioning. Rating scales were also completed by a collateral contact.    Assessment tools:      Paulo Adult ADHD Rating Scale-IV: Self and Other Reports (BAARS-IV), Paulo Functional Impairment Scale: Self and Other Reports (BFIS), Paulo Deficits in Executive Functioning Scale: Self and Other Reports (BDEFS), Patient Health Questionnaire-9 (PHQ-9) and Generalized Anxiety Disorder-7 (PENNY-7)    Assessment Results:    Behavioral Observations:  During our office sessions the client evidenced the following behaviors: hard to interrupt and distant/unfocused.    Paulo Adult ADHD Rating Scale-IV: Self and Other Reports (BAARS-IV)  The BAARS-IV assesses for symptoms of ADHD that are experienced in one's daily life. This assessment measure includes self and collateral rating scales designed to provide information regarding current and childhood symptoms of ADHD including inattention, hyperactivity, and impulsivity.  "Self-report scores are reported as percentiles. Scores at the 76th-83rd percentile are considered marginal, scores at the 84th-92nd percentile are considered borderline, scores at the 93rd-95th percentile are considered mild, scores at the 96th-98th percentile are considered moderate, and those at the 99th percentile are considered severe. Collateral or \"other\" rating scales are reported as number of symptoms observed in comparison to those reported by the client. Norms and percentile scores are not available for collateral reports.     Current Symptoms Scale--Self Report:   Client completed the self-report inventory of current symptoms. The results indicate that the client's Total ADHD Score was 65.  Which places her in the 99th percentile for overall ADHD symptoms. In addition, the client endorsed 8/9 (98th percentile) Inattention symptoms, 9/9(99th percentile) Hyperactivity/Impulsivity symptoms, and 1/9 (78th percentile) Sluggish Cognitive Tempo symptoms. Client indicated that the reported symptoms have resulted in impaired functioning in school, home, work and social relationships. Overall, the results suggest the client is experiencing Severe ADHD symptoms.     Current Symptoms Scale--Other Report:  Client's spouse completed the collateral report inventory of current symptoms. Based on the collateral contact's observation of symptoms, the client demonstrates 7/9 Inattention symptoms, 3/5 Hyperactivity symptoms, 4/4 Impulsivity symptoms, and 4/9 Sluggish Cognitive Tempo symptoms. The client's Total ADHD Score was 57. The collateral contact indicated the client demonstrates impaired functioning in home, work and social relationships} The collateral- and self-report scores are not significantly different.     Childhood Symptoms Scale--Self-Report:  Client completed the self-report inventory of childhood symptoms. The results indicate that the client's Total ADHD Score was 46 which places her in the 93rd percentile " "for overall ADHD symptoms in childhood. In addition, the client endorsed 1/9 (79th percentile) Inattention symptoms and 8/9 (98th percentile) Hyperactivity-Impulsivity symptoms. Client indicated that the reported symptoms resulted in impaired functioning in school and social relationships Overall, the results suggest the client experienced  Borderline symptoms of ADHD as a child.     Childhood Symptoms Scale--Other Report:  Client's mother completed the collateral report inventory of childhood symptoms. Based on the collateral contact's recollection of client's childhood symptoms, the client demonstrated 0/9 Inattention symptoms and 7/9 Hyperactivity-Impulsivity symptoms. The client's Total ADHD Score was 38. The collateral contact indicated the client demonstrates impaired functioning in school and social relationshipsThe collateral- and self-report scores are not significantly different.                           Paulo Functional Impairment Scale: Self and Other Reports (BFIS)  The BFIS is used to assess an individuals' psychosocial impairment in major life/daily activities that may be due to a mental health disorder. This assessment measure includes self and collateral rating scales. Self-report scores are reported as percentiles. Scores at the 76th-83rd percentile are considered marginal, scores at the 84th-92nd percentile are considered borderline, scores at the 93rd-95th percentile are considered mild, scores at the 96th-98th percentile are considered moderate, and those at the 99th percentile are considered severe.Collateral or \"other\" rating scales are reported as number of symptoms observed in comparison to those reported by the client. Norms and percentile scores are not available for collateral reports.     Results indicate the client identified impairment (scores at or greater than 93rd percentile) in the following areas: home-family, home-chores, work, social-strangers, social-friends, " "marriage/cohabiting/dating, driving, daily responsibilities and childrearing The client's Mean Impairment Score was 6.64 (97th percentile percentile) indicating the client is reporting Moderate impairment in functioning across domains. Client's spouse completed the collateral rating scale, which indicated similar results. The collateral contact's scores were generally lower than the client's report.     Paulo Deficits in Executive Functioning Scale (BDEFS)  The BDEFS is a measure used for evaluating dimensions of adult executive functioning in daily life.This assessment measure includes self and collateral rating scales. Self-report scores are reported as percentiles. Scores at the 76th-83rd percentile are considered marginal, scores at the 84th-92nd percentile are considered borderline, scores at the 93rd-95th percentile are considered mild, scores at the 96th-98th percentile are considered moderate, and those at the 99th percentile are considered severe.Collateral or \"other\" rating scales are reported as number of symptoms observed in comparison to those reported by the client. Norms and percentile scores are not available for collateral reports.     Results indicate the client's Total Executive Functioning Score was 260 (99th percentile). The ADHD-Executive Functioning Index score was 33 (99th percentile). These scores suggest the client has Severe deficits in executive functioning. These deficits are likely to be due to ADHD. Results indicate the client identified significant deficits in the following areas: self-management to time 98th percentile , self-organization/problem-solving 96th percentile,  self-restraint 98th percentile, self-motivation 96% and self-regulation of emotions 97%. Client's spouse completed the collateral rating scale, which indicated similar results. The collateral contact's scores were generally lower than the client's report.    Generalized Anxiety Disorder Questionnaire (PENNY-7)  This " questionnaire is designed to assess for anxiety in adults.  Based on the score, she is experiencing mild symptoms of anxiety. Client identified the following symptoms of anxiety: feeling on edge/nervous/anxious, being restless and becoming easily annoyed or irritable    Patient Health Questionnaire- 9 (PHQ-9)   This questionnaire is designed to assess for depression in adults.  Based on the score, she is experiencing moderate symptoms of depression. Client identified the following symptoms of depression: depressed mood, lack of interest, difficulty with sleep, poor appetite or overeating, feeling bad about self and poor concentration.         Summary (based on clinical interview, review of records, test results):  ADHD is a neurodevelopmental disorder. As such, to qualify for a diagnosis of ADHD an individual must show some symptoms of the disorder before the age of 12; these symptoms must currently be present to a degree that is inconsistent with their developmental level; the symptoms must negatively impact the individual;  they must be present for more than six months;and  they must occur in multiple areas of the individuals life (e.g. Home and school).     Testing and class history history both point towards a moderate to severe case of combined ADHD, though in childhood he was primarily hyperactive.  There are associated deficits and executive functioning that all reached a high moderate to severe level.  There is mild to moderate depression and mild anxiety present which is typical of individuals with a history of undiagnosed ADHD.    DSM5 Diagnoses: (Sustained by DSM5 Criteria Listed Above)  Diagnoses: Attention-Deficit/Hyperactivity Disorder  314.01 (F90.2) Combined presentation; 296.31 (F33.0) Major Depressive Disorder, Recurrent Episode, Mild _ and With anxious distress;    Psychosocial & Contextual Factors: Several of her children also diagnosed with ADHD, all marital stressors made worse by impulsive  behavior  WHODAS 2.0 (12 item) Raw Score: See EMR      Recommendations:    Schedule an appointment with your physician to discuss a medication evaluation., Access resources through websites, books, and articles such as those provided  in the handout., Consider working with an ADHD  or individual therapist to learn skills to  assist with symptom management, as well as ways to improve relationships,  etc that may have been impacted by your symptoms. , Consider attending workshops or support groups through [employer] ([employer]). and Schedule a follow-up appointment with me in about six weeks to review symptoms, treatment involvement, and struggles and/or successes.    Mendoza Guo

## 2020-03-19 ENCOUNTER — MYC MEDICAL ADVICE (OUTPATIENT)
Dept: DERMATOLOGY | Facility: CLINIC | Age: 40
End: 2020-03-19

## 2020-03-19 DIAGNOSIS — F33.1 MAJOR DEPRESSIVE DISORDER, RECURRENT EPISODE, MODERATE (H): ICD-10-CM

## 2020-03-19 DIAGNOSIS — L70.0 ACNE VULGARIS: ICD-10-CM

## 2020-03-19 DIAGNOSIS — K21.9 GASTROESOPHAGEAL REFLUX DISEASE WITHOUT ESOPHAGITIS: ICD-10-CM

## 2020-03-19 DIAGNOSIS — J31.0 CHRONIC RHINITIS: ICD-10-CM

## 2020-03-19 RX ORDER — LANSOPRAZOLE 30 MG/1
30 CAPSULE, DELAYED RELEASE ORAL 2 TIMES DAILY
Qty: 180 CAPSULE | Refills: 1 | Status: SHIPPED | OUTPATIENT
Start: 2020-03-19 | End: 2020-07-06

## 2020-03-19 RX ORDER — SPIRONOLACTONE 100 MG/1
100 TABLET, FILM COATED ORAL DAILY
Qty: 90 TABLET | Refills: 0 | Status: SHIPPED | OUTPATIENT
Start: 2020-03-19 | End: 2020-07-06

## 2020-03-19 RX ORDER — FLUTICASONE PROPIONATE 50 MCG
1 SPRAY, SUSPENSION (ML) NASAL DAILY
Qty: 48 G | Refills: 1 | Status: SHIPPED | OUTPATIENT
Start: 2020-03-19 | End: 2020-11-25

## 2020-03-19 RX ORDER — CITALOPRAM HYDROBROMIDE 40 MG/1
60 TABLET ORAL DAILY
Qty: 135 TABLET | Refills: 0 | Status: SHIPPED | OUTPATIENT
Start: 2020-03-19 | End: 2020-07-06

## 2020-03-19 ASSESSMENT — ANXIETY QUESTIONNAIRES
6. BECOMING EASILY ANNOYED OR IRRITABLE: SEVERAL DAYS
4. TROUBLE RELAXING: SEVERAL DAYS
7. FEELING AFRAID AS IF SOMETHING AWFUL MIGHT HAPPEN: NOT AT ALL
GAD7 TOTAL SCORE: 3
GAD7 TOTAL SCORE: 3
5. BEING SO RESTLESS THAT IT IS HARD TO SIT STILL: NOT AT ALL
GAD7 TOTAL SCORE: 3
7. FEELING AFRAID AS IF SOMETHING AWFUL MIGHT HAPPEN: NOT AT ALL
2. NOT BEING ABLE TO STOP OR CONTROL WORRYING: NOT AT ALL
1. FEELING NERVOUS, ANXIOUS, OR ON EDGE: SEVERAL DAYS
3. WORRYING TOO MUCH ABOUT DIFFERENT THINGS: NOT AT ALL

## 2020-03-19 ASSESSMENT — PATIENT HEALTH QUESTIONNAIRE - PHQ9
10. IF YOU CHECKED OFF ANY PROBLEMS, HOW DIFFICULT HAVE THESE PROBLEMS MADE IT FOR YOU TO DO YOUR WORK, TAKE CARE OF THINGS AT HOME, OR GET ALONG WITH OTHER PEOPLE: SOMEWHAT DIFFICULT
SUM OF ALL RESPONSES TO PHQ QUESTIONS 1-9: 6
SUM OF ALL RESPONSES TO PHQ QUESTIONS 1-9: 6

## 2020-03-19 NOTE — TELEPHONE ENCOUNTER
Last PHQ-9 score was: 6 today.  PENNY-7 score was: 3.     RN cannot authorize refill per protocol if PHQ-9 score is 4 or more. To Provider for approval.     Also due for Potassium level while on Aldactone.     Can you address Patient's my chart question about IUD being in place over 5 years?      Marah Chapman RN

## 2020-03-19 NOTE — TELEPHONE ENCOUNTER
Tim Bergman MD  You Just now (3:46 PM)       The IUD, when they used for cycle control, could stay on longer than the 5-year indicated.   I would not feel comfortable staying on it if sexually active.   We can go ahead and order her potassium level.   I see that Sabiha english is her primary.  I think she would need to be seen by someone before starting medications for ADHD     Tim Bergman MD    Routing comment

## 2020-03-19 NOTE — TELEPHONE ENCOUNTER
Due to have labs drawn as no current Potassium level in chart and on Aldactone, so needs level drawn every 6 months per Refill medication protocol.    I sent a PHQ-9 and PENNY=7 questionnaire via My chart for patient to update.    Marah Chapman RN

## 2020-03-19 NOTE — TELEPHONE ENCOUNTER
Last PHQ-9 score was: 6 today.  PENNY-7 score was: 3.     RN cannot authorize refill per protocol if PHQ-9 score is 4 or more. To Provider for approval.     Also due for Potassium level while on Aldactone.     Please sign for 90 day refills as PHQ-9 higher than RN refill protocol lornas RN to refill.     Marah Chapman RN

## 2020-03-19 NOTE — TELEPHONE ENCOUNTER
Refill request received for prevacid also. Refilled to get patient through until annual appointment can be done    Mandy PINZON RN   [facility]

## 2020-03-20 ASSESSMENT — PATIENT HEALTH QUESTIONNAIRE - PHQ9: SUM OF ALL RESPONSES TO PHQ QUESTIONS 1-9: 6

## 2020-03-20 ASSESSMENT — ANXIETY QUESTIONNAIRES: GAD7 TOTAL SCORE: 3

## 2020-03-23 ENCOUNTER — MYC REFILL (OUTPATIENT)
Dept: OBGYN | Facility: CLINIC | Age: 40
End: 2020-03-23

## 2020-03-23 ENCOUNTER — E-VISIT (OUTPATIENT)
Dept: FAMILY MEDICINE | Facility: CLINIC | Age: 40
End: 2020-03-23
Payer: COMMERCIAL

## 2020-03-23 DIAGNOSIS — F33.1 MAJOR DEPRESSIVE DISORDER, RECURRENT EPISODE, MODERATE (H): ICD-10-CM

## 2020-03-23 DIAGNOSIS — R41.840 LACK OF CONCENTRATION: Primary | ICD-10-CM

## 2020-03-23 PROCEDURE — 99207 ZZC NON-BILLABLE SERV PER CHARTING: CPT | Performed by: PHYSICIAN ASSISTANT

## 2020-03-24 NOTE — TELEPHONE ENCOUNTER
Provider E-Visit time total (minutes): 0  Requested patient to make a phone appointment as provider has never seen her about this issue before. Adriana Salcido M.D.

## 2020-03-26 RX ORDER — BUPROPION HYDROCHLORIDE 300 MG/1
300 TABLET ORAL EVERY MORNING
Qty: 30 TABLET | Refills: 0 | Status: SHIPPED | OUTPATIENT
Start: 2020-03-26 | End: 2020-11-25

## 2020-03-26 RX ORDER — BUPROPION HYDROCHLORIDE 300 MG/1
300 TABLET ORAL EVERY MORNING
Qty: 90 TABLET | Refills: 2 | Status: SHIPPED | OUTPATIENT
Start: 2020-03-26 | End: 2020-03-26

## 2020-03-26 NOTE — TELEPHONE ENCOUNTER
Last PHQ-9 score was: 6  RN cannot authorize refill per protocol if PHQ-9 score is 4 or more. To Provider for approval. Marah Chapman RN    PHQ-9 ([employer]) 3/19/2020   No Interest In Doing Things    Feeling Depressed    Trouble Sleeping    Tired / No Energy    No appetite or Over-Eating    Feeling Bad about Self    Trouble Concentrating    Moving Slow or Restless    Suicidal Thoughts    Total Score    1.  Little interest or pleasure in doing things 1   2.  Feeling down, depressed, or hopeless 1   3.  Trouble falling or staying asleep, or sleeping too much 1   4.  Feeling tired or having little energy 1   5.  Poor appetite or overeating 0   6.  Feeling bad about yourself 0   7.  Trouble concentrating 2   8.  Moving slowly or restless 0   9.  Suicidal or self-harm thoughts 0   PHQ-9 Total Score 6   Difficulty at work, home, or with people    1.  Little interest or pleasure in doing things Several days   2.  Feeling down, depressed, or hopeless Several days   3.  Trouble falling or staying asleep, or sleeping too much Several days   4.  Feeling tired or having little energy Several days   5.  Poor appetite or overeating Not at all   6.  Feeling bad about yourself Not at all   7.  Trouble concentrating More than half the days   8.  Moving slowly or restless Not at all   9.  Suicidal or self-harm thoughts Not at all   [facility] TOTAL [facility]-----> 6 (Mild depression)   Difficulty at work, home, or with people Somewhat difficult

## 2020-03-27 NOTE — TELEPHONE ENCOUNTER
Pharmacy sent message indicating that 90 day supply is needed on the prescription for the insurance to cover. Unsure if 90 day supply is sent to local pharmacy if insurance will also cover mail order 90 day supply.    Left message for patient to call back to clinic.    Patient may check with pharmacy to see if she can  and pay for small portion out of pocket there.     Patricia Mtaos   [facility]  RN

## 2020-04-15 ENCOUNTER — MYC MEDICAL ADVICE (OUTPATIENT)
Dept: FAMILY MEDICINE | Facility: CLINIC | Age: 40
End: 2020-04-15

## 2020-04-15 ENCOUNTER — VIRTUAL VISIT (OUTPATIENT)
Dept: FAMILY MEDICINE | Facility: CLINIC | Age: 40
End: 2020-04-15
Payer: COMMERCIAL

## 2020-04-15 DIAGNOSIS — F90.2 ATTENTION DEFICIT HYPERACTIVITY DISORDER (ADHD), COMBINED TYPE: Primary | ICD-10-CM

## 2020-04-15 PROCEDURE — 99214 OFFICE O/P EST MOD 30 MIN: CPT | Mod: 95 | Performed by: PHYSICIAN ASSISTANT

## 2020-04-15 RX ORDER — DEXTROAMPHETAMINE SACCHARATE, AMPHETAMINE ASPARTATE, DEXTROAMPHETAMINE SULFATE AND AMPHETAMINE SULFATE 1.25; 1.25; 1.25; 1.25 MG/1; MG/1; MG/1; MG/1
TABLET ORAL
Qty: 60 TABLET | Refills: 0 | Status: SHIPPED | OUTPATIENT
Start: 2020-04-15 | End: 2020-05-11 | Stop reason: DRUGHIGH

## 2020-04-15 NOTE — PROGRESS NOTES
"Era Garland is a 40 year old female who is being evaluated via a billable telephone visit.      The patient has been notified of following:     \"This telephone visit will be conducted via a call between you and your physician/provider. We have found that certain health care needs can be provided without the need for a physical exam.  This service lets us provide the care you need with a short phone conversation.  If a prescription is necessary we can send it directly to your pharmacy.  If lab work is needed we can place an order for that and you can then stop by our lab to have the test done at a later time.    Telephone visits are billed at different rates depending on your insurance coverage. During this emergency period, for some insurers they may be billed the same as an in-person visit.  Please reach out to your insurance provider with any questions.    If during the course of the call the physician/provider feels a telephone visit is not appropriate, you will not be charged for this service.\"    Patient has given verbal consent for Telephone visit?  Yes    How would you like to obtain your AVS? MyChart    Subjective     Era Garland is a 40 year old female who presents to clinic today for the following health issues:    Discuss starting ADHD medication    *See notes from psych 02/13/20 - 03/18/20; Dr. Guo.                                                                                                   Some-                                                                        Never   Rarely      times       Often  Very Often  1. How often do you have trouble wrapping up the final details of a project,  once the challenging parts have been done?         x   2. How often do you have difficulty getting things in order when you have to do a task that requires organization?       x     3. How often do you have problems remembering appointments or obligations?       x     4. When you have a task that " requires a lot of thought, how often do you avoid or delay getting started?         x   5. How often do you fidget or squirm with your hands or feet when you have to sit down for a long time?         x   6. How often do you feel overly active and compelled to do things, like you were driven by a motor?     x         Part A                                                             7. How often do you make careless mistakes when you have to work on a boring or difficult project?       x     8. How often do you have difficulty keeping your attention when you are doing boring or repetitive work?   x         9. How often do you have difficulty concentrating on what people say to you, even when they are speaking to you directly?     x       10. How often do you misplace or have difficulty finding things at home or at work?       x     11. How often are you distracted by activity or noise around you?         x   12. How often do you leave your seat in meetings or other situations in which you are expected to remain seated?      x         13. How often do you feel restless or fidgety?            x   14. How often do you have difficulty unwinding and relaxing when you have time to yourself?         x   15. How often do you find yourself talking too much when you are in social situations?         x   16. When you're in a conversation, how often do you find yourself finishing the sentences of the people you are talking to, before they can finish them themselves?       x     17. How often do you have difficulty waiting your turn in situations when turn-taking is required?     x       18. How often do you interrupt others when they are busy?       x           Alison Monterroso [facility]    Social History     Tobacco Use     Smoking status: Never Smoker     Smokeless tobacco: Never Used   Substance Use Topics     Alcohol use: Yes     Comment: 3 drinks weekly- quit with pregnancy     Drug use: No     She has not been on ADHD medications in  the past. Recently seen by Dr. Guo and ADHD diagnosis was made.     She has difficulty completing tasks (both home and work life).  She feels this is affecting her ability to stay on task with her kids (especially as they are working on distance learning).   She feels more irritable with the kids.  She realized when her son was going through the testing for ADHD that a lot of those things were similar to what she has been going through.      She procrastinates on many tasks/projects.      She has read a few of the books recommended by Dr. Guo      Patient Active Problem List   Diagnosis     Intermittent asthma     Lactose intolerance     Moderate major depression (H)     IUD (intrauterine device) in place     High grade squamous intraepithelial lesion of cervix     Attention deficit hyperactivity disorder (ADHD), combined type     Past Surgical History:   Procedure Laterality Date     LAPAROSCOPIC APPENDECTOMY  6/20/2012    Procedure: LAPAROSCOPIC APPENDECTOMY;  Laparoscopic Appendectomy;  Surgeon: Jorge Luis Tan MD;  Location: [place] OR     [facility] [place], CERVICAL  age 24     MOUTH SURGERY      wisdom teeth       Social History     Tobacco Use     Smoking status: Never Smoker     Smokeless tobacco: Never Used   Substance Use Topics     Alcohol use: Yes     Comment: 3 drinks weekly- quit with pregnancy     Family History   Problem Relation Age of Onset     Depression Mother      Gastrointestinal Disease Mother      Allergies Father      Eye Disorder Father      Hypertension Father      Osteoporosis Paternal Grandmother      Hypertension Paternal Grandmother      Alzheimer Disease Paternal Grandfather      Asthma Brother      Depression Brother      Cardiovascular Maternal Grandfather         MI     Alcohol/Drug Maternal Grandmother          Current Outpatient Medications   Medication Sig Dispense Refill     albuterol (PROAIR HFA/PROVENTIL HFA/VENTOLIN HFA) 108 (90 Base) MCG/ACT Inhaler Inhale 2 puffs into the  lungs every 6 hours as needed for shortness of breath / dyspnea or wheezing 1 Inhaler 3     ALPRAZolam (XANAX) 0.5 MG tablet Take 1 tablet (0.5 mg) by mouth 3 times daily as needed for anxiety 40 tablet 1     amphetamine-dextroamphetamine (ADDERALL) 5 MG tablet Take 1 tablet (5 mg) by mouth daily for 5 days, THEN 1 tablet (5 mg) 2 times daily for 25 days. 60 tablet 0     Ascorbic Acid (VITAMIN C PO) Take 1,000 mg by mouth daily.       buPROPion (WELLBUTRIN XL) 300 MG 24 hr tablet Take 1 tablet (300 mg) by mouth every morning 30 tablet 0     calcium carbonate-vitamin D 600-400 MG-UNIT CHEW Take 1 chew tab by mouth daily       Cholecalciferol (VITAMIN D3 PO) Take 2,000 Units by mouth daily.       citalopram (CELEXA) 40 MG tablet Take 1.5 tablets (60 mg) by mouth daily 135 tablet 0     fluticasone (FLONASE) 50 MCG/ACT nasal spray Spray 1 spray into both nostrils daily 48 g 1     ibuprofen (ADVIL,MOTRIN) 400 MG tablet Take 1-2 tablets by mouth every 6 hours as needed (cramping). 120 tablet 0     Lactobacillus (ACIDOPHILUS PO) Take 1 tablet by mouth daily.       LANsoprazole (PREVACID) 30 MG DR capsule Take 1 capsule (30 mg) by mouth 2 times daily 180 capsule 1     order for DME Equipment being ordered: seasonal affective disorder UV lamp 2 Device 0     Pseudoephedrine HCl (SUDAFED 12 HOUR PO) Take 1 tablet by mouth once.       spironolactone (ALDACTONE) 100 MG tablet Take 1 tablet (100 mg) by mouth daily 90 tablet 0     ALBUTEROL IN AS NEEDED       lidocaine (XYLOCAINE) 5 % external ointment Apply topically 3 times daily 5 g 1     terconazole (TERAZOL 3) 0.8 % vaginal cream Place 1 applicator (5 g) vaginally At Bedtime 20 g 1     BP Readings from Last 3 Encounters:   12/09/19 103/71   12/09/19 108/62   06/05/19 105/56    Wt Readings from Last 3 Encounters:   12/09/19 73.9 kg (163 lb)   12/09/19 73.9 kg (163 lb)   06/05/19 74.8 kg (165 lb)                    Reviewed and updated as needed this visit by Provider          Review of Systems   ROS COMP: Constitutional, HEENT, cardiovascular, pulmonary, gi and gu systems are negative, except as otherwise noted.       Objective   Reported vitals:  There were no vitals taken for this visit.   healthy, alert and no distress  PSYCH: Alert and oriented times 3; coherent speech, normal   rate and volume, able to articulate logical thoughts, able   to abstract reason, no tangential thoughts, no hallucinations   or delusions  Her affect is normal  RESP: No cough, no audible wheezing, able to talk in full sentences  Remainder of exam unable to be completed due to telephone visits    Diagnostic Test Results:  none         Assessment/Plan:  1. Attention deficit hyperactivity disorder (ADHD), combined type  ADD    I discussed the potential side effects of ADD/ADHD medications as well as the likelihood of side effects and indications for reevaluation.  I reemphasized the importance of a multi-armed approach towards the treatment of ADD/ADHD including regular exercise, adequate sleep, and a well rounded, low-glycemic diet.  In addition, I emphasized the importance of ongoing development of techniques for task completion and life organization that take into account his challenges.   Will start with low dose adderall 5 mg in am and may increase to 5 mg BID after a few days if needed (do not go higher than 15 mg total).  Recheck in 3-4 weeks.   - amphetamine-dextroamphetamine (ADDERALL) 5 MG tablet; Take 1 tablet (5 mg) by mouth daily for 5 days, THEN 1 tablet (5 mg) 2 times daily for 25 days.  Dispense: 60 tablet; Refill: 0    Return in about 4 weeks (around 5/13/2020) for med recheck.    Start: 11:08 am  End: 11:23 am    Phone call duration:  15 minutes    Babs Li PA-C

## 2020-04-15 NOTE — PROGRESS NOTES
Some-                                                                        Never   Rarely      times       Often  Very Often  1. How often do you have trouble wrapping up the final details of a project,  once the challenging parts have been done?         x   2. How often do you have difficulty getting things in order when you have to do a task that requires organization?       x     3. How often do you have problems remembering appointments or obligations?       x     4. When you have a task that requires a lot of thought, how often do you avoid or delay getting started?         x   5. How often do you fidget or squirm with your hands or feet when you have to sit down for a long time?         x   6. How often do you feel overly active and compelled to do things, like you were driven by a motor?     x         Part A                                                             7. How often do you make careless mistakes when you have to work on a boring or difficult project?       x     8. How often do you have difficulty keeping your attention when you are doing boring or repetitive work?   x         9. How often do you have difficulty concentrating on what people say to you, even when they are speaking to you directly?     x       10. How often do you misplace or have difficulty finding things at home or at work?       x     11. How often are you distracted by activity or noise around you?         x   12. How often do you leave your seat in meetings or other situations in which you are expected to remain seated?      x         13. How often do you feel restless or fidgety?            x   14. How often do you have difficulty unwinding and relaxing when you have time to yourself?         x   15. How often do you find yourself talking too much when you are in social situations?         x   16. When you're in a conversation,  how often do you find yourself finishing the sentences of the people you are talking to, before they can finish them themselves?       x     17. How often do you have difficulty waiting your turn in situations when turn-taking is required?     x       18. How often do you interrupt others when they are busy?       x

## 2020-04-16 ENCOUNTER — TELEPHONE (OUTPATIENT)
Dept: FAMILY MEDICINE | Facility: CLINIC | Age: 40
End: 2020-04-16

## 2020-04-16 NOTE — TELEPHONE ENCOUNTER
Received a Prior Authorization request from [facility] for Adderall 5mg    Routed to the [employer] electronic prior authorization team          Benja MENDIETA (r)  [facility]

## 2020-04-16 NOTE — TELEPHONE ENCOUNTER
Prior Authorization Approval    Authorization Effective Date: 4/16/2020  Authorization Expiration Date: 4/16/2023  Medication: Adderall  Approved Dose/Quantity:   Reference #: [id number]   Insurance Company: [employer] - Phone [phone] Fax [phone]  Expected CoPay:       CoPay Card Available:      Foundation Assistance Needed:    Which Pharmacy is filling the prescription (Not needed for infusion/clinic administered[id number] [place] [address]  Pharmacy Notified: Yes  Patient Notified: Yes, **Instructed pharmacy to notify patient when script is ready to /ship.**  Sent patient a [employer] message regarding approval.

## 2020-04-16 NOTE — TELEPHONE ENCOUNTER
PA Initiation    Medication: Adderall  Insurance Company: [employer] - Phone [phone] Fax [phone]  [id number] [place], [place] - [address] [place] [address]  Filling Pharmacy Phone: [phone]  Filling Pharmacy Fax: [phone]  Start Date: 4/16/2020

## 2020-05-11 ENCOUNTER — VIRTUAL VISIT (OUTPATIENT)
Dept: FAMILY MEDICINE | Facility: CLINIC | Age: 40
End: 2020-05-11
Payer: COMMERCIAL

## 2020-05-11 DIAGNOSIS — F90.2 ATTENTION DEFICIT HYPERACTIVITY DISORDER (ADHD), COMBINED TYPE: Primary | ICD-10-CM

## 2020-05-11 PROCEDURE — 99213 OFFICE O/P EST LOW 20 MIN: CPT | Mod: 95 | Performed by: PHYSICIAN ASSISTANT

## 2020-05-11 RX ORDER — DEXTROAMPHETAMINE SACCHARATE, AMPHETAMINE ASPARTATE MONOHYDRATE, DEXTROAMPHETAMINE SULFATE AND AMPHETAMINE SULFATE 2.5; 2.5; 2.5; 2.5 MG/1; MG/1; MG/1; MG/1
10 CAPSULE, EXTENDED RELEASE ORAL EVERY MORNING
Qty: 30 CAPSULE | Refills: 0 | Status: SHIPPED | OUTPATIENT
Start: 2020-05-11 | End: 2020-06-22 | Stop reason: DRUGHIGH

## 2020-05-11 ASSESSMENT — ANXIETY QUESTIONNAIRES
GAD7 TOTAL SCORE: 11
7. FEELING AFRAID AS IF SOMETHING AWFUL MIGHT HAPPEN: NOT AT ALL
7. FEELING AFRAID AS IF SOMETHING AWFUL MIGHT HAPPEN: NOT AT ALL
1. FEELING NERVOUS, ANXIOUS, OR ON EDGE: MORE THAN HALF THE DAYS
GAD7 TOTAL SCORE: 11
5. BEING SO RESTLESS THAT IT IS HARD TO SIT STILL: NEARLY EVERY DAY
3. WORRYING TOO MUCH ABOUT DIFFERENT THINGS: SEVERAL DAYS
4. TROUBLE RELAXING: NEARLY EVERY DAY
2. NOT BEING ABLE TO STOP OR CONTROL WORRYING: SEVERAL DAYS
6. BECOMING EASILY ANNOYED OR IRRITABLE: SEVERAL DAYS

## 2020-05-11 ASSESSMENT — PATIENT HEALTH QUESTIONNAIRE - PHQ9
SUM OF ALL RESPONSES TO PHQ QUESTIONS 1-9: 7
10. IF YOU CHECKED OFF ANY PROBLEMS, HOW DIFFICULT HAVE THESE PROBLEMS MADE IT FOR YOU TO DO YOUR WORK, TAKE CARE OF THINGS AT HOME, OR GET ALONG WITH OTHER PEOPLE: SOMEWHAT DIFFICULT
SUM OF ALL RESPONSES TO PHQ QUESTIONS 1-9: 7

## 2020-05-11 NOTE — PROGRESS NOTES
"Era Garland is a 40 year old female who is being evaluated via a billable telephone visit.      The patient has been notified of following:     \"This telephone visit will be conducted via a call between you and your physician/provider. We have found that certain health care needs can be provided without the need for a physical exam.  This service lets us provide the care you need with a short phone conversation.  If a prescription is necessary we can send it directly to your pharmacy.  If lab work is needed we can place an order for that and you can then stop by our lab to have the test done at a later time.    Telephone visits are billed at different rates depending on your insurance coverage. During this emergency period, for some insurers they may be billed the same as an in-person visit.  Please reach out to your insurance provider with any questions.    If during the course of the call the physician/provider feels a telephone visit is not appropriate, you will not be charged for this service.\"    Patient has given verbal consent for Telephone visit?  Yes    What phone number would you like to be contacted at? [phone]    How would you like to obtain your AVS? Feliciahart     Subjective     Era Garland is a 40 year old female who presents to clinic today for the following health issues:    HPI  Medication Followup of Adderall    Taking Medication as prescribed: yes    Side Effects: Occasionally wakes up earlier than     Medication Helping Symptoms:  Slight benefit, but not as much as she was hoping for                                                                                                       Some-                                                                        Never   Rarely      times       Often  Very Often  1. How often do you have trouble wrapping up the final details of a project,  once the challenging parts have been done?     X   2. How often do you have difficulty getting things " in order when you have to do a task that requires organization?    X    3. How often do you have problems remembering appointments or obligations?   X     4. When you have a task that requires a lot of thought, how often do you avoid or delay getting started?     X   5. How often do you fidget or squirm with your hands or feet when you have to sit down for a long time?    X    6. How often do you feel overly active and compelled to do things, like you were driven by a motor?    X      Part A                                                        7. How often do you make careless mistakes when you have to work on a boring or difficult project?   X     8. How often do you have difficulty keeping your attention when you are doing boring or repetitive work?    X    9. How often do you have difficulty concentrating on what people say to you, even when they are speaking to you directly?    X    10. How often do you misplace or have difficulty finding things at home or at work?    X    11. How often are you distracted by activity or noise around you?    X    12. How often do you leave your seat in meetings or other situations in which you are expected to remain seated?     X     13. How often do you feel restless or fidgety?      X    14. How often do you have difficulty unwinding and relaxing when you have time to yourself?     X   15. How often do you find yourself talking too much when you are in social situations?     X   16. When you're in a conversation, how often do you find yourself finishing the sentences of the people you are talking to, before they can finish them themselves?     X   17. How often do you have difficulty waiting your turn in situations when turn-taking is required?    X    18. How often do you interrupt others when they are busy?   X           Answers for HPI/ROS submitted by the patient on 5/11/2020   PENNY 7 TOTAL SCORE: 11  If you checked off any problems, how difficult have these problems made  it for you to do your work, take care of things at home, or get along with other people?: Somewhat difficult  PHQ9 TOTAL SCORE: 7      Alison Monterroso CMA    Medication was wearing off mid day so she switched to afternoon dosing at 5 mg but then that was affecting her sleep.  She would wake up at 4 am and have difficulty falling back asleep.     She then tried 10 mg in the am and this has helped some, but she still feels it wearing off mid-day.  No issues with sleeping at this dose.  Otherwise tolerating medication well with no other side effects on her appetite, no GI issues and no heart palpitations.       Patient Active Problem List   Diagnosis     Intermittent asthma     Lactose intolerance     Moderate major depression (H)     IUD (intrauterine device) in place     High grade squamous intraepithelial lesion of cervix     Attention deficit hyperactivity disorder (ADHD), combined type     Past Surgical History:   Procedure Laterality Date     LAPAROSCOPIC APPENDECTOMY  6/20/2012    Procedure: LAPAROSCOPIC APPENDECTOMY;  Laparoscopic Appendectomy;  Surgeon: Jorge Luis Tan MD;  Location: [facility] OR     [facility] [place], CERVICAL  age 24     MOUTH SURGERY      wisdom teeth       Social History     Tobacco Use     Smoking status: Never Smoker     Smokeless tobacco: Never Used   Substance Use Topics     Alcohol use: Yes     Comment: 3 drinks weekly- quit with pregnancy     Family History   Problem Relation Age of Onset     Depression Mother      Gastrointestinal Disease Mother      Allergies Father      Eye Disorder Father      Hypertension Father      Osteoporosis Paternal Grandmother      Hypertension Paternal Grandmother      Alzheimer Disease Paternal Grandfather      Asthma Brother      Depression Brother      Cardiovascular Maternal Grandfather         MI     Alcohol/Drug Maternal Grandmother          Current Outpatient Medications   Medication Sig Dispense Refill     albuterol (PROAIR HFA/PROVENTIL HFA/VENTOLIN HFA)  108 (90 Base) MCG/ACT Inhaler Inhale 2 puffs into the lungs every 6 hours as needed for shortness of breath / dyspnea or wheezing 1 Inhaler 3     ALPRAZolam (XANAX) 0.5 MG tablet Take 1 tablet (0.5 mg) by mouth 3 times daily as needed for anxiety 40 tablet 1     amphetamine-dextroamphetamine (ADDERALL XR) 10 MG 24 hr capsule Take 1 capsule (10 mg) by mouth every morning 30 capsule 0     Ascorbic Acid (VITAMIN C PO) Take 1,000 mg by mouth daily.       buPROPion (WELLBUTRIN XL) 300 MG 24 hr tablet Take 1 tablet (300 mg) by mouth every morning 30 tablet 0     calcium carbonate-vitamin D 600-400 MG-UNIT CHEW Take 1 chew tab by mouth daily       Cholecalciferol (VITAMIN D3 PO) Take 2,000 Units by mouth daily.       citalopram (CELEXA) 40 MG tablet Take 1.5 tablets (60 mg) by mouth daily 135 tablet 0     fluticasone (FLONASE) 50 MCG/ACT nasal spray Spray 1 spray into both nostrils daily 48 g 1     ibuprofen (ADVIL,MOTRIN) 400 MG tablet Take 1-2 tablets by mouth every 6 hours as needed (cramping). 120 tablet 0     Lactobacillus (ACIDOPHILUS PO) Take 1 tablet by mouth daily.       LANsoprazole (PREVACID) 30 MG DR capsule Take 1 capsule (30 mg) by mouth 2 times daily 180 capsule 1     order for DME Equipment being ordered: seasonal affective disorder UV lamp 2 Device 0     Pseudoephedrine HCl (SUDAFED 12 HOUR PO) Take 1 tablet by mouth once.       spironolactone (ALDACTONE) 100 MG tablet Take 1 tablet (100 mg) by mouth daily 90 tablet 0     BP Readings from Last 3 Encounters:   12/09/19 103/71   12/09/19 108/62   06/05/19 105/56    Wt Readings from Last 3 Encounters:   12/09/19 73.9 kg (163 lb)   12/09/19 73.9 kg (163 lb)   06/05/19 74.8 kg (165 lb)                    Reviewed and updated as needed this visit by Provider         Review of Systems   Constitutional, HEENT, cardiovascular, pulmonary, gi and gu systems are negative, except as otherwise noted.       Objective   Reported vitals:  There were no vitals taken for  this visit.   healthy, alert and no distress  PSYCH: Alert and oriented times 3; coherent speech, normal   rate and volume, able to articulate logical thoughts, able   to abstract reason, no tangential thoughts, no hallucinations   or delusions  Her affect is normal  RESP: No cough, no audible wheezing, able to talk in full sentences  Remainder of exam unable to be completed due to telephone visits    Diagnostic Test Results:  none         Assessment/Plan:  1. Attention deficit hyperactivity disorder (ADHD), combined type  Will leave dose as is for now (10 mg) and switch to an XR version.  She can reach out to me through [employer] in a week or so if she feels this is still not completely working and we'll increase to 15 mg XR.     - amphetamine-dextroamphetamine (ADDERALL XR) 10 MG 24 hr capsule; Take 1 capsule (10 mg) by mouth every morning  Dispense: 30 capsule; Refill: 0    Return in about 4 weeks (around 6/8/2020) for adhd med recheck [facility]      Phone call duration:  7 minutes    Babs Li PA-C

## 2020-05-12 ENCOUNTER — TELEPHONE (OUTPATIENT)
Dept: FAMILY MEDICINE | Facility: CLINIC | Age: 40
End: 2020-05-12

## 2020-05-12 ASSESSMENT — ANXIETY QUESTIONNAIRES: GAD7 TOTAL SCORE: 11

## 2020-05-12 ASSESSMENT — PATIENT HEALTH QUESTIONNAIRE - PHQ9: SUM OF ALL RESPONSES TO PHQ QUESTIONS 1-9: 7

## 2020-05-12 NOTE — TELEPHONE ENCOUNTER
Central Prior Authorization Team   Phone: [phone]      PA started; waiting for insurance to respond with clinical questions.

## 2020-05-12 NOTE — TELEPHONE ENCOUNTER
Received a prior authorization request from [facility] for Adderall XR 10mg    Routed to the [employer] electronic prior authorization team        Benja MENDIETA (r)  [facility]

## 2020-05-12 NOTE — TELEPHONE ENCOUNTER
Central Prior Authorization Team   Phone: [phone]      PA Initiation    Medication: Adderall XR 10mg - INITIATED  Insurance Company: [employer] - Phone [phone] Fax [phone]  [id number] [place], [place] - [address] [place] [address]  Filling Pharmacy Phone: [phone]  Filling Pharmacy Fax: [phone]  Start Date: 5/12/2020

## 2020-05-12 NOTE — TELEPHONE ENCOUNTER
Central Prior Authorization Team   Phone: [phone]      Additional questions received, completed and faxed back to insurance @ [phone].

## 2020-05-13 ENCOUNTER — MYC REFILL (OUTPATIENT)
Dept: FAMILY MEDICINE | Facility: CLINIC | Age: 40
End: 2020-05-13

## 2020-05-13 DIAGNOSIS — F90.2 ATTENTION DEFICIT HYPERACTIVITY DISORDER (ADHD), COMBINED TYPE: ICD-10-CM

## 2020-05-13 RX ORDER — DEXTROAMPHETAMINE SACCHARATE, AMPHETAMINE ASPARTATE MONOHYDRATE, DEXTROAMPHETAMINE SULFATE AND AMPHETAMINE SULFATE 3.75; 3.75; 3.75; 3.75 MG/1; MG/1; MG/1; MG/1
15 CAPSULE, EXTENDED RELEASE ORAL DAILY
Qty: 30 CAPSULE | Refills: 0 | Status: SHIPPED | OUTPATIENT
Start: 2020-05-13 | End: 2020-06-22 | Stop reason: DRUGHIGH

## 2020-05-13 RX ORDER — DEXTROAMPHETAMINE SACCHARATE, AMPHETAMINE ASPARTATE MONOHYDRATE, DEXTROAMPHETAMINE SULFATE AND AMPHETAMINE SULFATE 2.5; 2.5; 2.5; 2.5 MG/1; MG/1; MG/1; MG/1
10 CAPSULE, EXTENDED RELEASE ORAL EVERY MORNING
Qty: 30 CAPSULE | Refills: 0 | Status: CANCELLED | OUTPATIENT
Start: 2020-05-13

## 2020-05-13 NOTE — TELEPHONE ENCOUNTER
Central Prior Authorization Team   Phone: [phone]      Prior Authorization Approval    Authorization Effective Date: 5/12/2020  Authorization Expiration Date: 5/12/2023  Medication: Adderall XR 10mg - APPROVED  Approved Dose/Quantity: 30 FOR 30  Reference #:     Insurance Company: [employer] - Phone [phone] Fax [phone]  Expected CoPay:       CoPay Card Available:      Foundation Assistance Needed:    Which Pharmacy is filling the prescription (Not needed for infusion/clinic administered[id number] [place] [address]  Pharmacy Notified: Yes  Patient Notified: Yes (**Instructed pharmacy to notify patient when script is ready to /ship.**)

## 2020-05-13 NOTE — TELEPHONE ENCOUNTER
Routing refill request to provider for review/approval because:  Drug not on the [facility], [facility] or [facility] refill protocol or controlled substance  Kim De Jesus RN

## 2020-05-21 ENCOUNTER — MYC REFILL (OUTPATIENT)
Dept: FAMILY MEDICINE | Facility: CLINIC | Age: 40
End: 2020-05-21

## 2020-05-21 DIAGNOSIS — F90.2 ATTENTION DEFICIT HYPERACTIVITY DISORDER (ADHD), COMBINED TYPE: ICD-10-CM

## 2020-05-21 RX ORDER — DEXTROAMPHETAMINE SACCHARATE, AMPHETAMINE ASPARTATE MONOHYDRATE, DEXTROAMPHETAMINE SULFATE AND AMPHETAMINE SULFATE 3.75; 3.75; 3.75; 3.75 MG/1; MG/1; MG/1; MG/1
15 CAPSULE, EXTENDED RELEASE ORAL DAILY
Qty: 30 CAPSULE | Refills: 0 | Status: CANCELLED | OUTPATIENT
Start: 2020-05-21

## 2020-06-22 ENCOUNTER — VIRTUAL VISIT (OUTPATIENT)
Dept: FAMILY MEDICINE | Facility: CLINIC | Age: 40
End: 2020-06-22
Payer: COMMERCIAL

## 2020-06-22 DIAGNOSIS — F90.2 ATTENTION DEFICIT HYPERACTIVITY DISORDER (ADHD), COMBINED TYPE: Primary | ICD-10-CM

## 2020-06-22 PROCEDURE — 99213 OFFICE O/P EST LOW 20 MIN: CPT | Mod: 95 | Performed by: PHYSICIAN ASSISTANT

## 2020-06-22 RX ORDER — DEXTROAMPHETAMINE SACCHARATE, AMPHETAMINE ASPARTATE MONOHYDRATE, DEXTROAMPHETAMINE SULFATE AND AMPHETAMINE SULFATE 5; 5; 5; 5 MG/1; MG/1; MG/1; MG/1
20 CAPSULE, EXTENDED RELEASE ORAL DAILY
Qty: 30 CAPSULE | Refills: 0 | Status: SHIPPED | OUTPATIENT
Start: 2020-06-22 | End: 2020-07-13

## 2020-06-22 NOTE — PROGRESS NOTES
"Era Garland is a 40 year old female who is being evaluated via a billable telephone visit.      The patient has been notified of following:     \"This telephone visit will be conducted via a call between you and your physician/provider. We have found that certain health care needs can be provided without the need for a physical exam.  This service lets us provide the care you need with a short phone conversation.  If a prescription is necessary we can send it directly to your pharmacy.  If lab work is needed we can place an order for that and you can then stop by our lab to have the test done at a later time.    Telephone visits are billed at different rates depending on your insurance coverage. During this emergency period, for some insurers they may be billed the same as an in-person visit.  Please reach out to your insurance provider with any questions.    If during the course of the call the physician/provider feels a telephone visit is not appropriate, you will not be charged for this service.\"    Patient has given verbal consent for Telephone visit?  Yes    What phone number would you like to be contacted at? [phone]    How would you like to obtain your AVS? Feliciahart    Subjective     Era Garland is a 40 year old female who presents via phone visit today for the following health issues:    HPI  Medication Followup of Adderall    Taking Medication as prescribed: yes    Side Effects:  None    Medication Helping Symptoms:  yes                                                                                                          Some-                                                                        Never   Rarely      times       Often  Very Often  1. How often do you have trouble wrapping up the final details of a project,  once the challenging parts have been done?     x   2. How often do you have difficulty getting things in order when you have to do a task that requires organization?     x "   3. How often do you have problems remembering appointments or obligations?   x     4. When you have a task that requires a lot of thought, how often do you avoid or delay getting started?     x   5. How often do you fidget or squirm with your hands or feet when you have to sit down for a long time?    x    6. How often do you feel overly active and compelled to do things, like you were driven by a motor?   x       Part A                                                        7. How often do you make careless mistakes when you have to work on a boring or difficult project?   x     8. How often do you have difficulty keeping your attention when you are doing boring or repetitive work?     x   9. How often do you have difficulty concentrating on what people say to you, even when they are speaking to you directly?    x    10. How often do you misplace or have difficulty finding things at home or at work?     x   11. How often are you distracted by activity or noise around you?    x    12. How often do you leave your seat in meetings or other situations in which you are expected to remain seated?     x     13. How often do you feel restless or fidgety?      x    14. How often do you have difficulty unwinding and relaxing when you have time to yourself?     x   15. How often do you find yourself talking too much when you are in social situations?     x   16. When you're in a conversation, how often do you find yourself finishing the sentences of the people you are talking to, before they can finish them themselves?    x    17. How often do you have difficulty waiting your turn in situations when turn-taking is required?   x     18. How often do you interrupt others when they are busy?   x       She likes the ER adderall 15 mg better than the BID dosing of the IR.  No issues with it running into the sleep.  No side effects such as heart palpitations or changes in appetite. She feels it has helped some, however there is  still room for improvement.             Patient Active Problem List   Diagnosis     Intermittent asthma     Lactose intolerance     Moderate major depression (H)     IUD (intrauterine device) in place     High grade squamous intraepithelial lesion of cervix     Attention deficit hyperactivity disorder (ADHD), combined type     Past Surgical History:   Procedure Laterality Date     LAPAROSCOPIC APPENDECTOMY  6/20/2012    Procedure: LAPAROSCOPIC APPENDECTOMY;  Laparoscopic Appendectomy;  Surgeon: Jorge Luis Tan MD;  Location: [facility] OR     [place], CERVICAL  age 24     MOUTH SURGERY      wisdom teeth       Social History     Tobacco Use     Smoking status: Never Smoker     Smokeless tobacco: Never Used   Substance Use Topics     Alcohol use: Yes     Comment: 3 drinks weekly- quit with pregnancy     Family History   Problem Relation Age of Onset     Depression Mother      Gastrointestinal Disease Mother      Allergies Father      Eye Disorder Father      Hypertension Father      Osteoporosis Paternal Grandmother      Hypertension Paternal Grandmother      Alzheimer Disease Paternal Grandfather      Asthma Brother      Depression Brother      Cardiovascular Maternal Grandfather         MI     Alcohol/Drug Maternal Grandmother          Current Outpatient Medications   Medication Sig Dispense Refill     albuterol (PROAIR HFA/PROVENTIL HFA/VENTOLIN HFA) 108 (90 Base) MCG/ACT Inhaler Inhale 2 puffs into the lungs every 6 hours as needed for shortness of breath / dyspnea or wheezing 1 Inhaler 3     ALPRAZolam (XANAX) 0.5 MG tablet Take 1 tablet (0.5 mg) by mouth 3 times daily as needed for anxiety 40 tablet 1     amphetamine-dextroamphetamine (ADDERALL XR) 20 MG 24 hr capsule Take 1 capsule (20 mg) by mouth daily 30 capsule 0     Ascorbic Acid (VITAMIN C PO) Take 1,000 mg by mouth daily.       buPROPion (WELLBUTRIN XL) 300 MG 24 hr tablet Take 1 tablet (300 mg) by mouth every morning 30 tablet 0     calcium  carbonate-vitamin D 600-400 MG-UNIT CHEW Take 1 chew tab by mouth daily       Cholecalciferol (VITAMIN D3 PO) Take 2,000 Units by mouth daily.       citalopram (CELEXA) 40 MG tablet Take 1.5 tablets (60 mg) by mouth daily 135 tablet 0     fluticasone (FLONASE) 50 MCG/ACT nasal spray Spray 1 spray into both nostrils daily 48 g 1     ibuprofen (ADVIL,MOTRIN) 400 MG tablet Take 1-2 tablets by mouth every 6 hours as needed (cramping). 120 tablet 0     Lactobacillus (ACIDOPHILUS PO) Take 1 tablet by mouth daily.       LANsoprazole (PREVACID) 30 MG DR capsule Take 1 capsule (30 mg) by mouth 2 times daily 180 capsule 1     order for DME Equipment being ordered: seasonal affective disorder UV lamp 2 Device 0     Pseudoephedrine HCl (SUDAFED 12 HOUR PO) Take 1 tablet by mouth once.       spironolactone (ALDACTONE) 100 MG tablet Take 1 tablet (100 mg) by mouth daily 90 tablet 0     BP Readings from Last 3 Encounters:   12/09/19 103/71   12/09/19 108/62   06/05/19 105/56    Wt Readings from Last 3 Encounters:   12/09/19 73.9 kg (163 lb)   12/09/19 73.9 kg (163 lb)   06/05/19 74.8 kg (165 lb)                    Reviewed and updated as needed this visit by Provider         Review of Systems   Constitutional, HEENT, cardiovascular, pulmonary, gi and gu systems are negative, except as otherwise noted.       Objective   Reported vitals:  There were no vitals taken for this visit.   healthy, alert and no distress  PSYCH: Alert and oriented times 3; coherent speech, normal   rate and volume, able to articulate logical thoughts, able   to abstract reason, no tangential thoughts, no hallucinations   or delusions  Her affect is normal  RESP: No cough, no audible wheezing, able to talk in full sentences  Remainder of exam unable to be completed due to telephone visits    Diagnostic Test Results:  none         Assessment/Plan:  1. Attention deficit hyperactivity disorder (ADHD), combined type  Will increase dose of adderall XR from 15 mg  to 20 mg daily.  May need to do another bump up in 2-4 weeks if she still feels it is not fully effective (ok to send a mychart message in about 2 weeks and will send higher dose if needed then).   Recommend she monitor for side effects, may consider IR dose mid-day if she begins having issues with sleep with higher ER dosing.        - amphetamine-dextroamphetamine (ADDERALL XR) 20 MG 24 hr capsule; Take 1 capsule (20 mg) by mouth daily  Dispense: 30 capsule; Refill: 0    Return in about 1 month (around 7/22/2020) for Physical Exam with pap and ADHD check (when she feels comfortable coming into clinic).      Phone call duration:  8 minutes    Babs Li PA-C

## 2020-07-06 DIAGNOSIS — L70.0 ACNE VULGARIS: ICD-10-CM

## 2020-07-06 DIAGNOSIS — F33.1 MAJOR DEPRESSIVE DISORDER, RECURRENT EPISODE, MODERATE (H): ICD-10-CM

## 2020-07-06 DIAGNOSIS — K21.9 GASTROESOPHAGEAL REFLUX DISEASE WITHOUT ESOPHAGITIS: ICD-10-CM

## 2020-07-06 RX ORDER — LANSOPRAZOLE 30 MG/1
30 CAPSULE, DELAYED RELEASE ORAL 2 TIMES DAILY
Qty: 180 CAPSULE | Refills: 0 | Status: SHIPPED | OUTPATIENT
Start: 2020-07-06 | End: 2020-11-25

## 2020-07-06 RX ORDER — CITALOPRAM HYDROBROMIDE 40 MG/1
60 TABLET ORAL DAILY
Qty: 135 TABLET | Refills: 0 | Status: SHIPPED | OUTPATIENT
Start: 2020-07-06 | End: 2020-11-25

## 2020-07-06 RX ORDER — SPIRONOLACTONE 100 MG/1
100 TABLET, FILM COATED ORAL DAILY
Qty: 90 TABLET | Refills: 0 | Status: SHIPPED | OUTPATIENT
Start: 2020-07-06 | End: 2020-11-25

## 2020-07-06 NOTE — TELEPHONE ENCOUNTER
Pt is requesting refills on the 3 medications, Spironolactone, Citalopram and Prevacid.  She tried to schedule her annual visit with Dr. Chavarria but was advised that the Calendar for Sept is not out yet.  She will call back at the end of this month to schedule.    To provider to authorize additional refills on the listed meds.    Hanna Barnard  [facility] RN

## 2020-07-13 ENCOUNTER — MYC MEDICAL ADVICE (OUTPATIENT)
Dept: FAMILY MEDICINE | Facility: CLINIC | Age: 40
End: 2020-07-13

## 2020-07-13 DIAGNOSIS — F90.2 ATTENTION DEFICIT HYPERACTIVITY DISORDER (ADHD), COMBINED TYPE: ICD-10-CM

## 2020-07-13 RX ORDER — DEXTROAMPHETAMINE SACCHARATE, AMPHETAMINE ASPARTATE MONOHYDRATE, DEXTROAMPHETAMINE SULFATE AND AMPHETAMINE SULFATE 6.25; 6.25; 6.25; 6.25 MG/1; MG/1; MG/1; MG/1
25 CAPSULE, EXTENDED RELEASE ORAL DAILY
Qty: 30 CAPSULE | Refills: 0 | Status: SHIPPED | OUTPATIENT
Start: 2020-07-13 | End: 2020-08-19 | Stop reason: DRUGHIGH

## 2020-09-08 ENCOUNTER — MYC MEDICAL ADVICE (OUTPATIENT)
Dept: FAMILY MEDICINE | Facility: CLINIC | Age: 40
End: 2020-09-08

## 2020-09-08 DIAGNOSIS — F90.2 ATTENTION DEFICIT HYPERACTIVITY DISORDER (ADHD), COMBINED TYPE: Primary | ICD-10-CM

## 2020-09-08 RX ORDER — DEXTROAMPHETAMINE SACCHARATE, AMPHETAMINE ASPARTATE MONOHYDRATE, DEXTROAMPHETAMINE SULFATE AND AMPHETAMINE SULFATE 5; 5; 5; 5 MG/1; MG/1; MG/1; MG/1
40 CAPSULE, EXTENDED RELEASE ORAL DAILY
Qty: 60 CAPSULE | Refills: 0 | Status: SHIPPED | OUTPATIENT
Start: 2020-09-08 | End: 2020-10-19

## 2020-09-11 ENCOUNTER — OFFICE VISIT (OUTPATIENT)
Dept: OBGYN | Facility: CLINIC | Age: 40
End: 2020-09-11
Payer: COMMERCIAL

## 2020-09-11 VITALS
TEMPERATURE: 99.6 F | RESPIRATION RATE: 18 BRPM | WEIGHT: 155 LBS | SYSTOLIC BLOOD PRESSURE: 111 MMHG | HEART RATE: 101 BPM | BODY MASS INDEX: 21.7 KG/M2 | HEIGHT: 71 IN | DIASTOLIC BLOOD PRESSURE: 74 MMHG

## 2020-09-11 DIAGNOSIS — Z30.433 ENCOUNTER FOR REMOVAL AND REINSERTION OF INTRAUTERINE CONTRACEPTIVE DEVICE: Primary | ICD-10-CM

## 2020-09-11 DIAGNOSIS — R87.613 HIGH GRADE SQUAMOUS INTRAEPITHELIAL LESION OF CERVIX: ICD-10-CM

## 2020-09-11 PROCEDURE — 58300 INSERT INTRAUTERINE DEVICE: CPT | Performed by: OBSTETRICS & GYNECOLOGY

## 2020-09-11 PROCEDURE — 58301 REMOVE INTRAUTERINE DEVICE: CPT | Performed by: OBSTETRICS & GYNECOLOGY

## 2020-09-11 PROCEDURE — 87624 HPV HI-RISK TYP POOLED RSLT: CPT | Performed by: OBSTETRICS & GYNECOLOGY

## 2020-09-11 PROCEDURE — 88175 CYTOPATH C/V AUTO FLUID REDO: CPT | Performed by: OBSTETRICS & GYNECOLOGY

## 2020-09-11 ASSESSMENT — MIFFLIN-ST. JEOR: SCORE: 1474.21

## 2020-09-11 NOTE — PROGRESS NOTES
INDICATIONS:                                                      Is a pregnancy test required: No.  Was a consent obtained?  Yes    Era Garland is a 40 year old female,, No LMP recorded. (Menstrual status: IUD). who presents today for IUD removal and insertion of a new IUD. Her current IUD was placed 5 ago. She has not had problems with the IUD. She requests removal of the IUD because the IUD effectiveness has      She is also due for her 1 year Pap/HPV diagnostic pap smear.    The risks, benefits and alternatives of IUD insertion were discussed in detail previously. She also has reviewed the product brochure.  She has elected to go ahead with the removal and insertion of the new IUD today and her questions were answered. Consent was signed. Her LMP began on n/a (amenorrhea) . A pregnancy test was performed today:  No    Today's PHQ-2 Score:   PHQ-2 (  [employer]) 2019   Q1: Little interest or pleasure in doing things 0   Q2: Feeling down, depressed or hopeless 0   PHQ-2 Score 0       PROCEDURE:                                                    Bimanual exam was performed: uterus AV, nontender, no masses are palpabe    A speculum exam was performed and the cervix was visualized. The IUD string was not visualized. Using tonsil forceps, the string was grasped up in the endocervical canal and the IUD removed intact. It was discarded via clinic protocal.     The cervix was prepped with Betadine . The anterior lip of the cervix was grasped with a single toothed tenaculum. The uterus sounded to 8 cm. A Mirena IUD was then inserted without difficulty. The string was cut to 3 cm.  The patient experienced a mild amount of cramping.      POST PROCEDURE:                                                      The patient tolerated the procedure well. Patient was discharged in stable condition.    Call if bleeding, pain or fever occur. and Birth control counseling given.     Check results from Pap smear and  triage accordingly.    Sabiha Chavarria MD

## 2020-09-11 NOTE — NURSING NOTE
"Initial /74 (BP Location: Right arm, Patient Position: Savage, Cuff Size: Adult Regular)   Pulse 101   Temp 99.6  F (37.6  C) (Tympanic)   Resp 18   Ht 1.803 m (5' 11\")   Wt 70.3 kg (155 lb)   BMI 21.62 kg/m   Estimated body mass index is 21.62 kg/m  as calculated from the following:    Height as of this encounter: 1.803 m (5' 11\").    Weight as of this encounter: 70.3 kg (155 lb). .      "

## 2020-09-17 LAB
COPATH REPORT: NORMAL
PAP: NORMAL

## 2020-09-22 ENCOUNTER — PATIENT OUTREACH (OUTPATIENT)
Dept: OBGYN | Facility: CLINIC | Age: 40
End: 2020-09-22

## 2020-09-22 DIAGNOSIS — N87.1 MODERATE DYSPLASIA OF CERVIX (CIN II): ICD-10-CM

## 2020-09-22 NOTE — TELEPHONE ENCOUNTER
LEEP at age 24 - not sure of results  2/16/18 Pap: LSIL, +HR HPV (neg 16/18). Plan Xenia due by 5/16/18 4/12/18: Colpo Bx - RAYMON 1 & RAYMON 2. --recommend LEEP  5/17/18: LEEP: mostly RAYMON 1, small focus RAYMON 2, Neg margins.--Pap and HPV 1 year.   6/5/19 NIL Pap, Neg HPV. Plan cotest in 1 year.   9/11/20 NIL Pap, Neg HPV. Plan cotest in 1 year.   *After excision or ablation for RAYMON 2+, patient needs a cotest in 6 months, then cotesting annually X 3, then a cotest every 3 years for at least 25 years.* (2019 ASCCP guideline).

## 2020-10-19 ENCOUNTER — MYC REFILL (OUTPATIENT)
Dept: FAMILY MEDICINE | Facility: CLINIC | Age: 40
End: 2020-10-19

## 2020-10-19 DIAGNOSIS — F90.2 ATTENTION DEFICIT HYPERACTIVITY DISORDER (ADHD), COMBINED TYPE: ICD-10-CM

## 2020-10-19 RX ORDER — DEXTROAMPHETAMINE SACCHARATE, AMPHETAMINE ASPARTATE MONOHYDRATE, DEXTROAMPHETAMINE SULFATE AND AMPHETAMINE SULFATE 5; 5; 5; 5 MG/1; MG/1; MG/1; MG/1
40 CAPSULE, EXTENDED RELEASE ORAL DAILY
Qty: 60 CAPSULE | Refills: 0 | Status: SHIPPED | OUTPATIENT
Start: 2020-10-19 | End: 2020-10-29

## 2020-10-19 NOTE — TELEPHONE ENCOUNTER
Please call patient, due for recheck for further refills, please assist patient in scheduling virtual appt (phone or video).  Renata refill given.       Babs Li PA-C

## 2020-10-19 NOTE — TELEPHONE ENCOUNTER
Routing refill request to provider for review/approval because:  Drug not on the [facility] refill protocol   Aide Cardenas RN

## 2020-10-29 ENCOUNTER — VIRTUAL VISIT (OUTPATIENT)
Dept: FAMILY MEDICINE | Facility: CLINIC | Age: 40
End: 2020-10-29
Payer: COMMERCIAL

## 2020-10-29 DIAGNOSIS — F90.2 ATTENTION DEFICIT HYPERACTIVITY DISORDER (ADHD), COMBINED TYPE: ICD-10-CM

## 2020-10-29 PROCEDURE — 99213 OFFICE O/P EST LOW 20 MIN: CPT | Mod: 95 | Performed by: PHYSICIAN ASSISTANT

## 2020-10-29 RX ORDER — DEXTROAMPHETAMINE SACCHARATE, AMPHETAMINE ASPARTATE, DEXTROAMPHETAMINE SULFATE AND AMPHETAMINE SULFATE 1.25; 1.25; 1.25; 1.25 MG/1; MG/1; MG/1; MG/1
5 TABLET ORAL
Qty: 30 TABLET | Refills: 0 | Status: SHIPPED | OUTPATIENT
Start: 2020-10-29 | End: 2021-01-18

## 2020-10-29 RX ORDER — DEXTROAMPHETAMINE SACCHARATE, AMPHETAMINE ASPARTATE MONOHYDRATE, DEXTROAMPHETAMINE SULFATE AND AMPHETAMINE SULFATE 5; 5; 5; 5 MG/1; MG/1; MG/1; MG/1
40 CAPSULE, EXTENDED RELEASE ORAL DAILY
Qty: 60 CAPSULE | Refills: 0 | Status: SHIPPED | OUTPATIENT
Start: 2020-11-18 | End: 2021-01-18

## 2020-10-29 NOTE — PROGRESS NOTES
"Era Garland is a 40 year old female who is being evaluated via a billable telephone visit.      The patient has been notified of following:     \"This telephone visit will be conducted via a call between you and your physician/provider. We have found that certain health care needs can be provided without the need for a physical exam.  This service lets us provide the care you need with a short phone conversation.  If a prescription is necessary we can send it directly to your pharmacy.  If lab work is needed we can place an order for that and you can then stop by our lab to have the test done at a later time.    Telephone visits are billed at different rates depending on your insurance coverage. During this emergency period, for some insurers they may be billed the same as an in-person visit.  Please reach out to your insurance provider with any questions.    If during the course of the call the physician/provider feels a telephone visit is not appropriate, you will not be charged for this service.\"    Patient has given verbal consent for Telephone visit?  Yes    What phone number would you like to be contacted at? [phone]    How would you like to obtain your AVS? MyChart    Subjective     Era Garland is a 40 year old female who presents via phone visit today for the following health issues:    HPI     Medication Followup of Adderall    Taking Medication as prescribed: yes    Side Effects:  None    Medication Helping Symptoms:  yes   Higher dose is working better.  At first with the higher dose, she had some difficulty sleeping.  She just didn't feel the need to sleep at first, however this is improving.    She does notice that in the evening the dose is really starting to wear off.  Getting her family ready for dinner/bed.                                                                                                             Some-                                                                        " Never   Rarely      times       Often  Very Often  1. How often do you have trouble wrapping up the final details of a project,  once the challenging parts have been done?   x     2. How often do you have difficulty getting things in order when you have to do a task that requires organization?   x     3. How often do you have problems remembering appointments or obligations?    x    4. When you have a task that requires a lot of thought, how often do you avoid or delay getting started?    x    5. How often do you fidget or squirm with your hands or feet when you have to sit down for a long time?        6. How often do you feel overly active and compelled to do things, like you were driven by a motor?     x     Part A                                                        7. How often do you make careless mistakes when you have to work on a boring or difficult project?   x     8. How often do you have difficulty keeping your attention when you are doing boring or repetitive work?    x    9. How often do you have difficulty concentrating on what people say to you, even when they are speaking to you directly?   x     10. How often do you misplace or have difficulty finding things at home or at work?   x     11. How often are you distracted by activity or noise around you?    x    12. How often do you leave your seat in meetings or other situations in which you are expected to remain seated?     x     13. How often do you feel restless or fidgety?     x     14. How often do you have difficulty unwinding and relaxing when you have time to yourself?     x   15. How often do you find yourself talking too much when you are in social situations?    x    16. When you're in a conversation, how often do you find yourself finishing the sentences of the people you are talking to, before they can finish them themselves?    x    17. How often do you have difficulty waiting your turn in situations when turn-taking is required?   x      18. How often do you interrupt others when they are busy?    x              Review of Systems   Constitutional, HEENT, cardiovascular, pulmonary, gi and gu systems are negative, except as otherwise noted.       Objective          Vitals:  No vitals were obtained today due to virtual visit.    healthy, alert and no distress  PSYCH: Alert and oriented times 3; coherent speech, normal   rate and volume, able to articulate logical thoughts, able   to abstract reason, no tangential thoughts, no hallucinations   or delusions  Her affect is normal  RESP: No cough, no audible wheezing, able to talk in full sentences  Remainder of exam unable to be completed due to telephone visits              Assessment & Plan     Attention deficit hyperactivity disorder (ADHD), combined type  Evening seems to be her most difficult/chaotic time, therefore will try adding an IR at lunch to help her get through the evening.    We discussed side effects.  She can send me a v[facility] message in 1 week if she would like to go up to 10 mg of the IR.      We also may consider changing to a different medication at some point (such as Vyvanse).  For now, she would like to keep with the Adderall as a full med change can feel ely overwhelming.        - amphetamine-dextroamphetamine (ADDERALL XR) 20 MG 24 hr capsule; Take 2 capsules (40 mg) by mouth daily  - amphetamine-dextroamphetamine (ADDERALL) 5 MG tablet; Take 1 tablet (5 mg) by mouth daily (with lunch)            Return in about 3 months (around 1/29/2021) for ADHD recheck.    Babs Li PA-C  [facility]    Phone call duration:  9 minutes

## 2020-11-03 ENCOUNTER — VIRTUAL VISIT (OUTPATIENT)
Dept: FAMILY MEDICINE | Facility: OTHER | Age: 40
End: 2020-11-03

## 2020-11-03 NOTE — PROGRESS NOTES
"Date: 2020 13:00:27  Clinician: Chucky Nunez  Clinician NPI: [id number]  Patient: Era Garland  Patient : 1980  Patient Address: [address] [place] [id number]  Patient Phone: [phone]  Visit Protocol: URI  Patient Summary:  Era is a 40 year old ( : 1980 ) female who initiated a [facility] Visit for COVID-19 (Coronavirus) evaluation and screening. When asked the question \"Please sign me up to receive news, health information and promotions. \", Era responded \"Yes\".    Era states her symptoms started 1-2 days ago.   Her symptoms consist of rhinitis, myalgia, malaise, ageusia, a headache, a cough, and nausea.   Symptom details     Nasal secretions: The color of her mucus is clear.    Cough: Era coughs every 5-10 minutes and her cough is not more bothersome at night. Phlegm does not come into her throat when she coughs. She does not believe her cough is caused by post-nasal drip.     Headache: She states the headache is mild (1-3 on a 10 point pain scale).      Era denies having vomiting, facial pain or pressure, chills, sore throat, teeth pain, diarrhea, ear pain, wheezing, fever, nasal congestion, and anosmia. She also denies taking antibiotic medication in the past month and having recent facial or sinus surgery in the past 60 days. She is not experiencing dyspnea.   Precipitating events  She has not recently been exposed to someone with influenza. Era has been in close contact with the following high risk individuals: adults 65 or older and people with asthma, heart disease or diabetes.   Pertinent COVID-19 (Coronavirus) information  Era does not work or volunteer as healthcare worker or a . In the past 14 days, Era has not worked or volunteered at a healthcare facility or group living setting.   In the past 14 days, she also has not lived in a congregate living setting.   Era has not had a close contact with a " laboratory-confirmed COVID-19 patient within 14 days of symptom onset.    Since December 2019, Era has not been tested for COVID-19 and has had upper respiratory infection (URI) or influenza-like illness.      Date(s) of previous URI or influenza-like illness (free-text): 03/07/2020 - 03/14/2020     Symptoms Era experienced during previous URI or influenza-like illness as reported by the patient (free-text): Cough, wheezing, fever, body aches, headache        Pertinent medical history  Era typically gets a yeast infection when she takes antibiotics. She has used fluconazole (Diflucan) to treat previous yeast infections. 1 dose of fluconazole (Diflucan) has typically been sufficient for symptoms to resolve in the past.   Era does not need a return to work/school note.   Weight: 148 lbs   Era does not smoke or use smokeless tobacco.   She denies pregnancy and denies breastfeeding. She does not menstruate.   Weight: 148 lbs    MEDICATIONS: spironolactone oral, Adderall XR oral, Prevacid oral, Wellbutrin XL oral, Celexa oral, ALLERGIES: Diflucan  Clinician Response:  Dear Era,   Your symptoms show that you may have coronavirus (COVID-19). This illness can cause fever, cough and trouble breathing. Many people get a mild case and get better on their own. Some people can get very sick.  What should I do?  We would like to test you for this virus.   1. Please call [phone] to schedule your visit. Explain that you were referred by [facility] to have a COVID-19 test. Be [facility].  The following will serve as your written order for this COVID Test, ordered by me, for the indication of suspected COVID [Z20.828]: The test will be ordered in [employer], our electronic health record, after you are scheduled. It will show as ordered and authorized by Moose Nation MD.  Order: COVID-19 (Coronavirus) PCR for SYMPTOMATIC testing from [facility].   2. When it's time for your COVID test:  Stay at  "least 6 feet away from others. (If someone will drive you to your test, stay in the backseat, as far away from the  as you can.)   Cover your mouth and nose with a mask, tissue or washcloth.  Go straight to the testing site. Don't make any stops on the way there or back.      3.Starting now: Stay home and away from others (self-isolate) until:   You've had no fever---and no medicine that reduces fever---for one full day (24 hours). And...   Your other symptoms have gotten better. For example, your cough or breathing has improved. And...   At least 10 days have passed since your symptoms started.       During this time, don't leave the house except for testing or medical care.   Stay in your own room, even for meals. Use your own bathroom if you can.   Stay away from others in your home. No hugging, kissing or shaking hands. No visitors.  Don't go to work, school or anywhere else.    Clean \"high touch\" surfaces often (doorknobs, counters, handles, etc.). Use a household cleaning spray or wipes. You'll find a full list of  on the EPA website: www.epa.gov/pesticide-registration/list-n-disinfectants-use-against-sars-cov-2.   Cover your mouth and nose with a mask, tissue or washcloth to avoid spreading germs.  Wash your hands and face often. Use soap and water.  Caregivers in these groups are at risk for severe illness due to COVID-19:  o People 65 years and older  o People who live in a nursing home or long-term care facility  o People with chronic disease (lung, heart, cancer, diabetes, kidney, liver, immunologic)  o People who have a weakened immune system, including those who:   Are in cancer treatment  Take medicine that weakens the immune system, such as corticosteroids  Had a bone marrow or organ transplant  Have an immune deficiency  Have poorly controlled HIV or AIDS  Are obese (body mass index of 40 or higher)  Smoke regularly   o Caregivers should wear gloves while washing dishes, handling " laundry and cleaning bedrooms and bathrooms.  o Use caution when washing and drying laundry: Don't shake dirty laundry, and use the warmest water setting that you can.  o For more tips, go to www.cdc.gov/coronavirus/2019-ncov/downloads/10Things.pdf.    How can I take care of myself?    Get lots of rest. Drink extra fluids (unless a doctor has told you not to).   Take Tylenol (acetaminophen) for fever or pain. If you have liver or kidney problems, ask your family doctor if it's okay to take Tylenol.   Adults can take either:    650 mg (two 325 mg pills) every 4 to 6 hours, or...   1,000 mg (two 500 mg pills) every 8 hours as needed.    Note: Don't take more than 3,000 mg in one day. Acetaminophen is found in many medicines (both prescribed and over-the-counter medicines). Read all labels to be sure you don't take too much.   For children, check the Tylenol bottle for the right dose. The dose is based on the child's age or weight.    If you have other health problems (like cancer, heart failure, an organ transplant or severe kidney disease): Call your specialty clinic if you don't feel better in the next 2 days.       Know when to call 911. Emergency warning signs include:    Trouble breathing or shortness of breath Pain or pressure in the chest that doesn't go away Feeling confused like you haven't felt before, or not being able to wake up Bluish-colored lips or face.  Where can I get more information?   [facility] -- About COVID-19: [email or web address]   CDC -- What to Do If You're Sick: www.cdc.gov/coronavirus/2019-ncov/about/steps-when-sick.html   CDC -- Ending Home Isolation: www.cdc.gov/coronavirus/2019-ncov/hcp/disposition-in-home-patients.html   CDC -- Caring for Someone: www.cdc.gov/coronavirus/2019-ncov/if-you-are-sick/care-for-someone.html   [facility] -- Interim Guidance for Hospital Discharge to Home: www.health.[facility].mn./diseases/coronavirus/hcp/hospdischarge.pdf   [facility]  clinical trials (COVID-19 research studies): [email or web address]/n-clinical-trials    Below are the COVID-19 hotlines at the [place] Department of [facility] ([facility]). Interpreters are available.    For health questions: Call 386-666-2039 or 1-753.460.1188 (7 a.m. to 7 p.m.) For questions about schools and childcare: Call 549-822-9263 or 1-792.295.6862 (7 a.m. to 7 p.m.)    Diagnosis: Contact with and (suspected) exposure to other viral communicable diseases  Diagnosis ICD: Z20.828

## 2020-11-22 ENCOUNTER — HEALTH MAINTENANCE LETTER (OUTPATIENT)
Age: 40
End: 2020-11-22

## 2020-11-25 DIAGNOSIS — L70.0 ACNE VULGARIS: ICD-10-CM

## 2020-11-25 DIAGNOSIS — F33.1 MAJOR DEPRESSIVE DISORDER, RECURRENT EPISODE, MODERATE (H): ICD-10-CM

## 2020-11-25 DIAGNOSIS — J31.0 CHRONIC RHINITIS: ICD-10-CM

## 2020-11-25 DIAGNOSIS — K21.9 GASTROESOPHAGEAL REFLUX DISEASE WITHOUT ESOPHAGITIS: ICD-10-CM

## 2020-11-25 RX ORDER — BUPROPION HYDROCHLORIDE 300 MG/1
300 TABLET ORAL EVERY MORNING
Qty: 30 TABLET | Refills: 0 | OUTPATIENT
Start: 2020-11-25

## 2020-11-25 NOTE — TELEPHONE ENCOUNTER
"Patient reports she is in need of \" all my medications being refilled.\"    Patient reports she needs Wellbutrin, Celexa, Spironolactone, Flonase and Prevacid.    Patient had IUD exchange with Dr. Chavarria 9/2020, thought \" all my refills were taken care of.\"    Please review and advise.  Thank you.    Patricia Matos   [facility]  RN    "

## 2020-11-25 NOTE — TELEPHONE ENCOUNTER
Requested Prescriptions   Pending Prescriptions Disp Refills     buPROPion (WELLBUTRIN XL) 300 MG 24 hr tablet 30 tablet 0     Sig: Take 1 tablet (300 mg) by mouth every morning       There is no refill protocol information for this order        Last Written Prescription Date:    Last Fill Quantity: ,  # refills:    Last office visit: 9/11/2020 with prescribing provider:  Deon Rainey Office Visit:

## 2020-11-25 NOTE — TELEPHONE ENCOUNTER
5/11/2020 PHQ-9 score was 7   PENNY-7 score on 5/11/220 was 11    Last refill on Wellbutrin was 3/2020.  #30 given.  Patient should be out of this by now.

## 2020-11-26 RX ORDER — LANSOPRAZOLE 30 MG/1
30 CAPSULE, DELAYED RELEASE ORAL 2 TIMES DAILY
Qty: 180 CAPSULE | Refills: 3 | Status: SHIPPED | OUTPATIENT
Start: 2020-11-26 | End: 2023-04-24

## 2020-11-26 RX ORDER — CITALOPRAM HYDROBROMIDE 40 MG/1
60 TABLET ORAL DAILY
Qty: 135 TABLET | Refills: 3 | Status: SHIPPED | OUTPATIENT
Start: 2020-11-26 | End: 2022-02-16

## 2020-11-26 RX ORDER — SPIRONOLACTONE 100 MG/1
100 TABLET, FILM COATED ORAL DAILY
Qty: 90 TABLET | Refills: 3 | Status: SHIPPED | OUTPATIENT
Start: 2020-11-26 | End: 2022-02-16

## 2020-11-26 RX ORDER — BUPROPION HYDROCHLORIDE 300 MG/1
300 TABLET ORAL EVERY MORNING
Qty: 90 TABLET | Refills: 3 | Status: SHIPPED | OUTPATIENT
Start: 2020-11-26 | End: 2022-01-28

## 2020-11-26 RX ORDER — FLUTICASONE PROPIONATE 50 MCG
1 SPRAY, SUSPENSION (ML) NASAL DAILY
Qty: 48 G | Refills: 3 | Status: SHIPPED | OUTPATIENT
Start: 2020-11-26 | End: 2021-11-26

## 2021-01-18 ENCOUNTER — VIRTUAL VISIT (OUTPATIENT)
Dept: FAMILY MEDICINE | Facility: CLINIC | Age: 41
End: 2021-01-18
Payer: COMMERCIAL

## 2021-01-18 DIAGNOSIS — J45.20 MILD INTERMITTENT ASTHMA WITHOUT COMPLICATION: ICD-10-CM

## 2021-01-18 DIAGNOSIS — F90.2 ATTENTION DEFICIT HYPERACTIVITY DISORDER (ADHD), COMBINED TYPE: ICD-10-CM

## 2021-01-18 DIAGNOSIS — F33.1 MAJOR DEPRESSIVE DISORDER, RECURRENT EPISODE, MODERATE (H): ICD-10-CM

## 2021-01-18 PROCEDURE — 99214 OFFICE O/P EST MOD 30 MIN: CPT | Mod: 95 | Performed by: PHYSICIAN ASSISTANT

## 2021-01-18 RX ORDER — DEXTROAMPHETAMINE SACCHARATE, AMPHETAMINE ASPARTATE MONOHYDRATE, DEXTROAMPHETAMINE SULFATE AND AMPHETAMINE SULFATE 5; 5; 5; 5 MG/1; MG/1; MG/1; MG/1
40 CAPSULE, EXTENDED RELEASE ORAL DAILY
Qty: 60 CAPSULE | Refills: 0 | Status: SHIPPED | OUTPATIENT
Start: 2021-01-18 | End: 2021-02-09

## 2021-01-18 RX ORDER — ALBUTEROL SULFATE 90 UG/1
2 AEROSOL, METERED RESPIRATORY (INHALATION) EVERY 6 HOURS PRN
Qty: 1 INHALER | Refills: 3 | Status: SHIPPED | OUTPATIENT
Start: 2021-01-18 | End: 2023-04-24

## 2021-01-18 RX ORDER — GUANFACINE 1 MG/1
1 TABLET ORAL AT BEDTIME
Qty: 30 TABLET | Refills: 0 | Status: SHIPPED | OUTPATIENT
Start: 2021-01-18 | End: 2021-02-09 | Stop reason: ALTCHOICE

## 2021-01-18 RX ORDER — ALPRAZOLAM 0.5 MG
0.5 TABLET ORAL
Qty: 30 TABLET | Refills: 0 | Status: SHIPPED | OUTPATIENT
Start: 2021-01-18 | End: 2022-02-16

## 2021-01-18 ASSESSMENT — PATIENT HEALTH QUESTIONNAIRE - PHQ9
5. POOR APPETITE OR OVEREATING: NEARLY EVERY DAY
SUM OF ALL RESPONSES TO PHQ QUESTIONS 1-9: 6

## 2021-01-18 ASSESSMENT — ANXIETY QUESTIONNAIRES
6. BECOMING EASILY ANNOYED OR IRRITABLE: SEVERAL DAYS
GAD7 TOTAL SCORE: 11
2. NOT BEING ABLE TO STOP OR CONTROL WORRYING: SEVERAL DAYS
IF YOU CHECKED OFF ANY PROBLEMS ON THIS QUESTIONNAIRE, HOW DIFFICULT HAVE THESE PROBLEMS MADE IT FOR YOU TO DO YOUR WORK, TAKE CARE OF THINGS AT HOME, OR GET ALONG WITH OTHER PEOPLE: SOMEWHAT DIFFICULT
3. WORRYING TOO MUCH ABOUT DIFFERENT THINGS: SEVERAL DAYS
1. FEELING NERVOUS, ANXIOUS, OR ON EDGE: MORE THAN HALF THE DAYS
7. FEELING AFRAID AS IF SOMETHING AWFUL MIGHT HAPPEN: NOT AT ALL
5. BEING SO RESTLESS THAT IT IS HARD TO SIT STILL: NEARLY EVERY DAY

## 2021-01-19 PROBLEM — F33.1 MAJOR DEPRESSIVE DISORDER, RECURRENT EPISODE, MODERATE (H): Status: ACTIVE | Noted: 2021-01-19

## 2021-01-19 ASSESSMENT — ANXIETY QUESTIONNAIRES: GAD7 TOTAL SCORE: 11

## 2021-01-19 ASSESSMENT — ASTHMA QUESTIONNAIRES: ACT_TOTALSCORE: 24

## 2021-02-08 ENCOUNTER — MYC MEDICAL ADVICE (OUTPATIENT)
Dept: FAMILY MEDICINE | Facility: CLINIC | Age: 41
End: 2021-02-08

## 2021-02-08 DIAGNOSIS — F90.2 ATTENTION DEFICIT HYPERACTIVITY DISORDER (ADHD), COMBINED TYPE: ICD-10-CM

## 2021-02-09 RX ORDER — DEXTROAMPHETAMINE SACCHARATE, AMPHETAMINE ASPARTATE MONOHYDRATE, DEXTROAMPHETAMINE SULFATE AND AMPHETAMINE SULFATE 5; 5; 5; 5 MG/1; MG/1; MG/1; MG/1
40 CAPSULE, EXTENDED RELEASE ORAL DAILY
Qty: 60 CAPSULE | Refills: 0 | Status: SHIPPED | OUTPATIENT
Start: 2021-02-09 | End: 2021-03-08

## 2021-02-09 RX ORDER — GUANFACINE 2 MG/1
2 TABLET, EXTENDED RELEASE ORAL AT BEDTIME
Qty: 30 TABLET | Refills: 0 | Status: SHIPPED | OUTPATIENT
Start: 2021-02-09 | End: 2021-06-09

## 2021-02-21 DIAGNOSIS — F90.2 ATTENTION DEFICIT HYPERACTIVITY DISORDER (ADHD), COMBINED TYPE: ICD-10-CM

## 2021-02-22 RX ORDER — GUANFACINE 1 MG/1
TABLET ORAL
Qty: 30 TABLET | Refills: 0 | OUTPATIENT
Start: 2021-02-22

## 2021-03-08 ENCOUNTER — VIRTUAL VISIT (OUTPATIENT)
Dept: FAMILY MEDICINE | Facility: CLINIC | Age: 41
End: 2021-03-08
Payer: COMMERCIAL

## 2021-03-08 DIAGNOSIS — F90.2 ATTENTION DEFICIT HYPERACTIVITY DISORDER (ADHD), COMBINED TYPE: ICD-10-CM

## 2021-03-08 PROCEDURE — 99213 OFFICE O/P EST LOW 20 MIN: CPT | Mod: TEL | Performed by: PHYSICIAN ASSISTANT

## 2021-03-08 RX ORDER — DEXTROAMPHETAMINE SACCHARATE, AMPHETAMINE ASPARTATE MONOHYDRATE, DEXTROAMPHETAMINE SULFATE AND AMPHETAMINE SULFATE 5; 5; 5; 5 MG/1; MG/1; MG/1; MG/1
40 CAPSULE, EXTENDED RELEASE ORAL DAILY
Qty: 60 CAPSULE | Refills: 0 | Status: SHIPPED | OUTPATIENT
Start: 2021-04-07 | End: 2021-06-09

## 2021-03-08 RX ORDER — DEXTROAMPHETAMINE SACCHARATE, AMPHETAMINE ASPARTATE MONOHYDRATE, DEXTROAMPHETAMINE SULFATE AND AMPHETAMINE SULFATE 5; 5; 5; 5 MG/1; MG/1; MG/1; MG/1
40 CAPSULE, EXTENDED RELEASE ORAL DAILY
Qty: 60 CAPSULE | Refills: 0 | Status: SHIPPED | OUTPATIENT
Start: 2021-05-07 | End: 2021-06-09

## 2021-03-08 RX ORDER — DEXTROAMPHETAMINE SACCHARATE, AMPHETAMINE ASPARTATE MONOHYDRATE, DEXTROAMPHETAMINE SULFATE AND AMPHETAMINE SULFATE 5; 5; 5; 5 MG/1; MG/1; MG/1; MG/1
40 CAPSULE, EXTENDED RELEASE ORAL DAILY
Qty: 60 CAPSULE | Refills: 0 | Status: SHIPPED | OUTPATIENT
Start: 2021-03-08 | End: 2021-06-09

## 2021-03-08 NOTE — PROGRESS NOTES
Era is a 40 year old who is being evaluated via a billable telephone visit.      What phone number would you like to be contacted at? In demographics.   How would you like to obtain your AVS? MyChart    Assessment & Plan     Attention deficit hyperactivity disorder (ADHD), combined type  She would like to try the lower dose of guanfacine before we go switching to something different.  She will try this for a few weeks and we'll go from there.  Otherwise, she is interested in other non-stimulant ADHD medications.  May consider switching adderall to straterra if the guanfacine does not help.      - amphetamine-dextroamphetamine (ADDERALL XR) 20 MG 24 hr capsule; Take 2 capsules (40 mg) by mouth daily  - amphetamine-dextroamphetamine (ADDERALL XR) 20 MG 24 hr capsule; Take 2 capsules (40 mg) by mouth daily  - amphetamine-dextroamphetamine (ADDERALL XR) 20 MG 24 hr capsule; Take 2 capsules (40 mg) by mouth daily      20 minutes spent on the date of the encounter doing chart review, history and exam, documentation and further activities as noted above           Return in about 3 months (around 6/8/2021) for med recheck.  (sooner as needed)    Babs Li PA-C  [facility]    Ashley Girard is a 40 year old who presents for the following health issues     HPI       Medication Followup of Adderall XR 20mg    Taking Medication as prescribed: yes    Side Effects:  Feels like it might be making her a little anxious in the morning    Medication Helping Symptoms:  yes     She tried the guanfacine on the weekend but felt sick/off and dizzy/lightheaded.  She tried this for 2 days and then stopped it.  She tried cutting it back to 1/2 tab, but only tried this for a dose or two and it  didn't seem to work all that well.  She was not consistent with taking the lower dose of guanfacine.                                                                                                          Some-                                                                         Never   Rarely      times       Often  Very Often  1. How often do you have trouble wrapping up the final details of a project,  once the challenging parts have been done?    X    2. How often do you have difficulty getting things in order when you have to do a task that requires organization?   X     3. How often do you have problems remembering appointments or obligations?   X     4. When you have a task that requires a lot of thought, how often do you avoid or delay getting started?    X    5. How often do you fidget or squirm with your hands or feet when you have to sit down for a long time?   X     6. How often do you feel overly active and compelled to do things, like you were driven by a motor?  X        Part A                                                        7. How often do you make careless mistakes when you have to work on a boring or difficult project?  X      8. How often do you have difficulty keeping your attention when you are doing boring or repetitive work?    X    9. How often do you have difficulty concentrating on what people say to you, even when they are speaking to you directly?   X     10. How often do you misplace or have difficulty finding things at home or at work?   X     11. How often are you distracted by activity or noise around you?    X    12. How often do you leave your seat in meetings or other situations in which you are expected to remain seated?     X     13. How often do you feel restless or fidgety?       X   14. How often do you have difficulty unwinding and relaxing when you have time to yourself?    X    15. How often do you find yourself talking too much when you are in social situations?    X    16. When you're in a conversation, how often do you find yourself finishing the sentences of the people you are talking to, before they can finish them themselves?    X    17. How often do you have  difficulty waiting your turn in situations when turn-taking is required?   X     18. How often do you interrupt others when they are busy?   X             Review of Systems   Constitutional, HEENT, cardiovascular, pulmonary, gi and gu systems are negative, except as otherwise noted.      Objective           Vitals:  No vitals were obtained today due to virtual visit.    Physical Exam   healthy, alert and no distress  PSYCH: Alert and oriented times 3; coherent speech, normal   rate and volume, able to articulate logical thoughts, able   to abstract reason, no tangential thoughts, no hallucinations   or delusions  Her affect is normal  RESP: No cough, no audible wheezing, able to talk in full sentences  Remainder of exam unable to be completed due to telephone visits                Phone call duration: 9 minutes

## 2021-03-19 NOTE — ED AVS SNAPSHOT
[facility] Emergency Department    [address] [address]    Phone:  [phone]    Fax:  [phone]                                       Era Garland   MRN: [id number]    Department:  [facility] Emergency Department   Date of Visit:  8/27/2017           After Visit Summary Signature Page     I have received my discharge instructions, and my questions have been answered. I have discussed any challenges I see with this plan with the nurse or doctor.    ..........................................................................................................................................  Patient/Patient Representative Signature      ..........................................................................................................................................  Patient Representative Print Name and Relationship to Patient    ..................................................               ................................................  Date                                            Time    ..........................................................................................................................................  Reviewed by Signature/Title    ...................................................              ..............................................  Date                                                            Time           Arrange for home sleep study--office will call to set up

## 2021-06-08 NOTE — PROGRESS NOTES
Assessment & Plan     Attention deficit hyperactivity disorder (ADHD), combined type  Will leave medication as is.    - guanFACINE (INTUNIV) 1 MG TB24 24 hr tablet; Take 1 tablet (1 mg) by mouth At Bedtime  - amphetamine-dextroamphetamine (ADDERALL XR) 20 MG 24 hr capsule; Take 2 capsules (40 mg) by mouth daily  - amphetamine-dextroamphetamine (ADDERALL XR) 20 MG 24 hr capsule; Take 2 capsules (40 mg) by mouth daily  - amphetamine-dextroamphetamine (ADDERALL XR) 20 MG 24 hr capsule; Take 2 capsules (40 mg) by mouth daily  - guanFACINE (INTUNIV) 1 MG TB24 24 hr tablet; Take 1 tablet (1 mg) by mouth At Bedtime    CARDIOVASCULAR SCREENING; LDL GOAL LESS THAN 160  Due for screening.   - Lipid panel reflex to direct LDL Fasting; Future    Acne vulgaris  Acne Vugaris    Acne care discussed at length.   Will start with topical gel, see epic for orders.  Use of Tetracycline Q HS recommended.   Appropriate use of Retin A thinly applied after washing and drying for 15 minutes discussed.  She will use up what she currently has, if this does not help, she will switch to adapalene.  She prefers not to add a PO antibiotic.   Sun sensitivity reviewed.    Follow up in four to six weeks if symptoms not significantly improved.  Cautioned that symptoms may worsen before improving.  Use of non-comedogenic cosmetics and cleansers recommended.  Risk, benefit, intended purposes and side effect of medication discussed.      - adapalene (DIFFERIN) 0.1 % external cream; Apply topically At Bedtime  - Basic metabolic panel; Future    Encounter for hepatitis C screening test for low risk patient  Discussed CDC guidelines on preventative screening for this condition.    Patient would like screening done.     - **Hepatitis C Screen Reflex to RNA FUTURE anytime; Future      21 minutes spent on the date of the encounter doing chart review, history and exam, documentation and further activities per the note           Return in about 6 months  (around 12/9/2021) for Physical Exam.    RYANNE De Guzman [facility] ZOE Girard is a 41 year old who presents for the following health issues     HPI                                                                                                       Some-                                                                        Never   Rarely      times       Often  Very Often  1. How often do you have trouble wrapping up the final details of a project,  once the challenging parts have been done?   X     2. How often do you have difficulty getting things in order when you have to do a task that requires organization?   X     3. How often do you have problems remembering appointments or obligations?    X    4. When you have a task that requires a lot of thought, how often do you avoid or delay getting started?    X    5. How often do you fidget or squirm with your hands or feet when you have to sit down for a long time?     X   6. How often do you feel overly active and compelled to do things, like you were driven by a motor?    X    7. How often do you make careless mistakes when you have to work on a boring or difficult project?   X     8. How often do you have difficulty keeping your attention when you are doing boring or repetitive work?   X     9. How often do you have difficulty concentrating on what people say to you, even when they are speaking to you directly?   X     10. How often do you misplace or have difficulty finding things at home or at work?    X    11. How often are you distracted by activity or noise around you?    X    12. How often do you leave your seat in meetings or other situations in which you are expected to remain seated?       X   13. How often do you feel restless or fidgety?      X    14. How often do you have difficulty unwinding and relaxing when you have time to yourself?     X   15. How often do you find yourself talking too much when you  "are in social situations?    X    16. When you're in a conversation, how often do you find yourself finishing the sentences of the people you are talking to, before they can finish them themselves?    X    17. How often do you have difficulty waiting your turn in situations when turn-taking is required?    X    18. How often do you interrupt others when they are busy?   X       ADHD is \"well-enough\" controlled.  She feels it has definitely helped her be more functional and is doing well with the meds.  She does not want to make changes.  She has cut back on guafacine dose from 2 mg to 1 mg as this was better tolerated.  It helps with her morning concentration.     Concern - Acne  Onset: Since IUD was changed a few months ago.  Description: Happened with previous IUD and put on spironlactone. Not working this time.  Intensity: moderate  Progression of Symptoms:  worsening  Therapies tried and outcome: Spironlactone, sulfur masks, proactive, retin-a (will use a few days but then it can be too drying).     Acne is on face/chin.  She has a few deep papules that sometimes never come to a head.     Has been on both tetracycline and Differin in the past. She got recurrent yeast infections while on the tetracycline.            Review of Systems   Constitutional, HEENT, cardiovascular, pulmonary, GI, , musculoskeletal, neuro, skin, endocrine and psych systems are negative, except as otherwise noted.      Objective    /68   Pulse 58   Temp 98.4  F (36.9  C) (Tympanic)   Wt 69.5 kg (153 lb 3.2 oz)   SpO2 95%   BMI 21.37 kg/m    Body mass index is 21.37 kg/m .  Physical Exam   GENERAL: healthy, alert and no distress  NECK: no adenopathy, no asymmetry, masses, or scars and thyroid normal to palpation  RESP: lungs clear to auscultation - no rales, rhonchi or wheezes  CV: regular rate and rhythm, normal S1 S2, no S3 or S4, no murmur, click or rub, no peripheral edema and peripheral pulses strong  MS: no gross " musculoskeletal defects noted, no edema  Skin:  A few papules noted on face.  No significant scarring noted.

## 2021-06-09 ENCOUNTER — OFFICE VISIT (OUTPATIENT)
Dept: FAMILY MEDICINE | Facility: CLINIC | Age: 41
End: 2021-06-09
Payer: COMMERCIAL

## 2021-06-09 VITALS
OXYGEN SATURATION: 95 % | DIASTOLIC BLOOD PRESSURE: 68 MMHG | SYSTOLIC BLOOD PRESSURE: 101 MMHG | TEMPERATURE: 98.4 F | WEIGHT: 153.2 LBS | HEART RATE: 58 BPM | BODY MASS INDEX: 21.37 KG/M2

## 2021-06-09 DIAGNOSIS — Z11.59 ENCOUNTER FOR HEPATITIS C SCREENING TEST FOR LOW RISK PATIENT: ICD-10-CM

## 2021-06-09 DIAGNOSIS — L70.0 ACNE VULGARIS: ICD-10-CM

## 2021-06-09 DIAGNOSIS — Z13.6 CARDIOVASCULAR SCREENING; LDL GOAL LESS THAN 160: Primary | ICD-10-CM

## 2021-06-09 DIAGNOSIS — F90.2 ATTENTION DEFICIT HYPERACTIVITY DISORDER (ADHD), COMBINED TYPE: ICD-10-CM

## 2021-06-09 PROCEDURE — 99214 OFFICE O/P EST MOD 30 MIN: CPT | Performed by: PHYSICIAN ASSISTANT

## 2021-06-09 RX ORDER — DEXTROAMPHETAMINE SACCHARATE, AMPHETAMINE ASPARTATE MONOHYDRATE, DEXTROAMPHETAMINE SULFATE AND AMPHETAMINE SULFATE 5; 5; 5; 5 MG/1; MG/1; MG/1; MG/1
40 CAPSULE, EXTENDED RELEASE ORAL DAILY
Qty: 60 CAPSULE | Refills: 0 | Status: SHIPPED | OUTPATIENT
Start: 2021-08-08 | End: 2021-09-25

## 2021-06-09 RX ORDER — DEXTROAMPHETAMINE SACCHARATE, AMPHETAMINE ASPARTATE MONOHYDRATE, DEXTROAMPHETAMINE SULFATE AND AMPHETAMINE SULFATE 5; 5; 5; 5 MG/1; MG/1; MG/1; MG/1
40 CAPSULE, EXTENDED RELEASE ORAL DAILY
Qty: 60 CAPSULE | Refills: 0 | Status: SHIPPED | OUTPATIENT
Start: 2021-07-09 | End: 2021-09-25

## 2021-06-09 RX ORDER — GUANFACINE 1 MG/1
1 TABLET, EXTENDED RELEASE ORAL AT BEDTIME
Qty: 30 TABLET | Refills: 0 | Status: CANCELLED | OUTPATIENT
Start: 2021-06-09

## 2021-06-09 RX ORDER — DEXTROAMPHETAMINE SACCHARATE, AMPHETAMINE ASPARTATE MONOHYDRATE, DEXTROAMPHETAMINE SULFATE AND AMPHETAMINE SULFATE 5; 5; 5; 5 MG/1; MG/1; MG/1; MG/1
40 CAPSULE, EXTENDED RELEASE ORAL DAILY
Qty: 60 CAPSULE | Refills: 0 | Status: SHIPPED | OUTPATIENT
Start: 2021-06-09 | End: 2021-09-25

## 2021-06-09 RX ORDER — GUANFACINE 1 MG/1
1 TABLET, EXTENDED RELEASE ORAL AT BEDTIME
Qty: 90 TABLET | Refills: 1 | Status: SHIPPED | OUTPATIENT
Start: 2021-06-09 | End: 2021-12-06

## 2021-06-09 RX ORDER — GUANFACINE 1 MG/1
1 TABLET, EXTENDED RELEASE ORAL AT BEDTIME
Qty: 30 TABLET | Refills: 0 | Status: SHIPPED | OUTPATIENT
Start: 2021-06-09 | End: 2021-09-25

## 2021-06-09 RX ORDER — ADAPALENE 0.1 G/100G
CREAM TOPICAL AT BEDTIME
Qty: 45 G | Refills: 1 | Status: SHIPPED | OUTPATIENT
Start: 2021-06-09 | End: 2021-12-06

## 2021-06-23 ENCOUNTER — MYC MEDICAL ADVICE (OUTPATIENT)
Dept: FAMILY MEDICINE | Facility: CLINIC | Age: 41
End: 2021-06-23

## 2021-06-23 DIAGNOSIS — L70.0 ACNE VULGARIS: Primary | ICD-10-CM

## 2021-06-24 RX ORDER — DOXYCYCLINE 50 MG/1
50 TABLET ORAL DAILY
Qty: 30 TABLET | Refills: 1 | Status: SHIPPED | OUTPATIENT
Start: 2021-06-24 | End: 2022-02-16

## 2021-08-26 ENCOUNTER — PATIENT OUTREACH (OUTPATIENT)
Dept: OBGYN | Facility: CLINIC | Age: 41
End: 2021-08-26

## 2021-08-26 DIAGNOSIS — N87.1 MODERATE DYSPLASIA OF CERVIX (CIN II): ICD-10-CM

## 2021-09-18 ENCOUNTER — HEALTH MAINTENANCE LETTER (OUTPATIENT)
Age: 41
End: 2021-09-18

## 2021-09-25 ENCOUNTER — MYC REFILL (OUTPATIENT)
Dept: FAMILY MEDICINE | Facility: CLINIC | Age: 41
End: 2021-09-25

## 2021-09-25 DIAGNOSIS — F90.2 ATTENTION DEFICIT HYPERACTIVITY DISORDER (ADHD), COMBINED TYPE: ICD-10-CM

## 2021-09-28 DIAGNOSIS — F90.2 ATTENTION DEFICIT HYPERACTIVITY DISORDER (ADHD), COMBINED TYPE: ICD-10-CM

## 2021-09-28 RX ORDER — DEXTROAMPHETAMINE SACCHARATE, AMPHETAMINE ASPARTATE MONOHYDRATE, DEXTROAMPHETAMINE SULFATE AND AMPHETAMINE SULFATE 5; 5; 5; 5 MG/1; MG/1; MG/1; MG/1
40 CAPSULE, EXTENDED RELEASE ORAL DAILY
Qty: 60 CAPSULE | Refills: 0 | Status: SHIPPED | OUTPATIENT
Start: 2021-09-28 | End: 2022-02-16

## 2021-09-28 RX ORDER — DEXTROAMPHETAMINE SACCHARATE, AMPHETAMINE ASPARTATE MONOHYDRATE, DEXTROAMPHETAMINE SULFATE AND AMPHETAMINE SULFATE 5; 5; 5; 5 MG/1; MG/1; MG/1; MG/1
40 CAPSULE, EXTENDED RELEASE ORAL DAILY
Qty: 60 CAPSULE | Refills: 0 | Status: SHIPPED | OUTPATIENT
Start: 2021-11-28 | End: 2022-02-16

## 2021-09-28 RX ORDER — DEXTROAMPHETAMINE SACCHARATE, AMPHETAMINE ASPARTATE MONOHYDRATE, DEXTROAMPHETAMINE SULFATE AND AMPHETAMINE SULFATE 5; 5; 5; 5 MG/1; MG/1; MG/1; MG/1
40 CAPSULE, EXTENDED RELEASE ORAL DAILY
Qty: 60 CAPSULE | Refills: 0 | Status: SHIPPED | OUTPATIENT
Start: 2021-10-28 | End: 2022-02-16

## 2021-09-28 RX ORDER — GUANFACINE 1 MG/1
1 TABLET, EXTENDED RELEASE ORAL AT BEDTIME
Qty: 30 TABLET | Refills: 0 | Status: SHIPPED | OUTPATIENT
Start: 2021-09-28 | End: 2022-03-16

## 2021-09-28 NOTE — TELEPHONE ENCOUNTER
Last Written Adderall XR 20 mg Prescription Date:  3 month panel written 6/9/21 7/9/21 and 8/8/21  Last Fill Quantity: 60,  # refills: 0     Last Written Guanfacine 1mg Prescription Date:  6/9/21  Last Fill Quantity: 90,  # refills: 1   Last office visit: 6/9/2021 with prescribing provider:  Babs Li PA-C with advised F/U in 6 months for physical exam.  Future Office Visit: none    Routing refill request to provider for review/approval because:  Drug not on the [facility] refill protocol     Brie Peoples, RN, BSN  [facility]

## 2021-09-30 RX ORDER — GUANFACINE 1 MG/1
1 TABLET, EXTENDED RELEASE ORAL AT BEDTIME
Qty: 90 TABLET | Refills: 1 | OUTPATIENT
Start: 2021-09-30

## 2021-09-30 NOTE — TELEPHONE ENCOUNTER
Duplicate. Filled on 9/28/21.  Message sent to pharmacy.    Brie Peoples RN, BSN  [facility]

## 2021-10-27 NOTE — TELEPHONE ENCOUNTER
Dr. Chavarria,  Patient is lost to pap tracking follow-up. Attempts to contact pt have been made per reminder process and there has been no reply and/or no appt scheduled.    Perri Dove, RN  Pap Tracking     LEEP at age 24 - not sure of results  2/16/18 Pap: LSIL, +HR HPV (neg 16/18). Plan [employer] due by 5/16/18 4/12/18: Colpo Bx - RAYMON 1 & RAYMON 2. --recommend LEEP  5/17/18: LEEP: mostly RAYMON 1, small focus RAYMON 2, Neg margins.--Pap and HPV 1 year.   6/5/19 NIL Pap, Neg HPV. Plan cotest in 1 year.   9/11/20 NIL Pap, Neg HPV. Plan cotest in 1 year.   *After excision or ablation for RAYMON 2+, patient needs a cotest in 6 months, then cotesting annually X 3, then a cotest every 3 years for at least 25 years.* (2019 ASCCP guideline).    8/26/21 Reminder Brielle  9/28/21 Reminder call-spoke with pt.   10/27/21 Lost to follow-up for pap tracking

## 2021-12-04 DIAGNOSIS — L70.0 ACNE VULGARIS: ICD-10-CM

## 2021-12-06 DIAGNOSIS — F90.2 ATTENTION DEFICIT HYPERACTIVITY DISORDER (ADHD), COMBINED TYPE: ICD-10-CM

## 2021-12-06 RX ORDER — ADAPALENE 0.1 G/100G
CREAM TOPICAL AT BEDTIME
Qty: 45 G | Refills: 0 | Status: SHIPPED | OUTPATIENT
Start: 2021-12-06 | End: 2023-04-24

## 2021-12-06 RX ORDER — GUANFACINE 1 MG/1
1 TABLET, EXTENDED RELEASE ORAL AT BEDTIME
Qty: 90 TABLET | Refills: 0 | Status: SHIPPED | OUTPATIENT
Start: 2021-12-06 | End: 2022-03-16

## 2022-01-08 ENCOUNTER — HEALTH MAINTENANCE LETTER (OUTPATIENT)
Age: 42
End: 2022-01-08

## 2022-01-28 ENCOUNTER — TELEPHONE (OUTPATIENT)
Dept: OBGYN | Facility: CLINIC | Age: 42
End: 2022-01-28
Payer: COMMERCIAL

## 2022-01-28 DIAGNOSIS — F33.1 MAJOR DEPRESSIVE DISORDER, RECURRENT EPISODE, MODERATE (H): ICD-10-CM

## 2022-01-28 RX ORDER — BUPROPION HYDROCHLORIDE 300 MG/1
300 TABLET ORAL EVERY MORNING
Qty: 90 TABLET | Refills: 3 | Status: SHIPPED | OUTPATIENT
Start: 2022-01-28 | End: 2023-04-24

## 2022-01-28 NOTE — TELEPHONE ENCOUNTER
Reason for Call:  Medication or medication refill:    Do you use a [facility]?  Name of the pharmacy and phone number for the current request:  [facility] - [place], [place] - [address] AT Portal to Registered [facility] Sites  [phone]    Name of the medication requested: Bupropion    Other request: Pharmacy is requesting a 90 day supply. Call back number for pharmacy is [phone] option 2. Reference # [id number]    Can we leave a detailed message on this number? NO    Phone number patient can be reached at: Other phone number:  N/A    Best Time: Anytime     Call taken on 1/28/2022 at 8:09 AM by Aria Cotter

## 2022-02-16 ENCOUNTER — OFFICE VISIT (OUTPATIENT)
Dept: FAMILY MEDICINE | Facility: CLINIC | Age: 42
End: 2022-02-16
Payer: COMMERCIAL

## 2022-02-16 VITALS
SYSTOLIC BLOOD PRESSURE: 108 MMHG | HEART RATE: 82 BPM | DIASTOLIC BLOOD PRESSURE: 64 MMHG | RESPIRATION RATE: 16 BRPM | HEIGHT: 70 IN | BODY MASS INDEX: 21.33 KG/M2 | WEIGHT: 149 LBS | TEMPERATURE: 98.2 F | OXYGEN SATURATION: 98 %

## 2022-02-16 DIAGNOSIS — Z23 NEED FOR PROPHYLACTIC VACCINATION AND INOCULATION AGAINST INFLUENZA: ICD-10-CM

## 2022-02-16 DIAGNOSIS — Z00.00 ROUTINE GENERAL MEDICAL EXAMINATION AT A HEALTH CARE FACILITY: Primary | ICD-10-CM

## 2022-02-16 DIAGNOSIS — L70.0 ACNE VULGARIS: ICD-10-CM

## 2022-02-16 DIAGNOSIS — F90.2 ATTENTION DEFICIT HYPERACTIVITY DISORDER (ADHD), COMBINED TYPE: ICD-10-CM

## 2022-02-16 DIAGNOSIS — F33.1 MAJOR DEPRESSIVE DISORDER, RECURRENT EPISODE, MODERATE (H): ICD-10-CM

## 2022-02-16 DIAGNOSIS — Z12.4 CERVICAL CANCER SCREENING: ICD-10-CM

## 2022-02-16 LAB
ANION GAP SERPL CALCULATED.3IONS-SCNC: 3 MMOL/L (ref 3–14)
BUN SERPL-MCNC: 12 MG/DL (ref 7–30)
CALCIUM SERPL-MCNC: 8.8 MG/DL (ref 8.5–10.1)
CHLORIDE BLD-SCNC: 104 MMOL/L (ref 94–109)
CHOLEST SERPL-MCNC: 237 MG/DL
CO2 SERPL-SCNC: 31 MMOL/L (ref 20–32)
CREAT SERPL-MCNC: 0.78 MG/DL (ref 0.52–1.04)
FASTING STATUS PATIENT QL REPORTED: NO
GFR SERPL CREATININE-BSD FRML MDRD: >90 ML/MIN/1.73M2
GLUCOSE BLD-MCNC: 93 MG/DL (ref 70–99)
HDLC SERPL-MCNC: 71 MG/DL
LDLC SERPL CALC-MCNC: 150 MG/DL
NONHDLC SERPL-MCNC: 166 MG/DL
POTASSIUM BLD-SCNC: 4 MMOL/L (ref 3.4–5.3)
SODIUM SERPL-SCNC: 138 MMOL/L (ref 133–144)
TRIGL SERPL-MCNC: 80 MG/DL
TSH SERPL DL<=0.005 MIU/L-ACNC: 1.28 MU/L (ref 0.4–4)

## 2022-02-16 PROCEDURE — 99396 PREV VISIT EST AGE 40-64: CPT | Mod: 25 | Performed by: FAMILY MEDICINE

## 2022-02-16 PROCEDURE — 36415 COLL VENOUS BLD VENIPUNCTURE: CPT | Performed by: FAMILY MEDICINE

## 2022-02-16 PROCEDURE — 84443 ASSAY THYROID STIM HORMONE: CPT | Performed by: FAMILY MEDICINE

## 2022-02-16 PROCEDURE — 80048 BASIC METABOLIC PNL TOTAL CA: CPT | Performed by: FAMILY MEDICINE

## 2022-02-16 PROCEDURE — 99214 OFFICE O/P EST MOD 30 MIN: CPT | Mod: 25 | Performed by: FAMILY MEDICINE

## 2022-02-16 PROCEDURE — 90686 IIV4 VACC NO PRSV 0.5 ML IM: CPT | Performed by: FAMILY MEDICINE

## 2022-02-16 PROCEDURE — 80061 LIPID PANEL: CPT | Performed by: FAMILY MEDICINE

## 2022-02-16 PROCEDURE — 87624 HPV HI-RISK TYP POOLED RSLT: CPT | Performed by: FAMILY MEDICINE

## 2022-02-16 PROCEDURE — 90471 IMMUNIZATION ADMIN: CPT | Performed by: FAMILY MEDICINE

## 2022-02-16 PROCEDURE — G0145 SCR C/V CYTO,THINLAYER,RESCR: HCPCS | Performed by: FAMILY MEDICINE

## 2022-02-16 RX ORDER — DEXTROAMPHETAMINE SACCHARATE, AMPHETAMINE ASPARTATE MONOHYDRATE, DEXTROAMPHETAMINE SULFATE AND AMPHETAMINE SULFATE 5; 5; 5; 5 MG/1; MG/1; MG/1; MG/1
40 CAPSULE, EXTENDED RELEASE ORAL DAILY
Qty: 60 CAPSULE | Refills: 0 | Status: SHIPPED | OUTPATIENT
Start: 2022-03-18 | End: 2022-06-20

## 2022-02-16 RX ORDER — CITALOPRAM HYDROBROMIDE 40 MG/1
60 TABLET ORAL DAILY
Qty: 135 TABLET | Refills: 3 | Status: SHIPPED | OUTPATIENT
Start: 2022-02-16 | End: 2023-01-31

## 2022-02-16 RX ORDER — DEXTROAMPHETAMINE SACCHARATE, AMPHETAMINE ASPARTATE MONOHYDRATE, DEXTROAMPHETAMINE SULFATE AND AMPHETAMINE SULFATE 5; 5; 5; 5 MG/1; MG/1; MG/1; MG/1
40 CAPSULE, EXTENDED RELEASE ORAL DAILY
Qty: 60 CAPSULE | Refills: 0 | Status: SHIPPED | OUTPATIENT
Start: 2022-02-16 | End: 2022-03-18

## 2022-02-16 RX ORDER — DEXTROAMPHETAMINE SACCHARATE, AMPHETAMINE ASPARTATE MONOHYDRATE, DEXTROAMPHETAMINE SULFATE AND AMPHETAMINE SULFATE 5; 5; 5; 5 MG/1; MG/1; MG/1; MG/1
40 CAPSULE, EXTENDED RELEASE ORAL DAILY
Qty: 60 CAPSULE | Refills: 0 | Status: SHIPPED | OUTPATIENT
Start: 2022-04-17 | End: 2022-06-20

## 2022-02-16 RX ORDER — SPIRONOLACTONE 100 MG/1
100 TABLET, FILM COATED ORAL 2 TIMES DAILY
Qty: 180 TABLET | Refills: 3 | Status: SHIPPED | OUTPATIENT
Start: 2022-02-16 | End: 2023-01-31

## 2022-02-16 RX ORDER — ALPRAZOLAM 0.5 MG
0.5 TABLET ORAL
Qty: 30 TABLET | Refills: 0 | Status: SHIPPED | OUTPATIENT
Start: 2022-02-16 | End: 2023-04-24

## 2022-02-16 ASSESSMENT — ENCOUNTER SYMPTOMS
PARESTHESIAS: 0
NERVOUS/ANXIOUS: 1
FEVER: 0
SORE THROAT: 0
EYE PAIN: 0
MYALGIAS: 0
DYSURIA: 0
PALPITATIONS: 0
HEADACHES: 0
WEAKNESS: 0
SHORTNESS OF BREATH: 0
HEARTBURN: 0
ABDOMINAL PAIN: 0
DIZZINESS: 0
CONSTIPATION: 0
DIARRHEA: 0
ARTHRALGIAS: 0
CHILLS: 0
FREQUENCY: 0
JOINT SWELLING: 0
HEMATOCHEZIA: 0
COUGH: 0
HEMATURIA: 0
NAUSEA: 0
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
## 2022-02-16 NOTE — PATIENT INSTRUCTIONS
1. To lab  I printed for the Adderall for 3 months.  I do require an e-visit in 3 months for refills.    **caution, avoid other medications that can cause QT prolongation (azithomycin Zpak)      Preventive Health Recommendations  Female Ages 40 to 49    Yearly exam:     See your health care provider every year in order to  1. Review health changes.   2. Discuss preventive care.    3. Review your medicines if your doctor prescribed any.      Get a Pap test every three years (unless you have an abnormal result and your provider advises testing more often).      If you get Pap tests with HPV test, you only need to test every 5 years, unless you have an abnormal result. You do not need a Pap test if your uterus was removed (hysterectomy) and you have not had cancer.      You should be tested each year for STDs (sexually transmitted diseases), if you're at risk.     Ask your doctor if you should have a mammogram.      Have a colonoscopy (test for colon cancer) if someone in your family has had colon cancer or polyps before age 50.       Have a cholesterol test every 5 years.       Have a diabetes test (fasting glucose) after age 45. If you are at risk for diabetes, you should have this test every 3 years.    Shots: Get a flu shot each year. Get a tetanus shot every 10 years.     Nutrition:     Eat at least 5 servings of fruits and vegetables each day.    Eat whole-grain bread, whole-wheat pasta and brown rice instead of white grains and rice.    Get adequate Calcium and Vitamin D.      Lifestyle    Exercise at least 150 minutes a week (an average of 30 minutes a day, 5 days a week). This will help you control your weight and prevent disease.    Limit alcohol to one drink per day.    No smoking.     Wear sunscreen to prevent skin cancer.    See your dentist every six months for an exam and cleaning.      This is a preventative visit and any additional concerns or chronic disease management including medication refills  addressed today could be charged additionally.    Preventative visits screen for diseases prior to they occur.  They do not cover for any new diagnosis or chronic disease management.     If you have questions regarding your coverage please check with your insurance provider.  At [facility] we need to code correctly to be in compliance with all insurance companies.

## 2022-02-16 NOTE — LETTER
February 18, 2022      Era Garland  [address]  [place] [address]        Dear ,    We are writing to inform you of your test results.    TSH thyroid hormone is normal.   Cholesterol was just a little high, not too high to need medication.   To improve your cholesterol exercise 30 minutes per day five days a week, increase eating fresh or frozen fruits and vegetables at least 5 per day, increase plant protein instead of animal protein and when you do eat lean meats like fish, and avoid fried foods.   Normal electrolyte and kidney tests.     Resulted Orders   TSH with free T4 reflex   Result Value Ref Range    TSH 1.28 0.40 - 4.00 mU/L   Basic metabolic panel  (Ca, Cl, CO2, Creat, Gluc, K, Na, BUN)   Result Value Ref Range    Sodium 138 133 - 144 mmol/L    Potassium 4.0 3.4 - 5.3 mmol/L    Chloride 104 94 - 109 mmol/L    Carbon Dioxide (CO2) 31 20 - 32 mmol/L    Anion Gap 3 3 - 14 mmol/L    Urea Nitrogen 12 7 - 30 mg/dL    Creatinine 0.78 0.52 - 1.04 mg/dL    Calcium 8.8 8.5 - 10.1 mg/dL    Glucose 93 70 - 99 mg/dL    GFR Estimate >90 >60 mL/min/1.73m2      Comment:      Effective December 21, 2021 eGFRcr in adults is calculated using the 2021 CKD-EPI creatinine equation which includes age and gender (Tamika sterling al., [place], DOI: 10.1056/QODEei8262917)   Lipid panel reflex to direct LDL Non-fasting   Result Value Ref Range    Cholesterol 237 (H) <200 mg/dL    Triglycerides 80 <150 mg/dL    Direct Measure HDL 71 >=50 mg/dL    LDL Cholesterol Calculated 150 (H) <=100 mg/dL    Non HDL Cholesterol 166 (H) <130 mg/dL    Patient Fasting > 8hrs? No     Narrative    Cholesterol  Desirable:  <200 mg/dL    Triglycerides  Normal:  Less than 150 mg/dL  Borderline High:  150-199 mg/dL  High:  200-499 mg/dL  Very High:  Greater than or equal to 500 mg/dL    Direct Measure HDL  Female:  Greater than or equal to 50 mg/dL   Male:  Greater than or equal to 40 mg/dL    LDL Cholesterol  Desirable:   <100mg/dL  Above Desirable:  100-129 mg/dL   Borderline High:  130-159 mg/dL   High:  160-189 mg/dL   Very High:  >= 190 mg/dL    Non HDL Cholesterol  Desirable:  130 mg/dL  Above Desirable:  130-159 mg/dL  Borderline High:  160-189 mg/dL  High:  190-219 mg/dL  Very High:  Greater than or equal to 220 mg/dL       If you have any questions or concerns, please call the clinic at the number listed above.       Sincerely,      Calixto Miranda MD

## 2022-02-21 LAB
BKR LAB AP GYN ADEQUACY: NORMAL
BKR LAB AP GYN INTERPRETATION: NORMAL
BKR LAB AP HPV REFLEX: NORMAL
BKR LAB AP PREVIOUS ABNL DX: NORMAL
BKR LAB AP PREVIOUS ABNORMAL: NORMAL
PATH REPORT.COMMENTS IMP SPEC: NORMAL
PATH REPORT.COMMENTS IMP SPEC: NORMAL
PATH REPORT.RELEVANT HX SPEC: NORMAL

## 2022-02-23 PROBLEM — N87.1 MODERATE DYSPLASIA OF CERVIX (CIN II): Status: ACTIVE | Noted: 2018-02-27

## 2022-02-23 LAB
HUMAN PAPILLOMA VIRUS 16 DNA: NEGATIVE
HUMAN PAPILLOMA VIRUS 18 DNA: NEGATIVE
HUMAN PAPILLOMA VIRUS FINAL DIAGNOSIS: NORMAL
HUMAN PAPILLOMA VIRUS OTHER HR: NEGATIVE

## 2022-03-14 DIAGNOSIS — F90.2 ATTENTION DEFICIT HYPERACTIVITY DISORDER (ADHD), COMBINED TYPE: ICD-10-CM

## 2022-03-14 NOTE — TELEPHONE ENCOUNTER
Routing refill request to provider for review/approval because:  Drug not on the [facility] refill protocol   guanFACINE (INTUNIV) 1 MG TB24 24 hr tablet 90 tablet 0 12/6/2021  No   Sig - Route: Take 1 tablet (1 mg) by mouth At Bedtime Due for a recheck. - Oral

## 2022-03-16 RX ORDER — GUANFACINE 1 MG/1
1 TABLET, EXTENDED RELEASE ORAL AT BEDTIME
Qty: 90 TABLET | Refills: 1 | Status: SHIPPED | OUTPATIENT
Start: 2022-03-16 | End: 2022-08-25

## 2022-05-10 DIAGNOSIS — L70.0 ACNE VULGARIS: ICD-10-CM

## 2022-05-13 RX ORDER — ADAPALENE 0.1 G/100G
CREAM TOPICAL AT BEDTIME
Qty: 45 G | Refills: 0 | OUTPATIENT
Start: 2022-05-13

## 2022-06-15 ENCOUNTER — MYC REFILL (OUTPATIENT)
Dept: FAMILY MEDICINE | Facility: CLINIC | Age: 42
End: 2022-06-15
Payer: COMMERCIAL

## 2022-06-15 DIAGNOSIS — F90.2 ATTENTION DEFICIT HYPERACTIVITY DISORDER (ADHD), COMBINED TYPE: ICD-10-CM

## 2022-06-17 RX ORDER — DEXTROAMPHETAMINE SACCHARATE, AMPHETAMINE ASPARTATE MONOHYDRATE, DEXTROAMPHETAMINE SULFATE AND AMPHETAMINE SULFATE 5; 5; 5; 5 MG/1; MG/1; MG/1; MG/1
40 CAPSULE, EXTENDED RELEASE ORAL DAILY
Qty: 60 CAPSULE | Refills: 0 | OUTPATIENT
Start: 2022-06-17

## 2022-06-17 NOTE — TELEPHONE ENCOUNTER
Pending Prescriptions:                       Disp   Refills    amphetamine-dextroamphetamine (ADDERALL X*60 cap*0            Sig: Take 2 capsules (40 mg) by mouth daily    Routing refill request to provider for review/approval because:  Drug not on the [facility] refill protocol       Venkat Palencia RN

## 2022-06-18 ENCOUNTER — E-VISIT (OUTPATIENT)
Dept: FAMILY MEDICINE | Facility: CLINIC | Age: 42
End: 2022-06-18
Payer: COMMERCIAL

## 2022-06-18 DIAGNOSIS — F90.2 ATTENTION DEFICIT HYPERACTIVITY DISORDER (ADHD), COMBINED TYPE: Primary | ICD-10-CM

## 2022-06-18 PROCEDURE — 99421 OL DIG E/M SVC 5-10 MIN: CPT | Performed by: NURSE PRACTITIONER

## 2022-06-20 RX ORDER — DEXTROAMPHETAMINE SACCHARATE, AMPHETAMINE ASPARTATE MONOHYDRATE, DEXTROAMPHETAMINE SULFATE AND AMPHETAMINE SULFATE 5; 5; 5; 5 MG/1; MG/1; MG/1; MG/1
40 CAPSULE, EXTENDED RELEASE ORAL DAILY
Qty: 60 CAPSULE | Refills: 0 | Status: SHIPPED | OUTPATIENT
Start: 2022-06-20 | End: 2022-07-20

## 2022-06-20 RX ORDER — DEXTROAMPHETAMINE SACCHARATE, AMPHETAMINE ASPARTATE MONOHYDRATE, DEXTROAMPHETAMINE SULFATE AND AMPHETAMINE SULFATE 5; 5; 5; 5 MG/1; MG/1; MG/1; MG/1
40 CAPSULE, EXTENDED RELEASE ORAL DAILY
Qty: 60 CAPSULE | Refills: 0 | Status: SHIPPED | OUTPATIENT
Start: 2022-08-21 | End: 2022-09-20

## 2022-06-20 RX ORDER — DEXTROAMPHETAMINE SACCHARATE, AMPHETAMINE ASPARTATE MONOHYDRATE, DEXTROAMPHETAMINE SULFATE AND AMPHETAMINE SULFATE 5; 5; 5; 5 MG/1; MG/1; MG/1; MG/1
40 CAPSULE, EXTENDED RELEASE ORAL DAILY
Qty: 60 CAPSULE | Refills: 0 | Status: SHIPPED | OUTPATIENT
Start: 2022-07-21 | End: 2022-08-20

## 2022-06-20 NOTE — PATIENT INSTRUCTIONS
Thank you for choosing us for your care. I have placed an order for a prescription so that you can start treatment. View your full visit summary for details by clicking on the link below. Your pharmacist will able to address any questions you may have about the medication.     If you're not feeling better within 5-7 days, please schedule an appointment.  You can schedule an appointment right here in [facility], or call [phone]  If the visit is for the same symptoms as your eVisit, we'll refund the cost of your eVisit if seen within seven days.

## 2022-08-25 DIAGNOSIS — F90.2 ATTENTION DEFICIT HYPERACTIVITY DISORDER (ADHD), COMBINED TYPE: ICD-10-CM

## 2022-08-25 RX ORDER — GUANFACINE 1 MG/1
TABLET, EXTENDED RELEASE ORAL
Qty: 90 TABLET | Refills: 1 | Status: SHIPPED | OUTPATIENT
Start: 2022-08-25

## 2022-11-20 ENCOUNTER — HEALTH MAINTENANCE LETTER (OUTPATIENT)
Age: 42
End: 2022-11-20

## 2022-11-22 ENCOUNTER — HOSPITAL ENCOUNTER (OUTPATIENT)
Dept: MAMMOGRAPHY | Facility: CLINIC | Age: 42
Discharge: HOME | End: 2022-11-22
Attending: OBSTETRICS & GYNECOLOGY | Admitting: OBSTETRICS & GYNECOLOGY
Payer: COMMERCIAL

## 2022-11-22 DIAGNOSIS — Z12.31 VISIT FOR SCREENING MAMMOGRAM: ICD-10-CM

## 2022-11-22 PROCEDURE — 77067 SCR MAMMO BI INCL CAD: CPT

## 2023-01-10 DIAGNOSIS — F33.1 MAJOR DEPRESSIVE DISORDER, RECURRENT EPISODE, MODERATE (H): ICD-10-CM

## 2023-01-10 RX ORDER — BUPROPION HYDROCHLORIDE 300 MG/1
300 TABLET ORAL EVERY MORNING
Qty: 90 TABLET | Refills: 3 | Status: CANCELLED | OUTPATIENT
Start: 2023-01-10

## 2023-01-10 NOTE — TELEPHONE ENCOUNTER
"Requested Prescriptions   Pending Prescriptions Disp Refills     buPROPion (WELLBUTRIN XL) 300 MG 24 hr tablet 90 tablet 3     Sig: Take 1 tablet (300 mg) by mouth every morning       SSRIs Protocol Failed - 1/10/2023  9:01 AM        Failed - PHQ-9 score less than 5 in past 6 months     Please review last PHQ-9 score.           Failed - Recent (6 mo) or future (30 days) visit within the authorizing provider's specialty     Patient had office visit in the last 6 months or has a visit in the next 30 days with authorizing provider or within the authorizing provider's specialty.  See \"Patient Info\" tab in inbasket, or \"Choose Columns\" in Meds & Orders section of the refill encounter.            Passed - Medication is Bupropion     If the medication is Bupropion (Wellbutrin), and the patient is taking for smoking cessation; OK to refill.          Passed - Medication is active on med list        Passed - Patient is age 18 or older        Passed - No active pregnancy on record        Passed - No positive pregnancy test in last 12 months           Pt was last seen by a [facility] [facility] provider was on 9/11/2020, over 2 years ago.  Provider that last prescribed this has since retired.  Pt has had a yearly physical with a FP provider back in February, 2022.    Sending pt a [employer] message to have be seen by a family practice provider for continued management of her bupropion.    Anabell Barroso RN      "

## 2023-01-10 NOTE — TELEPHONE ENCOUNTER
Received refill request for buPROPion (WELLBUTRIN XL) 300 MG 24 hr tablet   From [facility] Fax # [phone]      Debra Faith   [facility] [place]   [facility]  [phone]

## 2023-01-23 ENCOUNTER — TELEPHONE (OUTPATIENT)
Dept: FAMILY MEDICINE | Facility: CLINIC | Age: 43
End: 2023-01-23
Payer: COMMERCIAL

## 2023-01-23 NOTE — LETTER
January 30, 2023      Era Garland  [address]  [place] [address]        Dear Era,     Your healthcare team cares about your health. To provide you with the best care, we have reviewed your chart and based on our findings, we see that you are due for:   An Asthma Control Test (ACT) that our clinic uses to monitor and manage your asthma. This test is an assessment tool that we use to determine how well your asthma is controlled.  Please complete the enclosed form and return in the provided envelope.  Thank you for choosing [facility] for your healthcare needs. For any questions, concerns, or to schedule an appointment please contact the clinic.   Healthy Regards,    Your [facility] Care Team

## 2023-01-23 NOTE — TELEPHONE ENCOUNTER
Patient Quality Outreach    Patient is due for the following:   Asthma  -  ACT needed    Next Steps:   complete act    Type of outreach:    Sent [employer] message.   Will postpone x 1 week, if not viewed or completed please mail ACT.         Questions for provider review:    None     Heidy Boyer, CMA

## 2023-01-30 NOTE — TELEPHONE ENCOUNTER
Patient Quality Outreach    Patient is due for the following:   Asthma  -  ACT needed    Next Steps:       Type of outreach:    [email or web address] message has not been viewed, letter mailed with ACT      Questions for provider review:    None     Heidy Boyer, CMA

## 2023-03-22 ENCOUNTER — HOSPITAL ENCOUNTER (EMERGENCY)
Facility: CLINIC | Age: 43
Discharge: HOME | End: 2023-03-22
Attending: EMERGENCY MEDICINE | Admitting: EMERGENCY MEDICINE
Payer: COMMERCIAL

## 2023-03-22 VITALS
HEIGHT: 71 IN | RESPIRATION RATE: 14 BRPM | DIASTOLIC BLOOD PRESSURE: 61 MMHG | WEIGHT: 145 LBS | HEART RATE: 79 BPM | TEMPERATURE: 97.9 F | OXYGEN SATURATION: 98 % | SYSTOLIC BLOOD PRESSURE: 100 MMHG | BODY MASS INDEX: 20.3 KG/M2

## 2023-03-22 DIAGNOSIS — R51.9 ACUTE NONINTRACTABLE HEADACHE, UNSPECIFIED HEADACHE TYPE: ICD-10-CM

## 2023-03-22 PROCEDURE — 258N000003 HC RX IP 258 OP 636: Performed by: EMERGENCY MEDICINE

## 2023-03-22 PROCEDURE — 99284 EMERGENCY DEPT VISIT MOD MDM: CPT | Performed by: EMERGENCY MEDICINE

## 2023-03-22 PROCEDURE — 250N000011 HC RX IP 250 OP 636: Performed by: EMERGENCY MEDICINE

## 2023-03-22 PROCEDURE — 99284 EMERGENCY DEPT VISIT MOD MDM: CPT | Mod: 25 | Performed by: EMERGENCY MEDICINE

## 2023-03-22 PROCEDURE — 96374 THER/PROPH/DIAG INJ IV PUSH: CPT | Performed by: EMERGENCY MEDICINE

## 2023-03-22 PROCEDURE — 96361 HYDRATE IV INFUSION ADD-ON: CPT | Performed by: EMERGENCY MEDICINE

## 2023-03-22 PROCEDURE — 250N000011 HC RX IP 250 OP 636: Performed by: PHYSICIAN ASSISTANT

## 2023-03-22 RX ORDER — DROPERIDOL 2.5 MG/ML
2.5 INJECTION, SOLUTION INTRAMUSCULAR; INTRAVENOUS ONCE
Status: COMPLETED | OUTPATIENT
Start: 2023-03-22 | End: 2023-03-22

## 2023-03-22 RX ORDER — ONDANSETRON 4 MG/1
4 TABLET, ORALLY DISINTEGRATING ORAL ONCE
Status: DISCONTINUED | OUTPATIENT
Start: 2023-03-22 | End: 2023-03-22

## 2023-03-22 RX ORDER — ZOLMITRIPTAN 5 MG/1
5 TABLET, FILM COATED ORAL
Qty: 6 TABLET | Refills: 0 | Status: SHIPPED | OUTPATIENT
Start: 2023-03-22 | End: 2023-04-24

## 2023-03-22 RX ORDER — ONDANSETRON 4 MG/1
4 TABLET, ORALLY DISINTEGRATING ORAL ONCE
Status: COMPLETED | OUTPATIENT
Start: 2023-03-22 | End: 2023-03-22

## 2023-03-22 RX ADMIN — SODIUM CHLORIDE, POTASSIUM CHLORIDE, SODIUM LACTATE AND CALCIUM CHLORIDE 500 ML: 600; 310; 30; 20 INJECTION, SOLUTION INTRAVENOUS at 18:09

## 2023-03-22 RX ADMIN — ONDANSETRON 4 MG: 4 TABLET, ORALLY DISINTEGRATING ORAL at 15:43

## 2023-03-22 RX ADMIN — DROPERIDOL 2.5 MG: 2.5 INJECTION, SOLUTION INTRAMUSCULAR; INTRAVENOUS at 18:09

## 2023-03-22 ASSESSMENT — ACTIVITIES OF DAILY LIVING (ADL)
ADLS_ACUITY_SCORE: 35
ADLS_ACUITY_SCORE: 35

## 2023-03-22 NOTE — DISCHARGE INSTRUCTIONS
Try to stay well hydrated at home. Please return to the emergency department if you develop worsening of your headache or a new headache which is severe, associated with vision changes, associated with neck stiffness or fever, or if it is different from any other headache that you have had before. Return to the emergency department if you develop numbness, weakness or tingling or problems with coordination, or if you develop severe nausea and vomiting and are unable to keep down fluids at home.

## 2023-03-22 NOTE — ED TRIAGE NOTES
Pt with a h/o migraines, doesn not currently have her medications and hasn't had one for a long time.Pt awoke with HA and took Ibuprofen  and sudafed and flonase at 6am, then 1pm more ibuprofen 600mg.  Pt started vomiting at 2:30 PM.      Pt hasn't taken her Adderal for a couple weeks.

## 2023-03-22 NOTE — ED NOTES
Patient presents today for severe headache, vomiting and nausea.  states they presented to the emergency department check-in desk but were directed to the urgent care.  Patient is visibly uncomfortable and was informed that we cannot do IV fluids or medications here in the urgent care.  Directed patient be seen in the ER for further evaluation and management.  Patient is in agreement with this plan since she is fairly uncomfortable.  I did inform patient that I would order an oral Zofran in triage while she is waiting to be seen in the emergency department. Patient sent to Emergency Room directly from urgent care.     RYANNE Eduardo Jenna L, PA-C  03/22/23 1212

## 2023-03-22 NOTE — ED PROVIDER NOTES
History     Chief Complaint   Patient presents with     Headache     HPI  Era Garland is a 42 year old female with history of migraine headaches who presents emergency department for headache which feels typical for her migraine which started this morning when she awoke.  States that she has had migraines in the past and used to have medications does not recall the name but has not had a headache in several years and cleaned out her medicine cabinet and no longer has migraine headache medicine.  She took ibuprofen this morning at 645 and again at 1 PM.  Headache started in the left frontal and has now bilaterally frontal and on the crown of her head.  No fevers.  No visual changes.  No hearing changes.  No neck pain.  She does have associated photophobia, phonophobia, nausea and vomiting.  No abdominal pain.  No sore throat.  No nasal congestion.    The patient's PMHx, Surgical Hx, Allergies, and Medications were all reviewed with the patient.    Allergies:  Allergies   Allergen Reactions     Augmentin Diarrhea     Diflucan Blisters and Rash       Problem List:    Patient Active Problem List    Diagnosis Date Noted     Major depressive disorder, recurrent episode, moderate (H) 01/19/2021     Priority: Medium     Attention deficit hyperactivity disorder (ADHD), combined type 04/15/2020     Priority: Medium     Moderate dysplasia of cervix (RAYMON II) 02/27/2018     Priority: Medium     LEEP at age 24 - not sure of results  2/16/18 Pap: LSIL, +HR HPV (neg 16/18). Plan Purdys due by 5/16/18 4/12/18: Colpo Bx - RAYMON 1 & RAYMON 2. --recommend LEEP  5/17/18: LEEP: mostly RAYMON 1, small focus RAYMON 2, Neg margins.--Pap and HPV 1 year.   6/5/19 NIL Pap, Neg HPV. Plan cotest in 1 year.   9/11/20 NIL Pap, Neg HPV. Plan cotest in 1 year.   10/27/21 Lost to follow-up for pap tracking  2/16/22 NIL pap, neg HPV. Plan: cotest every 3 years         IUD (intrauterine device) in place 02/10/2015     Priority: Medium     Mirena IUD placed  2/10/2015  String cut short during LEEP procedure         Moderate major depression (H) 01/07/2013     Priority: Medium     Has been on Celexa 40mg in past; changed to Zoloft without good control in pregnancy so changed back to Celexa  In counseling as well both solo and marital  Added Remeron 15mg as depression not well controlled; felt worse on Remeron, so changed to Cymbalta  Back to Celexa 40mg due to insurance reasons 2/14  Added Wellbutrin 2/15       Lactose intolerance 11/16/2012     Priority: Medium     Intermittent asthma      Priority: Medium        Past Medical History:    Past Medical History:   Diagnosis Date     Anxiety      Chickenpox      Depression      GERD (gastroesophageal reflux disease)      HPV (human papilloma virus) infection      IBS (irritable bowel syndrome)      Intermittent asthma      Papanicolaou smear of cervix with low grade squamous intraepithelial lesion (LGSIL) 02/27/2018     S/P LEEP of cervix age 24     Urinary tract infections        Past Surgical History:    Past Surgical History:   Procedure Laterality Date     LAPAROSCOPIC APPENDECTOMY  6/20/2012    Procedure: LAPAROSCOPIC APPENDECTOMY;  Laparoscopic Appendectomy;  Surgeon: Jorge Luis Tan MD;  Location: [facility] OR     LEEP TX, CERVICAL  age 24     MOUTH SURGERY      wisdom teeth       Family History:    Family History   Problem Relation Age of Onset     Depression Mother      Gastrointestinal Disease Mother      Allergies Father      Eye Disorder Father      Hypertension Father      Parkinsonism Father      Osteoporosis Paternal Grandmother      Hypertension Paternal Grandmother      Alzheimer Disease Paternal Grandfather      Asthma Brother      Depression Brother      Cardiovascular Maternal Grandfather         MI     Alcohol/Drug Maternal Grandmother        Social History:  Marital Status:   [4]  Social History     Tobacco Use     Smoking status: Never     Smokeless tobacco: Never   Vaping Use     Vaping Use:  "Never used   Substance Use Topics     Alcohol use: Yes     Comment: 3 drinks weekly- quit with pregnancy     Drug use: No        Medications:    ZOLMitriptan (ZOMIG) 5 MG tablet  adapalene (DIFFERIN) 0.1 % external cream  albuterol (PROAIR HFA/PROVENTIL HFA/VENTOLIN HFA) 108 (90 Base) MCG/ACT inhaler  ALPRAZolam (XANAX) 0.5 MG tablet  Ascorbic Acid (VITAMIN C PO)  buPROPion (WELLBUTRIN XL) 300 MG 24 hr tablet  calcium carbonate-vitamin D 600-400 MG-UNIT CHEW  Cholecalciferol (VITAMIN D3 PO)  citalopram (CELEXA) 40 MG tablet  guanFACINE (INTUNIV) 1 MG TB24 24 hr tablet  ibuprofen (ADVIL,MOTRIN) 400 MG tablet  Lactobacillus (ACIDOPHILUS PO)  LANsoprazole (PREVACID) 30 MG DR capsule  levonorgestrel (MIRENA) 20 MCG/24HR IUD  Pseudoephedrine HCl (SUDAFED 12 HOUR PO)  spironolactone (ALDACTONE) 100 MG tablet          Review of Systems  Pertinent positives and negatives mentioned in HPI    Physical Exam   BP: 98/65  Pulse: 77  Temp: 97.9  F (36.6  C)  Resp: 14  Height: 180.3 cm (5' 11\")  Weight: 65.8 kg (145 lb)  SpO2: 100 %    Physical Exam  GEN: Lying in a darkened room with ice pack over her eyes.  Appears distressed  HENT: MMM. External ears and nose normal bilaterally.  Atraumatic.  EYES: EOM intact. Conjunctiva clear. No discharge.  No RAPD.  No nystagmus.  NECK: Symmetric, freely mobile.  Nontender  CV : Extremities warm well perfused  PULM: Normal effort.  Speaking in full sentences   NEURO: Mental status: Alert, awake. Oriented to self, date, and place. Normal speech and language. GCS 15  Cranial Nerves: II-XII grossly intact  Motor: Follows commands x 4 extremities   Upper Extremities:   RUE: 5/5 shoulder abduction. 5/5 elbow flex/ext. 5/5 wrist flex/ext. 5/5 hand .   LUE: 5/5 shoulder abduction. 5/5 elbow flex/ext. 5/5 wrist flex/ext. 5/5 hand .    Lower Extremities:   RLE: 5/5 hip flexion. 5/5 knee flex/ext. 5/5 ankle plantar-/dorsiflexion. 5/5 EHL.   LLE: 5/5 hip flexion. 5/5 knee flex/ext. 5/5 ankle " plantar-/dorsiflexion. 5/5 EHL   Sensory: Sensation intact in all 4 extremities   Coordination: Finger-nose finger intact.          ED Course        Procedures         EKG: Interpreted by myself.  Sinus rhythm with rate of 77 bpm.  Normal QT,.  Normal NE.  No ectopy.  No ST segment elevations or depressions.  No prior EKG for comparison.  Impression normal EKG with normal QT segment.    Critical Care time:  none               No results found for this or any previous visit (from the past 24 hour(s)).    Medications   ondansetron (ZOFRAN ODT) ODT tab 4 mg (4 mg Oral $Given 3/22/23 1544)   droperidol (INAPSINE) injection 2.5 mg (2.5 mg Intravenous $Given 3/22/23 1305)   lactated ringers BOLUS 500 mL (0 mLs Intravenous Stopped 3/22/23 0877)       Assessments & Plan (with Medical Decision Making)   42 year old female with past medical history of migraine headaches with 1 day of resolution in headache typical of her migraines with associated nausea, vomiting, photophobia and phonophobia.  Symptoms have progressed throughout the day and not maximal at onset.  Low suspicion for intracranial hemorrhage do not feel imaging necessary.  Reassuring neurologic exam pain in temples signs of giant cell arteritis. No meningeal signs. Non positional.  Plan to treat symptomatically initially with 2.5 mg IV droperidol after screening EKG.    EKG with normal QT interval.  On reevaluation headache had resolved and the patient would like to go home.  Given her reassuring evaluation and resolution of symptoms I think is appropriate.  Patient says she was on zolmitriptan previously for headaches.  A prescription for this medication was sent to her preferred pharmacy.  Follow-up and ED return precautions discussed.  Discharged in improved condition.            I have reviewed the nursing notes.         New Prescriptions    ZOLMITRIPTAN (ZOMIG) 5 MG TABLET    Take 1 tablet (5 mg) by mouth at onset of headache for migraine May repeat in 2  hours. Max 2 tablets/24 hours.       Final diagnoses:   Acute nonintractable headache, unspecified headache type     Juan Alberto Rosario MD    3/22/2023   [facility] EMERGENCY DEPT    Disclaimer: This note consists of words and symbols derived from keyboarding and dictation using voice recognition software.  As a result, there may be errors that have gone undetected.  Please consider this when interpreting information found in this note.             Juan Alberto Rosario MD  03/22/23 6502

## 2023-03-22 NOTE — ED NOTES
Migraine that started this morning.  Pt states she was dry heaving in the waiting room.  Zofran did help her nausea.  She has a dry mouth.

## 2023-03-22 NOTE — ED NOTES
Pt feels better and would yves to go home.  She no longer has an ice pack on her face and has her eyes open.  Md informed.

## 2023-04-15 ENCOUNTER — HEALTH MAINTENANCE LETTER (OUTPATIENT)
Age: 43
End: 2023-04-15

## 2023-04-21 ASSESSMENT — ASTHMA QUESTIONNAIRES
QUESTION_1 LAST FOUR WEEKS HOW MUCH OF THE TIME DID YOUR ASTHMA KEEP YOU FROM GETTING AS MUCH DONE AT WORK, SCHOOL OR AT HOME: NONE OF THE TIME
QUESTION_4 LAST FOUR WEEKS HOW OFTEN HAVE YOU USED YOUR RESCUE INHALER OR NEBULIZER MEDICATION (SUCH AS ALBUTEROL): ONCE A WEEK OR LESS
QUESTION_5 LAST FOUR WEEKS HOW WOULD YOU RATE YOUR ASTHMA CONTROL: SOMEWHAT CONTROLLED
QUESTION_3 LAST FOUR WEEKS HOW OFTEN DID YOUR ASTHMA SYMPTOMS (WHEEZING, COUGHING, SHORTNESS OF BREATH, CHEST TIGHTNESS OR PAIN) WAKE YOU UP AT NIGHT OR EARLIER THAN USUAL IN THE MORNING: NOT AT ALL
ACT_TOTALSCORE: 21
ACT_TOTALSCORE: 21
QUESTION_2 LAST FOUR WEEKS HOW OFTEN HAVE YOU HAD SHORTNESS OF BREATH: ONCE OR TWICE A WEEK

## 2023-04-21 ASSESSMENT — PATIENT HEALTH QUESTIONNAIRE - PHQ9
SUM OF ALL RESPONSES TO PHQ QUESTIONS 1-9: 15
SUM OF ALL RESPONSES TO PHQ QUESTIONS 1-9: 15
10. IF YOU CHECKED OFF ANY PROBLEMS, HOW DIFFICULT HAVE THESE PROBLEMS MADE IT FOR YOU TO DO YOUR WORK, TAKE CARE OF THINGS AT HOME, OR GET ALONG WITH OTHER PEOPLE: VERY DIFFICULT

## 2023-04-21 ASSESSMENT — ENCOUNTER SYMPTOMS
HEARTBURN: 1
DIARRHEA: 0
DYSURIA: 0
BREAST MASS: 1
NERVOUS/ANXIOUS: 1
FEVER: 0
JOINT SWELLING: 0
SHORTNESS OF BREATH: 0
WEAKNESS: 0
EYE PAIN: 0
HEMATURIA: 0
HEMATOCHEZIA: 0
SORE THROAT: 0
DIZZINESS: 0
FREQUENCY: 0
HEADACHES: 1
CONSTIPATION: 1
NAUSEA: 1
CHILLS: 0
MYALGIAS: 0
ARTHRALGIAS: 1
PALPITATIONS: 0
COUGH: 0
PARESTHESIAS: 0
ABDOMINAL PAIN: 0

## 2023-04-24 ENCOUNTER — OFFICE VISIT (OUTPATIENT)
Dept: FAMILY MEDICINE | Facility: CLINIC | Age: 43
End: 2023-04-24
Payer: COMMERCIAL

## 2023-04-24 ENCOUNTER — TELEPHONE (OUTPATIENT)
Dept: FAMILY MEDICINE | Facility: CLINIC | Age: 43
End: 2023-04-24

## 2023-04-24 VITALS
HEART RATE: 98 BPM | RESPIRATION RATE: 20 BRPM | BODY MASS INDEX: 21.35 KG/M2 | HEIGHT: 70 IN | TEMPERATURE: 98.9 F | WEIGHT: 149.13 LBS | SYSTOLIC BLOOD PRESSURE: 98 MMHG | DIASTOLIC BLOOD PRESSURE: 60 MMHG | OXYGEN SATURATION: 98 %

## 2023-04-24 DIAGNOSIS — F33.0 MILD EPISODE OF RECURRENT MAJOR DEPRESSIVE DISORDER (H): ICD-10-CM

## 2023-04-24 DIAGNOSIS — J45.20 MILD INTERMITTENT ASTHMA WITHOUT COMPLICATION: ICD-10-CM

## 2023-04-24 DIAGNOSIS — G43.101 MIGRAINE WITH AURA AND WITH STATUS MIGRAINOSUS, NOT INTRACTABLE: ICD-10-CM

## 2023-04-24 DIAGNOSIS — K21.9 GASTROESOPHAGEAL REFLUX DISEASE WITHOUT ESOPHAGITIS: ICD-10-CM

## 2023-04-24 DIAGNOSIS — Z13.220 LIPID SCREENING: ICD-10-CM

## 2023-04-24 DIAGNOSIS — F33.8 SEASONAL AFFECTIVE DISORDER (H): ICD-10-CM

## 2023-04-24 DIAGNOSIS — L70.0 ACNE VULGARIS: ICD-10-CM

## 2023-04-24 DIAGNOSIS — Z00.00 ROUTINE GENERAL MEDICAL EXAMINATION AT A HEALTH CARE FACILITY: Primary | ICD-10-CM

## 2023-04-24 DIAGNOSIS — F33.1 MAJOR DEPRESSIVE DISORDER, RECURRENT EPISODE, MODERATE (H): ICD-10-CM

## 2023-04-24 DIAGNOSIS — Z11.59 NEED FOR HEPATITIS C SCREENING TEST: ICD-10-CM

## 2023-04-24 LAB
ANION GAP SERPL CALCULATED.3IONS-SCNC: 9 MMOL/L (ref 7–15)
BUN SERPL-MCNC: 10.9 MG/DL (ref 6–20)
CALCIUM SERPL-MCNC: 9.8 MG/DL (ref 8.6–10)
CHLORIDE SERPL-SCNC: 101 MMOL/L (ref 98–107)
CHOLEST SERPL-MCNC: 275 MG/DL
CREAT SERPL-MCNC: 0.83 MG/DL (ref 0.51–0.95)
DEPRECATED HCO3 PLAS-SCNC: 30 MMOL/L (ref 22–29)
GFR SERPL CREATININE-BSD FRML MDRD: 90 ML/MIN/1.73M2
GLUCOSE SERPL-MCNC: 98 MG/DL (ref 70–99)
HDLC SERPL-MCNC: 77 MG/DL
LDLC SERPL CALC-MCNC: 183 MG/DL
MAGNESIUM SERPL-MCNC: 1.8 MG/DL (ref 1.7–2.3)
NONHDLC SERPL-MCNC: 198 MG/DL
POTASSIUM SERPL-SCNC: 4.6 MMOL/L (ref 3.4–5.3)
SODIUM SERPL-SCNC: 140 MMOL/L (ref 136–145)
TRIGL SERPL-MCNC: 77 MG/DL

## 2023-04-24 PROCEDURE — 80061 LIPID PANEL: CPT | Performed by: NURSE PRACTITIONER

## 2023-04-24 PROCEDURE — 83735 ASSAY OF MAGNESIUM: CPT | Performed by: NURSE PRACTITIONER

## 2023-04-24 PROCEDURE — 80048 BASIC METABOLIC PNL TOTAL CA: CPT | Performed by: NURSE PRACTITIONER

## 2023-04-24 PROCEDURE — 99396 PREV VISIT EST AGE 40-64: CPT | Performed by: NURSE PRACTITIONER

## 2023-04-24 PROCEDURE — 36415 COLL VENOUS BLD VENIPUNCTURE: CPT | Performed by: NURSE PRACTITIONER

## 2023-04-24 PROCEDURE — 86803 HEPATITIS C AB TEST: CPT | Performed by: NURSE PRACTITIONER

## 2023-04-24 PROCEDURE — 99214 OFFICE O/P EST MOD 30 MIN: CPT | Mod: 25 | Performed by: NURSE PRACTITIONER

## 2023-04-24 RX ORDER — DEXTROAMPHETAMINE SACCHARATE, AMPHETAMINE ASPARTATE, DEXTROAMPHETAMINE SULFATE AND AMPHETAMINE SULFATE 1.25; 1.25; 1.25; 1.25 MG/1; MG/1; MG/1; MG/1
TABLET ORAL
COMMUNITY
Start: 2023-03-23

## 2023-04-24 RX ORDER — ALBUTEROL SULFATE 90 UG/1
2 AEROSOL, METERED RESPIRATORY (INHALATION) EVERY 6 HOURS PRN
Qty: 18 G | Refills: 3 | Status: SHIPPED | OUTPATIENT
Start: 2023-04-24 | End: 2024-05-23

## 2023-04-24 RX ORDER — ALPRAZOLAM 0.5 MG
0.5 TABLET ORAL
Qty: 30 TABLET | Refills: 0 | Status: SHIPPED | OUTPATIENT
Start: 2023-04-24

## 2023-04-24 RX ORDER — DEXTROAMPHETAMINE SACCHARATE, AMPHETAMINE ASPARTATE MONOHYDRATE, DEXTROAMPHETAMINE SULFATE AND AMPHETAMINE SULFATE 5; 5; 5; 5 MG/1; MG/1; MG/1; MG/1
CAPSULE, EXTENDED RELEASE ORAL
COMMUNITY
Start: 2023-04-20

## 2023-04-24 RX ORDER — LANSOPRAZOLE 30 MG/1
30 CAPSULE, DELAYED RELEASE ORAL 2 TIMES DAILY
Qty: 180 CAPSULE | Refills: 3 | Status: SHIPPED | OUTPATIENT
Start: 2023-04-24 | End: 2024-05-23

## 2023-04-24 RX ORDER — SPIRONOLACTONE 100 MG/1
100 TABLET, FILM COATED ORAL 2 TIMES DAILY
Qty: 180 TABLET | Refills: 3 | Status: SHIPPED | OUTPATIENT
Start: 2023-04-24 | End: 2024-05-23

## 2023-04-24 RX ORDER — BUPROPION HYDROCHLORIDE 300 MG/1
300 TABLET ORAL EVERY MORNING
Qty: 90 TABLET | Refills: 3 | Status: SHIPPED | OUTPATIENT
Start: 2023-04-24 | End: 2024-04-29

## 2023-04-24 RX ORDER — CITALOPRAM HYDROBROMIDE 40 MG/1
60 TABLET ORAL DAILY
Qty: 135 TABLET | Refills: 0 | Status: CANCELLED | OUTPATIENT
Start: 2023-04-24

## 2023-04-24 RX ORDER — ZOLMITRIPTAN 5 MG/1
5 TABLET, FILM COATED ORAL
Qty: 6 TABLET | Refills: 0 | Status: SHIPPED | OUTPATIENT
Start: 2023-04-24 | End: 2024-05-23

## 2023-04-24 RX ORDER — ADAPALENE 0.1 G/100G
CREAM TOPICAL AT BEDTIME
Qty: 45 G | Refills: 11 | Status: SHIPPED | OUTPATIENT
Start: 2023-04-24 | End: 2024-05-23

## 2023-04-24 ASSESSMENT — ENCOUNTER SYMPTOMS
WEAKNESS: 0
COUGH: 0
DYSURIA: 0
FEVER: 0
ABDOMINAL PAIN: 0
HEMATURIA: 0
DIZZINESS: 0
SHORTNESS OF BREATH: 0
EYE PAIN: 0
JOINT SWELLING: 0
NERVOUS/ANXIOUS: 1
NAUSEA: 1
PARESTHESIAS: 0
ARTHRALGIAS: 1
BREAST MASS: 1
CONSTIPATION: 1
MYALGIAS: 0
DIARRHEA: 0
PALPITATIONS: 0
HEMATOCHEZIA: 0
SORE THROAT: 0
HEARTBURN: 1
CHILLS: 0
HEADACHES: 1
FREQUENCY: 0

## 2023-04-24 ASSESSMENT — PAIN SCALES - GENERAL: PAINLEVEL: NO PAIN (0)

## 2023-04-24 NOTE — PATIENT INSTRUCTIONS
Lab today.  You will be notified in MyChart of results.    Preventive Health Recommendations  Female Ages 40 to 49    Yearly exam:   See your health care provider every year in order to  Review health changes.   Discuss preventive care.    Review your medicines if your doctor prescribed any.    Get a Pap test every three years (unless you have an abnormal result and your provider advises testing more often).    If you get Pap tests with HPV test, you only need to test every 5 years, unless you have an abnormal result. You do not need a Pap test if your uterus was removed (hysterectomy) and you have not had cancer.    You should be tested each year for STDs (sexually transmitted diseases), if you're at risk.   Ask your doctor if you should have a mammogram.    Have a colonoscopy (test for colon cancer) if someone in your family has had colon cancer or polyps before age 50.     Have a cholesterol test every 5 years.     Have a diabetes test (fasting glucose) after age 45. If you are at risk for diabetes, you should have this test every 3 years.    Shots: Get a flu shot each year. Get a tetanus shot every 10 years.     Nutrition:   Eat at least 5 servings of fruits and vegetables each day.  Eat whole-grain bread, whole-wheat pasta and brown rice instead of white grains and rice.  Get adequate Calcium and Vitamin D.      Lifestyle  Exercise at least 150 minutes a week (an average of 30 minutes a day, 5 days a week). This will help you control your weight and prevent disease.  Limit alcohol to one drink per day.  No smoking.   Wear sunscreen to prevent skin cancer.  See your dentist every six months for an exam and cleaning.

## 2023-04-24 NOTE — LETTER
My Asthma Action Plan    Name: Era Garland   YOB: 1980  Date: 4/24/2023   My doctor: Nyasia Davila NP   My clinic: [facility]        My Rescue Medicine:   Albuterol inhaler (Proair/Ventolin/Proventil HFA)  2-4 puffs EVERY 4 HOURS as needed. Use a spacer if recommended by your provider.   My Asthma Severity:   Intermittent / Exercise Induced  Know your asthma triggers: exercise or sports  upper respiratory infections          GREEN ZONE   Good Control  I feel good  No cough or wheeze  Can work, sleep and play without asthma symptoms       Take your asthma control medicine every day.     If exercise triggers your asthma, take your rescue medication  15 minutes before exercise or sports, and  During exercise if you have asthma symptoms  Spacer to use with inhaler: If you have a spacer, make sure to use it with your inhaler             YELLOW ZONE Getting Worse  I have ANY of these:  I do not feel good  Cough or wheeze  Chest feels tight  Wake up at night   Keep taking your Green Zone medications  Start taking your rescue medicine:  every 20 minutes for up to 1 hour. Then every 4 hours for 24-48 hours.  If you stay in the Yellow Zone for more than 12-24 hours, contact your doctor.  If you do not return to the Green Zone in 12-24 hours or you get worse, start taking your oral steroid medicine if prescribed by your provider.           RED ZONE Medical Alert - Get Help  I have ANY of these:  I feel awful  Medicine is not helping  Breathing getting harder  Trouble walking or talking  Nose opens wide to breathe       Take your rescue medicine NOW  If your provider has prescribed an oral steroid medicine, start taking it NOW  Call your doctor NOW  If you are still in the Red Zone after 20 minutes and you have not reached your doctor:  Take your rescue medicine again and  Call 911 or go to the emergency room right away    See your regular doctor within 2 weeks of an Emergency Room  or Urgent Care visit for follow-up treatment.          Annual Reminders:  Meet with Asthma Educator,  Flu Shot in the Fall, consider Pneumonia Vaccination for patients with asthma (aged 19 and older).    Pharmacy:    [id number] [address] [id number] - [place], [place] - [address] AT [address]    Electronically signed by Nyasia Davila NP   Date: 04/24/23                    Asthma Triggers  How To Control Things That Make Your Asthma Worse    Triggers are things that make your asthma worse.  Look at the list below to help you find your triggers and   what you can do about them. You can help prevent asthma flare-ups by staying away from your triggers.      Trigger                                                          What you can do   Cigarette Smoke  Tobacco smoke can make asthma worse. Do not allow smoking in your home, car or around you.  Be sure no one smokes at a child s day care or school.  If you smoke, ask your health care provider for ways to help you quit.  Ask family members to quit too.  Ask your health care provider for a referral to Quit Plan to help you quit smoking, or call 7-803-033-PLAN.     Colds, Flu, Bronchitis  These are common triggers of asthma. Wash your hands often.  Don t touch your eyes, nose or mouth.  Get a flu shot every year.     Dust Mites  These are tiny bugs that live in cloth or carpet. They are too small to see. Wash sheets and blankets in hot water every week.   Encase pillows and mattress in dust mite proof covers.  Avoid having carpet if you can. If you have carpet, vacuum weekly.   Use a dust mask and HEPA vacuum.   Pollen and Outdoor Mold  Some people are allergic to trees, grass, or weed pollen, or molds. Try to keep your windows closed.  Limit time out doors when pollen count is high.   Ask you health care provider about taking medicine during allergy season.     Animal Dander  Some people  are allergic to skin flakes, urine or saliva from pets with fur or feathers. Keep pets with fur or feathers out of your home.    If you can t keep the pet outdoors, then keep the pet out of your bedroom.  Keep the bedroom door closed.  Keep pets off cloth furniture and away from stuffed toys.     Mice, Rats, and Cockroaches  Some people are allergic to the waste from these pests.   Cover food and garbage.  Clean up spills and food crumbs.  Store grease in the refrigerator.   Keep food out of the bedroom.   Indoor Mold  This can be a trigger if your home has high moisture. Fix leaking faucets, pipes, or other sources of water.   Clean moldy surfaces.  Dehumidify basement if it is damp and smelly.   Smoke, Strong Odors, and Sprays  These can reduce air quality. Stay away from strong odors and sprays, such as perfume, powder, hair spray, paints, smoke incense, paint, cleaning products, candles and new carpet.   Exercise or Sports  Some people with asthma have this trigger. Be active!  Ask your doctor about taking medicine before sports or exercise to prevent symptoms.    Warm up for 5-10 minutes before and after sports or exercise.     Other Triggers of Asthma  Cold air:  Cover your nose and mouth with a scarf.  Sometimes laughing or crying can be a trigger.  Some medicines and food can trigger asthma.
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
## 2023-04-25 LAB — HCV AB SERPL QL IA: NONREACTIVE

## 2023-04-25 NOTE — TELEPHONE ENCOUNTER
Prior Authorization Retail Medication Request    Medication/Dose: adapalene  ICD code (if different than what is on RX):    Previously Tried and Failed:  Clindamycin; benzaclin; nixoral; bactroban; benzoyl peroxide foam  Rationale:  Patient has used since 6/2021    Insurance Name:  [phone]  Insurance ID:  [id number]   Sure Script: [id number]      Pharmacy Information (if different than what is on RX)  Name:    Phone:

## 2023-04-28 NOTE — TELEPHONE ENCOUNTER
Prior Authorization Approval    Authorization Effective Date: 4/28/2023  Authorization Expiration Date: 4/27/2024  Medication: Adapalene 0.1% cream    Approved Dose/Quantity:   Reference #:     Insurance Company: [facility] [employer] - Phone [phone] Fax [phone]  Expected CoPay:       CoPay Card Available:      Foundation Assistance Needed:    Which Pharmacy is filling the prescription (Not needed for infusion/clinic administered): [id number] [address]  Pharmacy Notified: Yes  Patient Notified: Yes

## 2023-04-28 NOTE — TELEPHONE ENCOUNTER
Central Prior Authorization Team   Phone: [phone]    PA Initiation    Medication: aAdapalene 0.1% cream    Insurance Company: [employer] - Phone [phone] Fax [phone]  Pharm[facility]illing [id number] [place] [facility] [place], [place] - [address] [place] [address]  Filling Pharmacy Phone: [phone]  Filling Pharmacy Fax:    Start Date: 4/28/2023

## 2023-06-01 ENCOUNTER — MYC MEDICAL ADVICE (OUTPATIENT)
Dept: FAMILY MEDICINE | Facility: CLINIC | Age: 43
End: 2023-06-01
Payer: COMMERCIAL
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
## 2024-01-25 ENCOUNTER — TRANSFERRED RECORDS (OUTPATIENT)
Dept: HEALTH INFORMATION MANAGEMENT | Facility: CLINIC | Age: 44
End: 2024-01-25
Payer: COMMERCIAL

## 2024-03-01 ENCOUNTER — MYC MEDICAL ADVICE (OUTPATIENT)
Dept: FAMILY MEDICINE | Facility: CLINIC | Age: 44
End: 2024-03-01
Payer: COMMERCIAL

## 2024-03-01 NOTE — TELEPHONE ENCOUNTER
Patient Quality Outreach    Patient is due for the following:   Depression  -  PHQ-9 needed/GAD7 needed    Next Steps:   Patient was assigned appropriate questionnaire to complete    Type of outreach:    Sent Docin message.    Questions for provider review:    None           Nyasia Davila NP

## 2024-03-29 ENCOUNTER — MYC MEDICAL ADVICE (OUTPATIENT)
Dept: FAMILY MEDICINE | Facility: CLINIC | Age: 44
End: 2024-03-29
Payer: COMMERCIAL

## 2024-03-29 DIAGNOSIS — Z12.31 ENCOUNTER FOR SCREENING MAMMOGRAM FOR BREAST CANCER: Primary | ICD-10-CM

## 2024-04-17 ENCOUNTER — HOSPITAL ENCOUNTER (OUTPATIENT)
Dept: MAMMOGRAPHY | Facility: CLINIC | Age: 44
Discharge: HOME | End: 2024-04-17
Attending: NURSE PRACTITIONER
Payer: COMMERCIAL

## 2024-04-17 ENCOUNTER — HOSPITAL ENCOUNTER (OUTPATIENT)
Dept: ULTRASOUND IMAGING | Facility: CLINIC | Age: 44
Discharge: HOME | End: 2024-04-17
Attending: NURSE PRACTITIONER
Payer: COMMERCIAL

## 2024-04-17 DIAGNOSIS — N64.89 BREAST ASYMMETRY: ICD-10-CM

## 2024-04-17 PROCEDURE — 76642 ULTRASOUND BREAST LIMITED: CPT | Mod: LT

## 2024-04-17 PROCEDURE — 77062 BREAST TOMOSYNTHESIS BI: CPT

## 2024-05-23 ENCOUNTER — OFFICE VISIT (OUTPATIENT)
Dept: FAMILY MEDICINE | Facility: CLINIC | Age: 44
End: 2024-05-23
Payer: COMMERCIAL

## 2024-05-23 VITALS
BODY MASS INDEX: 20.13 KG/M2 | WEIGHT: 140.6 LBS | DIASTOLIC BLOOD PRESSURE: 68 MMHG | HEART RATE: 101 BPM | TEMPERATURE: 97 F | RESPIRATION RATE: 14 BRPM | SYSTOLIC BLOOD PRESSURE: 104 MMHG | HEIGHT: 70 IN | OXYGEN SATURATION: 98 %

## 2024-05-23 DIAGNOSIS — G43.101 MIGRAINE WITH AURA AND WITH STATUS MIGRAINOSUS, NOT INTRACTABLE: ICD-10-CM

## 2024-05-23 DIAGNOSIS — L98.9 SKIN LESION: ICD-10-CM

## 2024-05-23 DIAGNOSIS — Z00.00 ROUTINE GENERAL MEDICAL EXAMINATION AT A HEALTH CARE FACILITY: Primary | ICD-10-CM

## 2024-05-23 DIAGNOSIS — L70.0 ACNE VULGARIS: ICD-10-CM

## 2024-05-23 DIAGNOSIS — Z13.220 SCREENING FOR HYPERLIPIDEMIA: ICD-10-CM

## 2024-05-23 DIAGNOSIS — J45.20 MILD INTERMITTENT ASTHMA WITHOUT COMPLICATION: ICD-10-CM

## 2024-05-23 LAB
ANION GAP SERPL CALCULATED.3IONS-SCNC: 11 MMOL/L (ref 7–15)
BUN SERPL-MCNC: 11.9 MG/DL (ref 6–20)
CALCIUM SERPL-MCNC: 10.2 MG/DL (ref 8.6–10)
CHLORIDE SERPL-SCNC: 101 MMOL/L (ref 98–107)
CHOLEST SERPL-MCNC: 280 MG/DL
CREAT SERPL-MCNC: 0.89 MG/DL (ref 0.51–0.95)
DEPRECATED HCO3 PLAS-SCNC: 29 MMOL/L (ref 22–29)
EGFRCR SERPLBLD CKD-EPI 2021: 82 ML/MIN/1.73M2
FASTING STATUS PATIENT QL REPORTED: YES
FASTING STATUS PATIENT QL REPORTED: YES
GLUCOSE SERPL-MCNC: 96 MG/DL (ref 70–99)
HDLC SERPL-MCNC: 84 MG/DL
LDLC SERPL CALC-MCNC: 184 MG/DL
NONHDLC SERPL-MCNC: 196 MG/DL
POTASSIUM SERPL-SCNC: 4.1 MMOL/L (ref 3.4–5.3)
SODIUM SERPL-SCNC: 141 MMOL/L (ref 135–145)
TRIGL SERPL-MCNC: 61 MG/DL

## 2024-05-23 PROCEDURE — 80061 LIPID PANEL: CPT | Performed by: NURSE PRACTITIONER

## 2024-05-23 PROCEDURE — 99396 PREV VISIT EST AGE 40-64: CPT | Performed by: NURSE PRACTITIONER

## 2024-05-23 PROCEDURE — 80048 BASIC METABOLIC PNL TOTAL CA: CPT | Performed by: NURSE PRACTITIONER

## 2024-05-23 PROCEDURE — 36415 COLL VENOUS BLD VENIPUNCTURE: CPT | Performed by: NURSE PRACTITIONER

## 2024-05-23 PROCEDURE — 99214 OFFICE O/P EST MOD 30 MIN: CPT | Mod: 25 | Performed by: NURSE PRACTITIONER

## 2024-05-23 RX ORDER — ALBUTEROL SULFATE 90 UG/1
2 AEROSOL, METERED RESPIRATORY (INHALATION) EVERY 6 HOURS PRN
Qty: 18 G | Refills: 3 | Status: SHIPPED | OUTPATIENT
Start: 2024-05-23

## 2024-05-23 RX ORDER — DULOXETINE 40 MG/1
CAPSULE, DELAYED RELEASE ORAL
COMMUNITY

## 2024-05-23 RX ORDER — ADAPALENE 0.1 G/100G
CREAM TOPICAL AT BEDTIME
Qty: 45 G | Refills: 11 | Status: SHIPPED | OUTPATIENT
Start: 2024-05-23

## 2024-05-23 RX ORDER — CETIRIZINE HYDROCHLORIDE 10 MG/1
1 TABLET ORAL
COMMUNITY
Start: 2023-07-08 | End: 2024-05-23

## 2024-05-23 RX ORDER — CLINDAMYCIN HCL 300 MG
300 CAPSULE ORAL 3 TIMES DAILY
COMMUNITY
Start: 2024-02-20 | End: 2024-05-23

## 2024-05-23 RX ORDER — CLINDAMYCIN HCL 150 MG
CAPSULE ORAL
COMMUNITY
Start: 2023-11-14 | End: 2024-05-23

## 2024-05-23 RX ORDER — DULOXETIN HYDROCHLORIDE 30 MG/1
30 CAPSULE, DELAYED RELEASE ORAL 2 TIMES DAILY
COMMUNITY
End: 2024-05-23

## 2024-05-23 RX ORDER — ZOLMITRIPTAN 5 MG/1
5 TABLET, FILM COATED ORAL
Qty: 6 TABLET | Refills: 5 | Status: SHIPPED | OUTPATIENT
Start: 2024-05-23

## 2024-05-23 RX ORDER — SPIRONOLACTONE 100 MG/1
100 TABLET, FILM COATED ORAL 2 TIMES DAILY
Qty: 180 TABLET | Refills: 3 | Status: SHIPPED | OUTPATIENT
Start: 2024-05-23

## 2024-05-23 SDOH — HEALTH STABILITY: PHYSICAL HEALTH: ON AVERAGE, HOW MANY MINUTES DO YOU ENGAGE IN EXERCISE AT THIS LEVEL?: 30 MIN

## 2024-05-23 SDOH — HEALTH STABILITY: PHYSICAL HEALTH: ON AVERAGE, HOW MANY DAYS PER WEEK DO YOU ENGAGE IN MODERATE TO STRENUOUS EXERCISE (LIKE A BRISK WALK)?: 2 DAYS

## 2024-05-23 ASSESSMENT — ASTHMA QUESTIONNAIRES
ACT_TOTALSCORE: 24
QUESTION_2 LAST FOUR WEEKS HOW OFTEN HAVE YOU HAD SHORTNESS OF BREATH: NOT AT ALL
QUESTION_4 LAST FOUR WEEKS HOW OFTEN HAVE YOU USED YOUR RESCUE INHALER OR NEBULIZER MEDICATION (SUCH AS ALBUTEROL): NOT AT ALL
QUESTION_5 LAST FOUR WEEKS HOW WOULD YOU RATE YOUR ASTHMA CONTROL: WELL CONTROLLED
QUESTION_1 LAST FOUR WEEKS HOW MUCH OF THE TIME DID YOUR ASTHMA KEEP YOU FROM GETTING AS MUCH DONE AT WORK, SCHOOL OR AT HOME: NONE OF THE TIME
QUESTION_3 LAST FOUR WEEKS HOW OFTEN DID YOUR ASTHMA SYMPTOMS (WHEEZING, COUGHING, SHORTNESS OF BREATH, CHEST TIGHTNESS OR PAIN) WAKE YOU UP AT NIGHT OR EARLIER THAN USUAL IN THE MORNING: NOT AT ALL
ACT_TOTALSCORE: 24

## 2024-05-23 ASSESSMENT — PATIENT HEALTH QUESTIONNAIRE - PHQ9
10. IF YOU CHECKED OFF ANY PROBLEMS, HOW DIFFICULT HAVE THESE PROBLEMS MADE IT FOR YOU TO DO YOUR WORK, TAKE CARE OF THINGS AT HOME, OR GET ALONG WITH OTHER PEOPLE: SOMEWHAT DIFFICULT
SUM OF ALL RESPONSES TO PHQ QUESTIONS 1-9: 10
SUM OF ALL RESPONSES TO PHQ QUESTIONS 1-9: 10

## 2024-05-23 ASSESSMENT — PAIN SCALES - GENERAL: PAINLEVEL: NO PAIN (0)

## 2024-05-23 ASSESSMENT — SOCIAL DETERMINANTS OF HEALTH (SDOH): HOW OFTEN DO YOU GET TOGETHER WITH FRIENDS OR RELATIVES?: ONCE A WEEK

## 2024-05-23 NOTE — PATIENT INSTRUCTIONS
"Try pepcid 10-20 mg twice daily to reduce acid in the stomach.    Preventive Care Advice   This is general advice we often give to help people stay healthy. Your care team may have specific advice just for you. Please talk to your care team about your own preventive care needs.  Lifestyle  Exercise at least 150 minutes each week (30 minutes a day, 5 days a week).  Do muscle strengthening activities 2 days a week. These help control your weight and prevent disease.  No smoking.  Wear sunscreen to prevent skin cancer.  Have your home tested for radon every 2 to 5 years. Radon is a colorless, odorless gas that can harm your lungs. To learn more, go to www.health.[facility].mn.us and search for \"Radon in Homes.\"  Keep guns unloaded and locked up in a safe place like a safe or gun vault, or, use a gun lock and hide the keys. Always lock away bullets separately. To learn more, visit [place] and search for \"safe gun storage.\"  Nutrition  Eat 5 or more servings of fruits and vegetables each day.  Try wheat bread, brown rice and whole grain pasta (instead of white bread, rice, and pasta).  Get enough calcium and vitamin D. Check the label on foods and aim for 100% of the RDA (recommended daily allowance).  Regular exams  Have a dental exam and cleaning every 6 months.  See your health care team every year to talk about:  Any changes in your health.  Any medicines your care team has prescribed.  Preventive care, family planning, and ways to prevent chronic diseases.  Shots (vaccines)   HPV shots (up to age 26), if you've never had them before.  Hepatitis B shots (up to age 59), if you've never had them before.  COVID-19 shot: Get this shot when it's due.  Flu shot: Get a flu shot every year.  Tetanus shot: Get a tetanus shot every 10 years.  Pneumococcal, hepatitis A, and RSV shots: Ask your care team if you need these based on your risk.  Shingles shot (for age 50 and up).  General health tests  Diabetes screening:  Starting " at age 35, Get screened for diabetes at least every 3 years.  If you are younger than age 35, ask your care team if you should be screened for diabetes.  Cholesterol test: At age 39, start having a cholesterol test every 5 years, or more often if advised.  Bone density scan (DEXA): At age 50, ask your care team if you should have this scan for osteoporosis (brittle bones).  Hepatitis C: Get tested at least once in your life.  Abdominal aortic aneurysm screening: Talk to your doctor about having this screening if you:  Have ever smoked; and  Are biologically male; and  Are between the ages of 65 and 75.  STIs (sexually transmitted infections)  Before age 24: Ask your care team if you should be screened for STIs.  After age 24: Get screened for STIs if you're at risk. You are at risk for STIs (including HIV) if:  You are sexually active with more than one person.  You don't use condoms every time.  You or a partner was diagnosed with a sexually transmitted infection.  If you are at risk for HIV, ask about PrEP medicine to prevent HIV.  Get tested for HIV at least once in your life, whether you are at risk for HIV or not.  Cancer screening tests  Cervical cancer screening: If you have a cervix, begin getting regular cervical cancer screening tests at age 21. Most people who have regular screenings with normal results can stop after age 65. Talk about this with your provider.  Breast cancer scan (mammogram): If you've ever had breasts, begin having regular mammograms starting at age 40. This is a scan to check for breast cancer.  Colon cancer screening: It is important to start screening for colon cancer at age 45.  Have a colonoscopy test every 10 years (or more often if you're at risk) Or, ask your provider about stool tests like a FIT test every year or Cologuard test every 3 years.  To learn more about your testing options, visit: www.[employer]/469932.pdf.  For help making a decision, visit:  farzaneh/ey94110.  Prostate cancer screening test: If you have a prostate and are age 55 to 69, ask your provider if you would benefit from a yearly prostate cancer screening test.  Lung cancer screening: If you are a current or former smoker age 50 to 80, ask your care team if ongoing lung cancer screenings are right for you.  For informational purposes only. Not to replace the advice of your health care provider. Copyright   2023 [facility]. All rights reserved. Clinically reviewed by the [facility] Transitions Program. [employer] 169015 - REV 04/24.    Learning About Stress  What is stress?     Stress is your body's response to a hard situation. Your body can have a physical, emotional, or mental response. Stress is a fact of life for most people, and it affects everyone differently. What causes stress for you may not be stressful for someone else.  A lot of things can cause stress. You may feel stress when you go on a job interview, take a test, or run a race. This kind of short-term stress is normal and even useful. It can help you if you need to work hard or react quickly. For example, stress can help you finish an important job on time.  Long-term stress is caused by ongoing stressful situations or events. Examples of long-term stress include long-term health problems, ongoing problems at work, or conflicts in your family. Long-term stress can harm your health.  How does stress affect your health?  When you are stressed, your body responds as though you are in danger. It makes hormones that speed up your heart, make you breathe faster, and give you a burst of energy. This is called the fight-or-flight stress response. If the stress is over quickly, your body goes back to normal and no harm is done.  But if stress happens too often or lasts too long, it can have bad effects. Long-term stress can make you more likely to get sick, and it can make symptoms of some diseases worse. If you tense  up when you are stressed, you may develop neck, shoulder, or low back pain. Stress is linked to high blood pressure and heart disease.  Stress also harms your emotional health. It can make you ortiz, tense, or depressed. Your relationships may suffer, and you may not do well at work or school.  What can you do to manage stress?  You can try these things to help manage stress:   Do something active. Exercise or activity can help reduce stress. Walking is a great way to get started. Even everyday activities such as housecleaning or yard work can help.  Try yoga or lo chi. These techniques combine exercise and meditation. You may need some training at first to learn them.  Do something you enjoy. For example, listen to music or go to a movie. Practice your hobby or do volunteer work.  Meditate. This can help you relax, because you are not worrying about what happened before or what may happen in the future.  Do guided imagery. Imagine yourself in any setting that helps you feel calm. You can use online videos, books, or a teacher to guide you.  Do breathing exercises. For example:  From a standing position, bend forward from the waist with your knees slightly bent. Let your arms dangle close to the floor.  Breathe in slowly and deeply as you return to a standing position. Roll up slowly and lift your head last.  Hold your breath for just a few seconds in the standing position.  Breathe out slowly and bend forward from the waist.  Let your feelings out. Talk, laugh, cry, and express anger when you need to. Talking with supportive friends or family, a counselor, or a michelle leader about your feelings is a healthy way to relieve stress. Avoid discussing your feelings with people who make you feel worse.  Write. It may help to write about things that are bothering you. This helps you find out how much stress you feel and what is causing it. When you know this, you can find better ways to cope.  What can you do to prevent  "stress?  You might try some of these things to help prevent stress:  Manage your time. This helps you find time to do the things you want and need to do.  Get enough sleep. Your body recovers from the stresses of the day while you are sleeping.  Get support. Your family, friends, and community can make a difference in how you experience stress.  Limit your news feed. Avoid or limit time on social media or news that may make you feel stressed.  Do something active. Exercise or activity can help reduce stress. Walking is a great way to get started.  Where can you learn more?  Go to https://www.[employer]Cr[employer].net/patiented  Enter N032 in the search box to learn more about \"Learning About Stress.\"  Current as of: October 24, 2023               Content Version: 14.0    0860-6029 [employer].   Care instructions adapted under license by your healthcare professional. If you have questions about a medical condition or this instruction, always ask your healthcare professional. [employer] disclaims any warranty or liability for your use of this information.      Learning About Depression Screening  What is depression screening?  Depression screening is a way to see if you have depression symptoms. It may be done by a doctor or counselor. It's often part of a routine checkup. That's because your mental health is just as important as your physical health.  Depression is a mental health condition that affects how you feel, think, and act. You may:  Have less energy.  Lose interest in your daily activities.  Feel sad and grouchy for a long time.  Depression is very common. It affects people of all ages.  Many things can lead to depression. Some people become depressed after they have a stroke or find out they have a major illness like cancer or heart disease. The death of a loved one or a breakup may lead to depression. It can run in families. Most experts believe that a combination of inherited genes and " "stressful life events can cause it.  What happens during screening?  You may be asked to fill out a form about your depression symptoms. You and the doctor will discuss your answers. The doctor may ask you more questions to learn more about how you think, act, and feel.  What happens after screening?  If you have symptoms of depression, your doctor will talk to you about your options.  Doctors usually treat depression with medicines or counseling. Often, combining the two works best. Many people don't get help because they think that they'll get over the depression on their own. But people with depression may not get better unless they get treatment.  The cause of depression is not well understood. There may be many factors involved. But if you have depression, it's not your fault.  A serious symptom of depression is thinking about death or suicide. If you or someone you care about talks about this or about feeling hopeless, get help right away.  It's important to know that depression can be treated. Medicine, counseling, and self-care may help.  Where can you learn more?  Go to https://www.[employer].net/patiented  Enter T185 in the search box to learn more about \"Learning About Depression Screening.\"  Current as of: June 24, 2023               Content Version: 14.0    2801-2104 [employer].   Care instructions adapted under license by your healthcare professional. If you have questions about a medical condition or this instruction, always ask your healthcare professional. [employer] disclaims any warranty or liability for your use of this information.      "

## 2024-05-23 NOTE — PROGRESS NOTES
Preventive Care Visit  [facility]  Nyasia Davila NP, Family Medicine  May 23, 2024    Assessment & Plan     Routine general medical examination at a health care facility  Patient is up-to-date on all her screenings.  She is eligible for pneumonia vaccination due to her intermittent asthma but is not bothering her often so we will hold off on this vaccination for now.  Reviewed preventative health recommendations and advised follow-up in 1 year.  - REVIEW OF HEALTH MAINTENANCE PROTOCOL ORDERS  - PRIMARY CARE FOLLOW-UP SCHEDULING; Future    Acne vulgaris  Stable.  Refilled medications for acne.  Ordered BMP for evaluation of kidney function and electrolytes since she is on spironolactone.  - adapalene (DIFFERIN) 0.1 % external cream; Apply topically at bedtime  - spironolactone (ALDACTONE) 100 MG tablet; Take 1 tablet (100 mg) by mouth 2 times daily  - Basic metabolic panel  (Ca, Cl, CO2, Creat, Gluc, K, Na, BUN); Future    Mild intermittent asthma without complication  Stable.  Refilled albuterol for as needed use.  - albuterol (PROAIR HFA/PROVENTIL HFA/VENTOLIN HFA) 108 (90 Base) MCG/ACT inhaler; Inhale 2 puffs into the lungs every 6 hours as needed for shortness of breath or wheezing    Migraine with aura and with status migrainosus, not intractable  Stable with minimal use of abortive medication.  Refilled the Zomig for as needed use.  - ZOLMitriptan (ZOMIG) 5 MG tablet; Take 1 tablet (5 mg) by mouth at onset of headache for migraine May repeat in 2 hours. Max 2 tablets/24 hours.    Skin lesion  Patient has a lesion on her tip of her nose that she is concerned about that is flesh-colored but scaly.  Referral to dermatology for skin check.  - Adult Dermatology  Referral; Future    Screening for hyperlipidemia  Lipid ordered for reevaluation due to elevated LDL at 183 last year.  Patient is fasting today so we should get a better idea of this number.  - Lipid panel reflex  to direct LDL Fasting; Future    Patient has been advised of split billing requirements and indicates understanding: Yes    Counseling  Appropriate preventive services were discussed with this patient, including applicable screening as appropriate for fall prevention, nutrition, physical activity, Tobacco-use cessation, weight loss and cognition.  Checklist reviewing preventive services available has been given to the patient.  Reviewed patient's diet, addressing concerns and/or questions.   She is at risk for lack of exercise and has been provided with information to increase physical activity for the benefit of her well-being.   The patient's PHQ-9 score is consistent with moderate depression. She was provided with information regarding depression.       See Patient Instructions    Ashley Girard is a 44 year old, presenting for the following:  Physical        5/23/2024     8:56 AM   Additional Questions   Roomed by rmb   Accompanied by self         5/23/2024     8:56 AM   Patient Reported Additional Medications   Patient reports taking the following new medications none        Health Care Directive  Patient does not have a Health Care Directive or Living Will: Discussed advance care planning with patient; information given to patient to review.    HPI        5/23/2024   General Health   How would you rate your overall physical health? Good   Feel stress (tense, anxious, or unable to sleep) To some extent   (!) STRESS CONCERN      5/23/2024   Nutrition   Three or more servings of calcium each day? (!) NO   Diet: Regular (no restrictions)   How many servings of fruit and vegetables per day? (!) 2-3   How many sweetened beverages each day? 0-1         5/23/2024   Exercise   Days per week of moderate/strenous exercise 2 days   Average minutes spent exercising at this level 30 min   (!) EXERCISE CONCERN      5/23/2024   Social Factors   Frequency of gathering with friends or relatives Once a week   Worry food  won't last until get money to buy more No   Food not last or not have enough money for food? No   Do you have housing?  Yes   Are you worried about losing your housing? No   Lack of transportation? No   Unable to get utilities (heat,electricity)? No         5/23/2024   Dental   Dentist two times every year? Yes         5/23/2024   TB Screening   Were you born outside of the [place]? No     Today's PHQ-9 Score:       5/23/2024     8:51 AM   PHQ-9 SCORE   PHQ-9 Total Score MyChart 10 (Moderate depression)   PHQ-9 Total Score 10   Follows with psychiatry        5/23/2024   Substance Use   Alcohol more than 3/day or more than 7/wk No   Do you use any other substances recreationally? No     Social History     Tobacco Use    Smoking status: Never    Smokeless tobacco: Never   Vaping Use    Vaping status: Never Used   Substance Use Topics    Alcohol use: Yes     Comment: casual    Drug use: No         11/22/2022   LAST FHS-7 RESULTS   1st degree relative breast or ovarian cancer No    No   Any relative bilateral breast cancer No    No   Any male have breast cancer No    No   Any ONE woman have BOTH breast AND ovarian cancer No    No   Any woman with breast cancer before 50yrs No    No   2 or more relatives with breast AND/OR ovarian cancer No    No   2 or more relatives with breast AND/OR bowel cancer No     Mammogram Screening - Mammogram every 1-2 years updated in Health Maintenance based on mutual decision making        5/23/2024   STI Screening   New sexual partner(s) since last STI/HIV test? No     History of abnormal Pap smear: No - age 30- 65 PAP with HPV every 5 years recommended        Latest Ref Rng & Units 2/16/2022     5:07 PM 9/11/2020    11:28 AM 9/11/2020    11:10 AM   PAP / HPV   PAP  Negative for Intraepithelial Lesion or Malignancy (NILM)      PAP (Historical)   NIL     HPV 16 DNA Negative Negative   Negative    HPV 18 DNA Negative Negative   Negative    Other HR HPV Negative Negative   Negative      ASCVD  Risk   The 10-year ASCVD risk score (Madisyn VINCENT, et al., 2019) is: 0.5%    Values used to calculate the score:      Age: 44 years      Sex: Female      Is Non- : No      Diabetic: No      Tobacco smoker: No      Systolic Blood Pressure: 104 mmHg      Is BP treated: No      HDL Cholesterol: 77 mg/dL      Total Cholesterol: 275 mg/dL        5/23/2024   Contraception/Family Planning   Questions about contraception or family planning No     Reviewed and updated as needed this visit by Provider   Tobacco  Allergies    Med Hx  Surg Hx  Fam Hx  Soc Hx Sexual   Activity          Past Medical History:   Diagnosis Date    Anxiety     Chickenpox     Depression     GERD (gastroesophageal reflux disease)     HPV (human papilloma virus) infection     IBS (irritable bowel syndrome)     Intermittent asthma     Papanicolaou smear of cervix with low grade squamous intraepithelial lesion (LGSIL) 02/27/2018    S/P LEEP of cervix age 24    path results?    Urinary tract infections      Past Surgical History:   Procedure Laterality Date    LAPAROSCOPIC APPENDECTOMY  6/20/2012    Procedure: LAPAROSCOPIC APPENDECTOMY;  Laparoscopic Appendectomy;  Surgeon: Jorge Luis Tan MD;  Location: [facility] OR    LEEP TX, CERVICAL  age 24    MOUTH SURGERY      wisdom teeth     Lab work is in process  Labs reviewed in EPIC  BP Readings from Last 3 Encounters:   05/23/24 104/68   04/24/23 98/60   03/22/23 100/61    Wt Readings from Last 3 Encounters:   05/23/24 63.8 kg (140 lb 9.6 oz)   04/24/23 67.6 kg (149 lb 2 oz)   03/22/23 65.8 kg (145 lb)                  Patient Active Problem List   Diagnosis    Intermittent asthma    Lactose intolerance    Moderate major depression (H)    IUD (intrauterine device) in place    Moderate dysplasia of cervix (RAYMON II)    Attention deficit hyperactivity disorder (ADHD), combined type    Major depressive disorder, recurrent episode, moderate (H)    Headache    Lump or mass in  breast    Seasonal affective disorder (H24)     Past Surgical History:   Procedure Laterality Date    LAPAROSCOPIC APPENDECTOMY  6/20/2012    Procedure: LAPAROSCOPIC APPENDECTOMY;  Laparoscopic Appendectomy;  Surgeon: Jorge Luis Tan MD;  Location: [facility] [place], CERVICAL  age 24    MOUTH SURGERY      wisdom teeth       Social History     Tobacco Use    Smoking status: Never    Smokeless tobacco: Never   Substance Use Topics    Alcohol use: Yes     Comment: casual     Family History   Problem Relation Age of Onset    Depression Mother     Gastrointestinal Disease Mother     Migraines Mother     Hyperlipidemia Mother     Allergies Father     Hypertension Father     Parkinsonism Father     Asthma Brother     Depression Brother     Asthma Brother     Alcohol/Drug Maternal Grandmother     Cardiovascular Maternal Grandfather         MI    Eye Disorder Paternal Grandmother     Osteoporosis Paternal Grandmother     Hypertension Paternal Grandmother     Alzheimer Disease Paternal Grandfather     Attention Deficit Disorder Daughter     Attention Deficit Disorder Son          Current Outpatient Medications   Medication Sig Dispense Refill    adapalene (DIFFERIN) 0.1 % external cream Apply topically at bedtime 45 g 11    albuterol (PROAIR HFA/PROVENTIL HFA/VENTOLIN HFA) 108 (90 Base) MCG/ACT inhaler Inhale 2 puffs into the lungs every 6 hours as needed for shortness of breath or wheezing 18 g 3    ALPRAZolam (XANAX) 0.5 MG tablet Take 1 tablet (0.5 mg) by mouth nightly as needed for anxiety or sleep 30 tablet 0    amphetamine-dextroamphetamine (ADDERALL XR) 20 MG 24 hr capsule 40 mg      amphetamine-dextroamphetamine (ADDERALL) 5 MG tablet TAKE 1 TABLET DAILY, NO LATER THAN 2PM.      Ascorbic Acid (VITAMIN C PO) Take 1,000 mg by mouth daily.      calcium carbonate-vitamin D 600-400 MG-UNIT CHEW Take 1 chew tab by mouth daily      Cholecalciferol (VITAMIN D3 PO) Take 2,000 Units by mouth daily.      DULoxetine HCl 40  "MG CPEP 40 mg in the morning and 30 mg in the evening.      guanFACINE (INTUNIV) 1 MG TB24 24 hr tablet TAKE 1 TABLET BY MOUTH AT BEDTIME 90 tablet 1    ibuprofen (ADVIL,MOTRIN) 400 MG tablet Take 1-2 tablets by mouth every 6 hours as needed (cramping). 120 tablet 0    Lactobacillus (ACIDOPHILUS PO) Take 1 tablet by mouth daily.      levonorgestrel (MIRENA) 20 MCG/24HR IUD 1 each (20 mcg) by Intrauterine route once      Pseudoephedrine HCl (SUDAFED 12 HOUR PO) Take 1 tablet by mouth once.      spironolactone (ALDACTONE) 100 MG tablet Take 1 tablet (100 mg) by mouth 2 times daily 180 tablet 3    ZOLMitriptan (ZOMIG) 5 MG tablet Take 1 tablet (5 mg) by mouth at onset of headache for migraine May repeat in 2 hours. Max 2 tablets/24 hours. 6 tablet 5     Allergies   Allergen Reactions    Amoxicillin-Pot Clavulanate Diarrhea    Diflucan Blisters and Rash     Recent Labs   Lab Test 04/24/23  1407 02/16/22  1733 11/17/17  1537 08/27/17  1742   * 150*  --   --    HDL 77 71  --   --    TRIG 77 80  --   --    ALT  --   --  28  --    CR 0.83 0.78  --  0.78   GFRESTIMATED 90 >90  --  83   GFRESTBLACK  --   --   --  >90   POTASSIUM 4.6 4.0  --  4.3   TSH  --  1.28  --   --           Review of Systems  CONSTITUTIONAL: NEGATIVE for fever, chills, change in weight  INTEGUMENTARY/SKIN: POSITIVE for flesh colored lesion on nose wants skin check  RESP: NEGATIVE for significant cough or SOB  CV: NEGATIVE for chest pain, palpitations or peripheral edema  GI: NEGATIVE for nausea, abdominal pain, heartburn, or change in bowel habits and POSITIVE for heartburn or reflux  PSYCHIATRIC: POSITIVE foranxiety, depressed mood, Hx anxiety, Hx depression, and hx of ADHD  ROS otherwise negative     Objective    Exam  /68   Pulse 101   Temp 97  F (36.1  C) (Tympanic)   Resp 14   Ht 1.785 m (5' 10.28\")   Wt 63.8 kg (140 lb 9.6 oz)   LMP  (LMP Unknown)   SpO2 98%   BMI 20.02 kg/m     Estimated body mass index is 20.02 kg/m  as " "calculated from the following:    Height as of this encounter: 1.785 m (5' 10.28\").    Weight as of this encounter: 63.8 kg (140 lb 9.6 oz).    Physical Exam  GENERAL: alert and no distress  EYES: Eyes grossly normal to inspection, PERRL and conjunctivae and sclerae normal  HENT: ear canals and TM's normal, nose and mouth without ulcers or lesions  NECK: no adenopathy, no asymmetry, masses, or scars  RESP: lungs clear to auscultation - no rales, rhonchi or wheezes  CV: regular rate and rhythm, normal S1 S2, no S3 or S4, no murmur, click or rub, no peripheral edema  ABDOMEN: soft, nontender, no hepatosplenomegaly, no masses and bowel sounds normal  MS: no gross musculoskeletal defects noted, no edema  SKIN: no suspicious lesions or rashes  NEURO: Normal strength and tone, mentation intact and speech normal  PSYCH: mentation appears normal, affect normal/bright  LYMPH: normal ant/post cervical, supraclavicular nodes      Signed Electronically by: Nyasia Davila NP    Answers submitted by the patient for this visit:  Patient Health Questionnaire (Submitted on 5/23/2024)  If you checked off any problems, how difficult have these problems made it for you to do your work, take care of things at home, or get along with other people?: Somewhat difficult  PHQ9 TOTAL SCORE: 10    "

## 2024-05-24 ENCOUNTER — TELEPHONE (OUTPATIENT)
Dept: FAMILY MEDICINE | Facility: CLINIC | Age: 44
End: 2024-05-24
Payer: COMMERCIAL

## 2024-05-24 NOTE — TELEPHONE ENCOUNTER
Please advise patient that her insurance does not cover the differin.  This can be purchased over the counter for $15 at [employer] or [employer].  Nyasia Davila NP on 5/24/2024 at 2:58 PM

## 2024-11-30 ASSESSMENT — PATIENT HEALTH QUESTIONNAIRE - PHQ9
10. IF YOU CHECKED OFF ANY PROBLEMS, HOW DIFFICULT HAVE THESE PROBLEMS MADE IT FOR YOU TO DO YOUR WORK, TAKE CARE OF THINGS AT HOME, OR GET ALONG WITH OTHER PEOPLE: SOMEWHAT DIFFICULT
SUM OF ALL RESPONSES TO PHQ QUESTIONS 1-9: 13
SUM OF ALL RESPONSES TO PHQ QUESTIONS 1-9: 13

## 2024-12-04 ENCOUNTER — OFFICE VISIT (OUTPATIENT)
Dept: OBGYN | Facility: CLINIC | Age: 44
End: 2024-12-04
Payer: COMMERCIAL

## 2024-12-04 VITALS
RESPIRATION RATE: 18 BRPM | DIASTOLIC BLOOD PRESSURE: 71 MMHG | HEART RATE: 106 BPM | TEMPERATURE: 98.7 F | BODY MASS INDEX: 20.04 KG/M2 | HEIGHT: 70 IN | SYSTOLIC BLOOD PRESSURE: 112 MMHG | WEIGHT: 140 LBS

## 2024-12-04 DIAGNOSIS — E83.52 SERUM CALCIUM ELEVATED: ICD-10-CM

## 2024-12-04 DIAGNOSIS — Z12.4 CERVICAL CANCER SCREENING: ICD-10-CM

## 2024-12-04 DIAGNOSIS — Z01.419 ENCOUNTER FOR GYNECOLOGICAL EXAMINATION WITHOUT ABNORMAL FINDING: Primary | ICD-10-CM

## 2024-12-04 LAB
ANION GAP SERPL CALCULATED.3IONS-SCNC: 10 MMOL/L (ref 7–15)
BUN SERPL-MCNC: 11.7 MG/DL (ref 6–20)
CALCIUM SERPL-MCNC: 9.7 MG/DL (ref 8.8–10.4)
CHLORIDE SERPL-SCNC: 102 MMOL/L (ref 98–107)
CREAT SERPL-MCNC: 0.78 MG/DL (ref 0.51–0.95)
EGFRCR SERPLBLD CKD-EPI 2021: >90 ML/MIN/1.73M2
GLUCOSE SERPL-MCNC: 115 MG/DL (ref 70–99)
HCO3 SERPL-SCNC: 29 MMOL/L (ref 22–29)
POTASSIUM SERPL-SCNC: 4.2 MMOL/L (ref 3.4–5.3)
SODIUM SERPL-SCNC: 141 MMOL/L (ref 135–145)

## 2024-12-04 PROCEDURE — 87624 HPV HI-RISK TYP POOLED RSLT: CPT | Performed by: PHYSICIAN ASSISTANT

## 2024-12-04 PROCEDURE — 99386 PREV VISIT NEW AGE 40-64: CPT | Performed by: PHYSICIAN ASSISTANT

## 2024-12-04 PROCEDURE — 36415 COLL VENOUS BLD VENIPUNCTURE: CPT | Performed by: PHYSICIAN ASSISTANT

## 2024-12-04 PROCEDURE — 80048 BASIC METABOLIC PNL TOTAL CA: CPT | Performed by: PHYSICIAN ASSISTANT

## 2024-12-04 PROCEDURE — 99459 PELVIC EXAMINATION: CPT | Performed by: PHYSICIAN ASSISTANT

## 2024-12-04 NOTE — PROGRESS NOTES
[facility]    Gynecology Office Note    CC:   Chief Complaint   Patient presents with    Gyn Exam     pap      HPI: Era Garland is a 44 year old  who presents for pap smear. She states her insurance covers it yearly and with her history she would like to get it done yearly.     She would also like her calcium level rechecked as it was slightly elevated in May 2024.      GYN Hx:     No LMP recorded. (Menstrual status: IUD).    Contraception: IUD Mirena: placed 2020.   Last Pap Smear:   Lab Results   Component Value Date    PAP NIL 2020     Abnormal Pap Smears: positive history of abnormal pap smears and LEEP x2 in the past.   Sexual Activity: currently sexually active with one man    ROS: A 10 pt ROS was completed and found to be otherwise negative unless mentioned in the HPI.     PMH:   Past Medical History:   Diagnosis Date    Anxiety     Chickenpox     Depression     GERD (gastroesophageal reflux disease)     HPV (human papilloma virus) infection     IBS (irritable bowel syndrome)     Intermittent asthma     Papanicolaou smear of cervix with low grade squamous intraepithelial lesion (LGSIL) 2018    S/P LEEP of cervix age 24    path results?    Urinary tract infections        PSHx:   Past Surgical History:   Procedure Laterality Date    LAPAROSCOPIC APPENDECTOMY  2012    Procedure: LAPAROSCOPIC APPENDECTOMY;  Laparoscopic Appendectomy;  Surgeon: Jorge Luis Tan MD;  Location: [facility] OR    LEEP TX, CERVICAL  age 24    MOUTH SURGERY      wisdom teeth       OBHx:   OB History    Para Term  AB Living   3 3 3 0 0 3   SAB IAB Ectopic Multiple Live Births   0 0 0 0 3      # Outcome Date GA Lbr Oleg/2nd Weight Sex Type Anes PTL Lv   3 Term 13 37w2d 05:45 / 00:08 3.345 kg (7 lb 6 oz) F Vag-Spont EPI  CHAY      Birth Comments: Passed hearing  Passed pulse ox screening  GBS positive, treated  NBS normal      Name: ELLEN,GIRL1 ERA       Apgar1: 8  Apgar5: 9   2 Term 08/21/10 37w0d 24:00 3.856 kg (8 lb 8 oz) F    CHAY   1 Term 08 39w0d 20:00 3.459 kg (7 lb 10 oz) M    CHAY       Medications:   Current Outpatient Medications   Medication Sig Dispense Refill    adapalene (DIFFERIN) 0.1 % external cream Apply topically at bedtime 45 g 11    albuterol (PROAIR HFA/PROVENTIL HFA/VENTOLIN HFA) 108 (90 Base) MCG/ACT inhaler Inhale 2 puffs into the lungs every 6 hours as needed for shortness of breath or wheezing 18 g 3    ALPRAZolam (XANAX) 0.5 MG tablet Take 1 tablet (0.5 mg) by mouth nightly as needed for anxiety or sleep 30 tablet 0    amphetamine-dextroamphetamine (ADDERALL XR) 20 MG 24 hr capsule 40 mg      Ascorbic Acid (VITAMIN C PO) Take 1,000 mg by mouth daily.      Cholecalciferol (VITAMIN D3 PO) Take 2,000 Units by mouth daily.      DULoxetine HCl 40 MG CPEP 40 mg in the morning and 30 mg in the evening.      guanFACINE (INTUNIV) 1 MG TB24 24 hr tablet TAKE 1 TABLET BY MOUTH AT BEDTIME 90 tablet 1    ibuprofen (ADVIL,MOTRIN) 400 MG tablet Take 1-2 tablets by mouth every 6 hours as needed (cramping). 120 tablet 0    Lactobacillus (ACIDOPHILUS PO) Take 1 tablet by mouth daily.      levonorgestrel (MIRENA) 20 MCG/24HR IUD 1 each (20 mcg) by Intrauterine route once      Pseudoephedrine HCl (SUDAFED 12 HOUR PO) Take 1 tablet by mouth once.      spironolactone (ALDACTONE) 100 MG tablet Take 1 tablet (100 mg) by mouth 2 times daily 180 tablet 3    ZOLMitriptan (ZOMIG) 5 MG tablet Take 1 tablet (5 mg) by mouth at onset of headache for migraine May repeat in 2 hours. Max 2 tablets/24 hours. 6 tablet 5    amphetamine-dextroamphetamine (ADDERALL) 5 MG tablet TAKE 1 TABLET DAILY, NO LATER THAN 2PM. (Patient not taking: Reported on 2024)      calcium carbonate-vitamin D 600-400 MG-UNIT CHEW Take 1 chew tab by mouth daily (Patient not taking: Reported on 2024)       No current facility-administered medications for this visit.        Allergies:      Allergies   Allergen Reactions    Amoxicillin-Pot Clavulanate Diarrhea    Diflucan Blisters and Rash       Social History:   Social History     Socioeconomic History    Marital status:      Spouse name: Not on file    Number of children: Not on file    Years of education: Not on file    Highest education level: Not on file   Occupational History    Not on file   Tobacco Use    Smoking status: Never    Smokeless tobacco: Never   Vaping Use    Vaping status: Never Used   Substance and Sexual Activity    Alcohol use: Yes     Comment: casual    Drug use: No    Sexual activity: Yes     Partners: Male     Birth control/protection: I.U.D.   Other Topics Concern    Parent/sibling w/ CABG, MI or angioplasty before 65F 55M? Not Asked   Social History Narrative    Not on file     Social Drivers of Health     Financial Resource Strain: Low Risk  (5/23/2024)    Financial Resource Strain     Within the past 12 months, have you or your family members you live with been unable to get utilities (heat, electricity) when it was really needed?: No   Food Insecurity: Low Risk  (5/23/2024)    Food Insecurity     Within the past 12 months, did you worry that your food would run out before you got money to buy more?: No     Within the past 12 months, did the food you bought just not last and you didn t have money to get more?: No   Transportation Needs: Low Risk  (5/23/2024)    Transportation Needs     Within the past 12 months, has lack of transportation kept you from medical appointments, getting your medicines, non-medical meetings or appointments, work, or from getting things that you need?: No   Physical Activity: Insufficiently Active (5/23/2024)    Exercise Vital Sign     Days of Exercise per Week: 2 days     Minutes of Exercise per Session: 30 min   Stress: Stress Concern Present (5/23/2024)    [employer] [place] - Occupational Stress Questionnaire     Feeling of Stress : To some  "extent   Social Connections: Unknown (5/23/2024)    Social Connection and Isolation Panel [NHANES]     Frequency of Communication with Friends and Family: Not on file     Frequency of Social Gatherings with Friends and Family: Once a week     Attends Nondenominational Services: Not on file     Active Member of Clubs or Organizations: Not on file     Attends Club or Organization Meetings: Not on file     Marital Status: Not on file   Interpersonal Safety: Low Risk  (5/23/2024)    Interpersonal Safety     Do you feel physically and emotionally safe where you currently live?: Yes     Within the past 12 months, have you been hit, slapped, kicked or otherwise physically hurt by someone?: No     Within the past 12 months, have you been humiliated or emotionally abused in other ways by your partner or ex-partner?: No   Housing Stability: Low Risk  (5/23/2024)    Housing Stability     Do you have housing? : Yes     Are you worried about losing your housing?: No         Family History:   Family History   Problem Relation Age of Onset    Depression Mother     Gastrointestinal Disease Mother     Migraines Mother     Hyperlipidemia Mother     Allergies Father     Hypertension Father     Parkinsonism Father     Asthma Brother     Depression Brother     Asthma Brother     Alcohol/Drug Maternal Grandmother     Cardiovascular Maternal Grandfather         MI    Eye Disorder Paternal Grandmother     Osteoporosis Paternal Grandmother     Hypertension Paternal Grandmother     Alzheimer Disease Paternal Grandfather     Attention Deficit Disorder Daughter     Attention Deficit Disorder Son        Physical Exam:   Vitals:    12/04/24 1423   BP: 112/71   BP Location: Right arm   Patient Position: Chair   Cuff Size: Adult Regular   Pulse: 106   Resp: 18   Temp: 98.7  F (37.1  C)   TempSrc: Tympanic   Weight: 63.5 kg (140 lb)   Height: 1.784 m (5' 10.25\")      Estimated body mass index is 19.95 kg/m  as calculated from the following:    Height as of " "this encounter: 1.784 m (5' 10.25\").    Weight as of this encounter: 63.5 kg (140 lb).    General appearance: well-hydrated, A&O x 3, no apparent distress  Lungs: breathing comfortably on room air.   Heart: warm and well perfused  Constitutional: See vitals  Abdomen: Soft, non-tender, non-distended. No rebound, rigidity, or guarding.  Neuro: CN II-XII grossly intact  Genitourinary:  External genitalia: no erythema, no lesions.   Urethral meatus appropriate location without lesions or prolapse  Urethra: No masses, tenderness, or scarring  Bladder no fullness, masses, or tenderness.  Anus and Perineum: Unremarkable, no visible lesions  Vagina: Normal, healthy pink mucosa without any lesions. Physiologic vaginal discharge. Pap smear obtained.   Cervix: normal appearance, no cervical motion tenderness. IUD strings present and visualized   Uterus: normal size, shape and consistency.   Adnexa: no masses or tenderness bilaterally.      Labs/Imaging: reviewed.     Assessment and Plan:     (Z01.419) Encounter for gynecological examination without abnormal finding  Comment: patient requesting yearly pap smear: pap smear obtained today and sent to lab.   Plan: yearly exam.     (E83.52) Serum calcium elevated  (primary encounter diagnosis)  Comment: history of elevated calcium in May on labs: patient would like to have this rechecked today.   Plan: Basic metabolic panel  (Ca, Cl, CO2, Creat,         Gluc, K, Na, BUN)          Return to clinic in 1 year.    Kimber Heck PA-C     "

## 2024-12-04 NOTE — NURSING NOTE
"Initial /71 (BP Location: Right arm, Patient Position: Chair, Cuff Size: Adult Regular)   Pulse 106   Temp 98.7  F (37.1  C) (Tympanic)   Resp 18   Ht 1.784 m (5' 10.25\")   Wt 63.5 kg (140 lb)   BMI 19.95 kg/m   Estimated body mass index is 19.95 kg/m  as calculated from the following:    Height as of this encounter: 1.784 m (5' 10.25\").    Weight as of this encounter: 63.5 kg (140 lb). .    "

## 2024-12-05 LAB
HPV HR 12 DNA CVX QL NAA+PROBE: NEGATIVE
HPV16 DNA CVX QL NAA+PROBE: NEGATIVE
HPV18 DNA CVX QL NAA+PROBE: NEGATIVE
HUMAN PAPILLOMA VIRUS FINAL DIAGNOSIS: NORMAL

## 2024-12-09 LAB
BKR AP ASSOCIATED HPV REPORT: NORMAL
BKR LAB AP GYN ADEQUACY: NORMAL
BKR LAB AP GYN INTERPRETATION: NORMAL
BKR LAB AP PREVIOUS ABNORMAL: NORMAL
PATH REPORT.COMMENTS IMP SPEC: NORMAL
PATH REPORT.COMMENTS IMP SPEC: NORMAL
PATH REPORT.RELEVANT HX SPEC: NORMAL

## 2025-02-19 ENCOUNTER — ORDERS ONLY (AUTO-RELEASED) (OUTPATIENT)
Dept: CARDIOLOGY | Facility: CLINIC | Age: 45
End: 2025-02-19

## 2025-02-19 ENCOUNTER — OFFICE VISIT (OUTPATIENT)
Dept: CARDIOLOGY | Facility: CLINIC | Age: 45
End: 2025-02-19
Payer: COMMERCIAL

## 2025-02-19 VITALS
BODY MASS INDEX: 20.44 KG/M2 | WEIGHT: 146 LBS | HEART RATE: 92 BPM | HEIGHT: 71 IN | DIASTOLIC BLOOD PRESSURE: 52 MMHG | SYSTOLIC BLOOD PRESSURE: 96 MMHG

## 2025-02-19 DIAGNOSIS — R00.2 PALPITATIONS: ICD-10-CM

## 2025-02-19 DIAGNOSIS — R00.2 PALPITATIONS: Primary | ICD-10-CM

## 2025-02-19 LAB
ATRIAL RATE - MUSE: 85 BPM
DIASTOLIC BLOOD PRESSURE - MUSE: NORMAL MMHG
ERYTHROCYTE [DISTWIDTH] IN BLOOD BY AUTOMATED COUNT: 11.9 % (ref 10–15)
HCT VFR BLD AUTO: 37.5 % (ref 35–47)
HGB BLD-MCNC: 12.7 G/DL (ref 11.7–15.7)
INTERPRETATION ECG - MUSE: NORMAL
MCH RBC QN AUTO: 32 PG (ref 26.5–33)
MCHC RBC AUTO-ENTMCNC: 33.9 G/DL (ref 31.5–36.5)
MCV RBC AUTO: 95 FL (ref 78–100)
P AXIS - MUSE: 67 DEGREES
PLATELET # BLD AUTO: 262 10E3/UL (ref 150–450)
PR INTERVAL - MUSE: 94 MS
QRS DURATION - MUSE: 82 MS
QT - MUSE: 358 MS
QTC - MUSE: 426 MS
R AXIS - MUSE: 79 DEGREES
RBC # BLD AUTO: 3.97 10E6/UL (ref 3.8–5.2)
SYSTOLIC BLOOD PRESSURE - MUSE: NORMAL MMHG
T AXIS - MUSE: 61 DEGREES
TSH SERPL DL<=0.005 MIU/L-ACNC: 1.29 UIU/ML (ref 0.3–4.2)
VENTRICULAR RATE- MUSE: 85 BPM
WBC # BLD AUTO: 5.1 10E3/UL (ref 4–11)

## 2025-02-19 PROCEDURE — 99204 OFFICE O/P NEW MOD 45 MIN: CPT | Performed by: STUDENT IN AN ORGANIZED HEALTH CARE EDUCATION/TRAINING PROGRAM

## 2025-02-19 PROCEDURE — G2211 COMPLEX E/M VISIT ADD ON: HCPCS | Performed by: STUDENT IN AN ORGANIZED HEALTH CARE EDUCATION/TRAINING PROGRAM

## 2025-02-19 PROCEDURE — 85027 COMPLETE CBC AUTOMATED: CPT | Performed by: STUDENT IN AN ORGANIZED HEALTH CARE EDUCATION/TRAINING PROGRAM

## 2025-02-19 PROCEDURE — 36415 COLL VENOUS BLD VENIPUNCTURE: CPT | Performed by: STUDENT IN AN ORGANIZED HEALTH CARE EDUCATION/TRAINING PROGRAM

## 2025-02-19 PROCEDURE — 84443 ASSAY THYROID STIM HORMONE: CPT | Performed by: STUDENT IN AN ORGANIZED HEALTH CARE EDUCATION/TRAINING PROGRAM

## 2025-02-19 PROCEDURE — 93000 ELECTROCARDIOGRAM COMPLETE: CPT | Performed by: INTERNAL MEDICINE

## 2025-02-19 NOTE — LETTER
2/19/2025    Nyasia Davila, NP  [address] [facility] [address]    RE: Era Garland       Dear Colleague,     I had the pleasure of seeing Era Garland in the [facility].  Cardiology Clinic    Era Garland is a 44 year old with a history of migraines, hyperlipidemia, and cystic acne on spironolactone who presents for evaluation of palpitations and chest discomfort.    For some years Naomi has had weekly palpitations that last for 1 to 2 minutes.  They are not brought on with exertion and not associated with shortness of breath, syncope, or presyncope.  She feels a fluttering in her chest occasionally going up to her neck.  These episodes have not become more common.  She has not noticed any triggers.    More recently, she has started to have chest discomfort that last for 10 to 20 minutes.  It occurs predominantly at rest and is not associated with meals or exertion.  She does not have associated nausea, diaphoresis, shortness of breath.  She is able to exert herself such as going up flights of stairs or going to the gym without any chest pain, palpitations, shortness of breath, syncope, or presyncope.    General: Well appearing, appears stated age.  CV: Normal rate and rhythm. No m/r/g. No JVD.   Pulm: Normal effort. Normal breath sounds.  Lower extremities: No lower extremity edema.    Labs:   Reviewed in epic.  Creatinine 0.78    Creatinine 0.78    Testing:  EKG 2023: Normal sinus rhythm, short GA, no significant ST or T wave abnormalities (my interpretation)    Assessment and Plan:    1.  Chest discomfort.  Appears nonanginal and her baseline risk is extremely low.  Would not recommend any further testing at this time.    2.  Palpitations.  Will get a Zio patch, EKG today, and TTE for further evaluation.  Laboratory evaluation for reversible causes.    3.  Hyperlipidemia.  Recent .  ASCVD risk still remains extremely low, less than 1% given her extremely low  baseline risk.  Can rediscuss as the years roll-on.    The longitudinal plan of care for the diagnosis(es)/condition(s) as documented were addressed during this visit. Due to the added complexity in care, I will continue to support Era in the subsequent management and with ongoing continuity of care.    Return if symptoms worsen or fail to improve.    Saurav Durant MD  Interventional Cardiology      Thank you for allowing me to participate in the care of your patient.      Sincerely,     Saurav Durant MD     [facility]  cc:   Saurav Durant MD  [address] [place] [address]

## 2025-02-19 NOTE — PATIENT INSTRUCTIONS
Ziopatch monitor and ultrasound to evaluate your heart function and rhythm.    Labs and EKG today.

## 2025-02-19 NOTE — PROGRESS NOTES
Cardiology Clinic    Era Garland is a 44 year old with a history of migraines, hyperlipidemia, and cystic acne on spironolactone who presents for evaluation of palpitations and chest discomfort.    For some years Naomi has had weekly palpitations that last for 1 to 2 minutes.  They are not brought on with exertion and not associated with shortness of breath, syncope, or presyncope.  She feels a fluttering in her chest occasionally going up to her neck.  These episodes have not become more common.  She has not noticed any triggers.    More recently, she has started to have chest discomfort that last for 10 to 20 minutes.  It occurs predominantly at rest and is not associated with meals or exertion.  She does not have associated nausea, diaphoresis, shortness of breath.  She is able to exert herself such as going up flights of stairs or going to the gym without any chest pain, palpitations, shortness of breath, syncope, or presyncope.    General: Well appearing, appears stated age.  CV: Normal rate and rhythm. No m/r/g. No JVD.   Pulm: Normal effort. Normal breath sounds.  Lower extremities: No lower extremity edema.    Labs:   Reviewed in epic.  Creatinine 0.78    Creatinine 0.78    Testing:  EKG 2023: Normal sinus rhythm, short MS, no significant ST or T wave abnormalities (my interpretation)    Assessment and Plan:    1.  Chest discomfort.  Appears nonanginal and her baseline risk is extremely low.  Would not recommend any further testing at this time.    2.  Palpitations.  Will get a Zio patch, EKG today, and TTE for further evaluation.  Laboratory evaluation for reversible causes.    3.  Hyperlipidemia.  Recent .  ASCVD risk still remains extremely low, less than 1% given her extremely low baseline risk.  Can rediscuss as the years roll-on.    The longitudinal plan of care for the diagnosis(es)/condition(s) as documented were addressed during this visit. Due to the added complexity in care, I  will continue to support Era in the subsequent management and with ongoing continuity of care.    Return if symptoms worsen or fail to improve.    Saurav Durant MD  Interventional Cardiology

## 2025-03-03 DIAGNOSIS — L70.0 ACNE VULGARIS: ICD-10-CM

## 2025-03-04 RX ORDER — SPIRONOLACTONE 100 MG/1
100 TABLET, FILM COATED ORAL 2 TIMES DAILY
Qty: 180 TABLET | Refills: 1 | Status: SHIPPED | OUTPATIENT
Start: 2025-03-04

## 2025-03-05 DIAGNOSIS — L70.0 ACNE VULGARIS: ICD-10-CM

## 2025-03-05 NOTE — TELEPHONE ENCOUNTER
Pending Prescriptions:                       Disp   Refills    spironolactone (ALDACTONE) 100 MG tablet  180 ta*1            Sig: Take 1 tablet (100 mg) by mouth 2 times daily.

## 2025-03-06 RX ORDER — SPIRONOLACTONE 100 MG/1
100 TABLET, FILM COATED ORAL 2 TIMES DAILY
Qty: 180 TABLET | Refills: 1 | OUTPATIENT
Start: 2025-03-06

## 2025-03-14 ENCOUNTER — HOSPITAL ENCOUNTER (OUTPATIENT)
Dept: CARDIOLOGY | Facility: HOSPITAL | Age: 45
Discharge: HOME | End: 2025-03-14
Attending: STUDENT IN AN ORGANIZED HEALTH CARE EDUCATION/TRAINING PROGRAM | Admitting: STUDENT IN AN ORGANIZED HEALTH CARE EDUCATION/TRAINING PROGRAM
Payer: COMMERCIAL

## 2025-03-14 DIAGNOSIS — R00.2 PALPITATIONS: ICD-10-CM

## 2025-03-14 LAB — LVEF ECHO: NORMAL

## 2025-03-14 PROCEDURE — 93306 TTE W/DOPPLER COMPLETE: CPT

## 2025-03-14 PROCEDURE — 93306 TTE W/DOPPLER COMPLETE: CPT | Mod: 26 | Performed by: INTERNAL MEDICINE

## 2025-03-17 DIAGNOSIS — Q22.5 EBSTEIN ANOMALY: Primary | ICD-10-CM

## 2025-03-20 LAB — CV ZIO PRELIM RESULTS: NORMAL

## 2025-04-02 ENCOUNTER — ANCILLARY ORDERS (OUTPATIENT)
Dept: FAMILY MEDICINE | Facility: CLINIC | Age: 45
End: 2025-04-02
Payer: COMMERCIAL

## 2025-04-02 DIAGNOSIS — Z12.31 VISIT FOR SCREENING MAMMOGRAM: Primary | ICD-10-CM

## 2025-04-03 ENCOUNTER — TRANSCRIBE ORDERS (OUTPATIENT)
Dept: OTHER | Age: 45
End: 2025-04-03

## 2025-04-03 ENCOUNTER — MEDICAL CORRESPONDENCE (OUTPATIENT)
Dept: HEALTH INFORMATION MANAGEMENT | Facility: CLINIC | Age: 45
End: 2025-04-03
Payer: COMMERCIAL

## 2025-04-03 DIAGNOSIS — G47.00 INSOMNIA: Primary | ICD-10-CM

## 2025-04-09 ENCOUNTER — HOSPITAL ENCOUNTER (OUTPATIENT)
Dept: MRI IMAGING | Facility: CLINIC | Age: 45
Discharge: HOME | End: 2025-04-09
Attending: STUDENT IN AN ORGANIZED HEALTH CARE EDUCATION/TRAINING PROGRAM
Payer: COMMERCIAL

## 2025-04-09 DIAGNOSIS — Q22.5 EBSTEIN ANOMALY: ICD-10-CM

## 2025-04-09 PROCEDURE — A9585 GADOBUTROL INJECTION: HCPCS | Performed by: STUDENT IN AN ORGANIZED HEALTH CARE EDUCATION/TRAINING PROGRAM

## 2025-04-09 PROCEDURE — 255N000002 HC RX 255 OP 636: Performed by: STUDENT IN AN ORGANIZED HEALTH CARE EDUCATION/TRAINING PROGRAM

## 2025-04-09 PROCEDURE — 75561 CARDIAC MRI FOR MORPH W/DYE: CPT

## 2025-04-09 PROCEDURE — 75561 CARDIAC MRI FOR MORPH W/DYE: CPT | Mod: 26 | Performed by: INTERNAL MEDICINE

## 2025-04-09 RX ORDER — GADOBUTROL 604.72 MG/ML
8 INJECTION INTRAVENOUS ONCE
Status: COMPLETED | OUTPATIENT
Start: 2025-04-09 | End: 2025-04-09

## 2025-04-09 RX ADMIN — GADOBUTROL 8 ML: 604.72 INJECTION INTRAVENOUS at 15:54

## 2025-04-21 ENCOUNTER — MYC MEDICAL ADVICE (OUTPATIENT)
Dept: FAMILY MEDICINE | Facility: CLINIC | Age: 45
End: 2025-04-21
Payer: COMMERCIAL

## 2025-04-21 DIAGNOSIS — Z12.31 VISIT FOR SCREENING MAMMOGRAM: ICD-10-CM

## 2025-04-21 DIAGNOSIS — N64.4 BREAST PAIN: Primary | ICD-10-CM

## 2025-04-22 NOTE — TELEPHONE ENCOUNTER
Patient states that she went to her screening mammogram and mentioned a pain she was having in the left breast at 11 o'clock but it was not significant and did not bring it up at our clinic visit.  She is not certain that there is a lump since she has dense breasts.  Put in diagnostic mammogram and left breast US for further evaluation.   Advised patient this may change insurance coverage and that she can reschedule.  Nyasia Davila NP on 4/22/2025 at 10:06 AM

## 2025-04-29 ASSESSMENT — SLEEP AND FATIGUE QUESTIONNAIRES
HOW LIKELY ARE YOU TO NOD OFF OR FALL ASLEEP WHILE SITTING AND READING: HIGH CHANCE OF DOZING
HOW LIKELY ARE YOU TO NOD OFF OR FALL ASLEEP WHEN YOU ARE A PASSENGER IN A CAR FOR AN HOUR WITHOUT A BREAK: WOULD NEVER DOZE
HOW LIKELY ARE YOU TO NOD OFF OR FALL ASLEEP WHILE WATCHING TV: SLIGHT CHANCE OF DOZING
HOW LIKELY ARE YOU TO NOD OFF OR FALL ASLEEP WHILE SITTING INACTIVE IN A PUBLIC PLACE: WOULD NEVER DOZE
HOW LIKELY ARE YOU TO NOD OFF OR FALL ASLEEP WHILE SITTING QUIETLY AFTER LUNCH WITHOUT ALCOHOL: MODERATE CHANCE OF DOZING
HOW LIKELY ARE YOU TO NOD OFF OR FALL ASLEEP WHILE LYING DOWN TO REST IN THE AFTERNOON WHEN CIRCUMSTANCES PERMIT: HIGH CHANCE OF DOZING
HOW LIKELY ARE YOU TO NOD OFF OR FALL ASLEEP IN A CAR, WHILE STOPPED FOR A FEW MINUTES IN TRAFFIC: WOULD NEVER DOZE
HOW LIKELY ARE YOU TO NOD OFF OR FALL ASLEEP WHILE SITTING AND TALKING TO SOMEONE: WOULD NEVER DOZE

## 2025-04-30 NOTE — PROGRESS NOTES
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   ALT 28       TSH   Date Value   2025 1.29 uIU/mL   2022 1.28 mU/L       No results found for: \"UAMP\", \"UBARB\", \"BENZODIAZEUR\", \"UCANN\", \"UCOC\", \"OPIT\", \"UPCP\"    No results found for: \"IRONSAT\", \"UT78827\", \"CARLOTTA\"    No results found for: \"PH\", \"PHARTERIAL\", \"PO2\", \"SN0NZTQAHZE\", \"SAT\", \"PCO2\", \"HCO3\", \"BASEEXCESS\", \"EARLENE\", \"BEB\"    @LABRCNTIPR(phv:4,pco2v:4,po2v:4,hco3v:4,teo:4,o2per:4)@    Echocardiology:   [place] [address]     Name: EFRAIN HUGHES  MRN: [id number]  : 1980  Study Date: 2025 09:03 AM  Age: 44 yrs  Gender: Female  Patient Location: [facility]  Reason For Study: Palpitations  Ordering Physician: SWATI DOLL  Referring Physician: GEURINK, SWATI  Performed By: ELEAZAR     BSA: 1.8 m2  Height: 71 in  Weight: 146 lb  HR: 105  BP: 101/55 mmHg  ______________________________________________________________________________  Procedure  Echocardiogram with two-dimensional, color and spectral Doppler.  ______________________________________________________________________________  Interpretation Summary     The left ventricle is normal in size.  The visual ejection fraction is 60-65%.  No regional wall motion " abnormalities noted.  Normal right ventricle size and systolic function.  Apical leaflet displacement raises possibility of Ebstein's anomaly.  Borderline apical displacement of septal leaflet of TV. Consider cardiac MRI  for better visualization.  IVC diameter and respiratory changes fall into an intermediate range  suggesting an RA pressure of 8 mmHg.  The rhythm was sinus tachycardia.  Trivial pericardial effusion  There is no comparison study available.  ______________________________________________________________________________  Left Ventricle  The left ventricle is normal in size. There is normal left ventricular wall  thickness. The visual ejection fraction is 60-65%. No regional wall motion  abnormalities noted.     Right Ventricle  Normal right ventricle size and systolic function. TAPSE is normal, which is  consistent with normal right ventricular systolic function.     Atria  Normal left atrial size. Right atrial size is normal.     Mitral Valve  Mitral valve leaflets appear normal. There is mild (1+) mitral regurgitation.  There is no mitral valve stenosis.     Tricuspid Valve  Apical leaflet displacement raises possibility of Ebstein's anomaly.  Borderline apical displacement of septal leaflet of TV. Consider cardiac MRI  for better visualization. There is mild (1+) tricuspid regurgitation. The  right ventricular systolic pressure is approximated at 28mmHg plus the right  atrial pressure.     Aortic Valve  The aortic valve is not well visualized. No aortic regurgitation is present.  No aortic stenosis is present.     Pulmonic Valve  The pulmonic valve is not well visualized. There is no pulmonic valvular  regurgitation. There is no pulmonic valvular stenosis.     Vessels  The aorta root is normal. Normal size ascending aorta. IVC diameter and  respiratory changes fall into an intermediate range suggesting an RA pressure  of 8 mmHg.     Pericardium  Trivial pericardial effusion.     Rhythm  The rhythm  was sinus tachycardia.        Chest x-ray: No results found for this or any previous visit from the past 365 days.      Chest CT: No results found for this or any previous visit from the past 365 days.      PFT: Most Recent [facility] Pulmonary Function Testing        CAYDEN Rivera CNP 4/30/2025   Sleep Medicine

## 2025-05-01 ENCOUNTER — VIRTUAL VISIT (OUTPATIENT)
Dept: SLEEP MEDICINE | Facility: CLINIC | Age: 45
End: 2025-05-01
Payer: COMMERCIAL

## 2025-05-01 VITALS — HEIGHT: 71 IN | WEIGHT: 140 LBS | BODY MASS INDEX: 19.6 KG/M2

## 2025-05-01 DIAGNOSIS — R06.81 APNEA: ICD-10-CM

## 2025-05-01 DIAGNOSIS — R29.818 SUSPECTED SLEEP APNEA: Primary | ICD-10-CM

## 2025-05-01 DIAGNOSIS — F51.04 PSYCHOPHYSIOLOGICAL INSOMNIA: ICD-10-CM

## 2025-05-01 DIAGNOSIS — R00.2 PALPITATIONS: ICD-10-CM

## 2025-05-01 DIAGNOSIS — F90.2 ATTENTION DEFICIT HYPERACTIVITY DISORDER (ADHD), COMBINED TYPE: ICD-10-CM

## 2025-05-01 PROBLEM — E78.2 MIXED HYPERLIPIDEMIA: Status: ACTIVE | Noted: 2025-05-01

## 2025-05-01 ASSESSMENT — PAIN SCALES - GENERAL: PAINLEVEL_OUTOF10: NO PAIN (0)

## 2025-05-01 NOTE — NURSING NOTE
Current patient location: [address]  [facility] [address]    Is the patient currently in the state of [place]? YES    Visit mode: VIDEO    If the visit is dropped, the patient can be reconnected by:VIDEO VISIT: Text to cell phone:   Telephone Information:   Mobile [phone]       Will anyone else be joining the visit? NO  (If patient encounters technical issues they should call [phone]    Are changes needed to the allergy or medication list? No    Are refills needed on medications prescribed by this physician? NO    Rooming Documentation:  Questionnaire(s) completed    Reason for visit: Consult    Raffaele CATALANF

## 2025-05-01 NOTE — PATIENT INSTRUCTIONS
"          MY TREATMENT INFORMATION FOR SLEEP APNEA-  Era Garland    DOCTOR : CAYDNE Rivera CNP    Am I having a sleep study at a sleep center?  --->Due to normal delays, you will be contacted within 2-4 weeks to schedule    Am I having a home sleep study?  --->Watch the video for the device you are using:    -/drop off device-   https://www.[employer].com/watch?v=yGGFBdELGhk    -Disposable device sent out require phone/computer application-   https://www.[employer].com/watch?v=BCce_vbiwxE      Frequently asked questions:  1. What is Obstructive Sleep Apnea (RAFA)? RAFA is the most common type of sleep apnea. Apnea means, \"without breath.\"  Apnea is most often caused by narrowing or collapse of the upper airway as muscles relax during sleep.   Almost everyone has occasional apneas. Most people with sleep apnea have had brief interruptions at night frequently for many years.  The severity of sleep apnea is related to how frequent and severe the events are.   2. What are the consequences of RAFA? Symptoms include: feeling sleepy during the day, snoring loudly, gasping or stopping of breathing, trouble sleeping, and occasionally morning headaches or heartburn at night.  Sleepiness can be serious and even increase the risk of falling asleep while driving. Other health consequences may include development of high blood pressure and other cardiovascular disease in persons who are susceptible. Untreated RAFA  can contribute to heart disease, stroke and diabetes.   3. What are the treatment options? In most situations, sleep apnea is a lifelong disease that must be managed with daily therapy. Medications are not effective for sleep apnea and surgery is generally not considered until other therapies have been tried. Your treatment is your choice . Continuous Positive Airway (CPAP) works right away and is the therapy that is effective in nearly everyone. An oral device to hold your jaw forward is usually the next " most reliable option. Other options include postioning devices (to keep you off your back), weight loss, and surgery including a tongue pacing device. There is more detail about some of these options below.  4. Are my sleep studies covered by insurance? Although we will request verification of coverage, we advise you also check in advance of the study to ensure there is coverage.    Important tips for those choosing CPAP and similar devices  REMEMBER-IF YOU RECEIVE A CALL FROM  [phone]-->IT IS TO SETUP A DEVICE  For new devices, sign up for device VIDYA to monitor your device for your followup visits  We encourage you to utilize the [employer] vidya or website ( https://[employer].Whereoscope/ ) to monitor your therapy progress and share the data with your healthcare team when you discuss your sleep apnea.                                                    Know your equipment:  CPAP is continuous positive airway pressure that prevents obstructive sleep apnea by keeping the throat from collapsing while you are sleeping. In most cases, the device is  smart  and can slowly self-adjusts if your throat collapses and keeps a record every day of how well you are treated-this information is available to you and your care team.  BPAP is bilevel positive airway pressure that keeps your throat open and also assists each breath with a pressure boost to maintain adequate breathing.  Special kinds of BPAP are used in patients who have inadequate breathing from lung or heart disease. In most cases, the device is  smart  and can slowly self-adjusts to assist breathing. Like CPAP, the device keeps a record of how well you are treated.  Your mask is your connection to the device. You get to choose what feels most comfortable and the staff will help to make sure if fits. Here: are some examples of the different masks that are available: Magnetic mask aids may assist with use but there are safety issues that should be addressed when  considering with magnets* ( see end of discussion).       Key points to remember on your journey with sleep apnea:  Sleep study.  PAP devices often need to be adjusted during a sleep study to show that they are effective and adjusted right.  Good tips to remember: Try wearing just the mask during a quiet time during the day so your body adapts to wearing it. A humidifier is recommended for comfort in most cases to prevent drying of your nose and throat. Allergy medication from your provider may help you if you are having nasal congestion.  Getting settled-in. It takes more than one night for most of us to get used to wearing a mask. Try wearing just the mask during a quiet time during the day so your body adapts to wearing it. A humidifier is recommended for comfort in most cases. Our team will work with you carefully on the first day and will be in contact within 4 days and again at 2 and 4 weeks for advice and remote device adjustments. Your therapy is evaluated by the device each day.   Use it every night. The more you are able to sleep naturally for 7-8 hours, the more likely you will have good sleep and to prevent health risks or symptoms from sleep apnea. Even if you use it 4 hours it helps. Occasionally all of us are unable to use a medical therapy, in sleep apnea, it is not dangerous to miss one night.   Communicate. Call our skilled team on the number provided on the first day if your visit for problems that make it difficult to wear the device. Over 2 out of 3 patients can learn to wear the device long-term with help from our team. Remember to call our team or your sleep providers if you are unable to wear the device as we may have other solutions for those who cannot adapt to mask CPAP therapy. It is recommended that you sleep your sleep provider within the first 3 months and yearly after that if you are not having problems.   Use it for your health. We encourage use of CPAP masks during daytime quiet  periods to allow your face and brain to adapt to the sensation of CPAP so that it will be a more natural sensation to awaken to at night or during naps. This can be very useful during the first few weeks or months of adapting to CPAP though it does not help medically to wear CPAP during wakefulness and  should not be used as a strategy just to meet guidelines.  Take care of your equipment. Make sure you clean your mask and tubing using directions every day and that your filter and mask are replaced as recommended or if they are not working.     *Masks with magnets:  Updated Contraindications  Masks with magnetic components are contraindicated for use by patients where they, or anyone in close physical contact while using the mask, have the following:   Active medical implants that interact with magnets (i.e., pacemakers, implantable cardioverter defibrillators (ICD), neurostimulators, cerebrospinal fluid (CSF) shunts, insulin/infusion pumps)   Metallic implants/objects containing ferromagnetic material (i.e., aneurysm clips/flow disruption devices, embolic coils, stents, valves, electrodes, implants to restore hearing or balance with implanted magnets, ocular implants, metallic splinters in the eye)  Updated Warning  Keep the mask magnets at a safe distance of at least 6 inches (150 mm) away from implants or medical devices that may be adversely affected by magnetic interference. This warning applies to you or anyone in close physical contact with your mask. The magnets are in the frame and lower headgear clips, with a magnetic field strength of up to 400mT. When worn, they connect to secure the mask but may inadvertently detach while asleep.  Implants/medical devices, including those listed within contraindications, may be adversely affected if they change function under external magnetic fields or contain ferromagnetic materials that attract/repel to magnetic fields (some metallic implants, e.g., contact lenses  with metal, dental implants, metallic cranial plates, screws, samson hole covers, and bone substitute devices). Consult your physician and  of your implant / other medical device for information on the potential adverse effects of magnetic fields.    BESIDES CPAP, WHAT OTHER THERAPIES ARE THERE?    Positioning Device  Positioning devices are generally used when sleep apnea is mild and only occurs on your back.This example shows a pillow that straps around the waist. It may be appropriate for those whose sleep study shows milder sleep apnea that occurs primarily when lying flat on one's back. Preliminary studies have shown benefit but effectiveness at home may need to be verified by a home sleep test. These devices are generally not covered by medical insurance.  Examples of devices that maintain sleeping on the back to prevent snoring and mild sleep apnea.    Belt type body positioner  http://Foodtoeat/    Electronic reminder  http://nightshifttherapy.[employer]/            Oral Appliance  What is oral appliance therapy?  An oral appliance device fits on your teeth at night like a retainer used after having braces. The device is made by a specialized dentist and requires several visits over 1-2 months before a manufactured device is made to fit your teeth and is adjusted to prevent your sleep apnea. Once an oral device is working properly, snoring should be improved. A home sleep test may be recommended at that time if to determine whether the sleep apnea is adequately treated.       Some things to remember:  -Oral devices are often, but not always, covered by your medical insurance. Be sure to check with your insurance provider.   -If you are referred for oral therapy, you will be given a list of specialized dentists to consider or you may choose to visit the Web site of the American Academy of Dental Sleep Medicine  -Oral devices are less likely to work if you have severe sleep apnea or are extremely  overweight.     More detailed information  An oral appliance is a small acrylic device that fits over the upper and lower teeth  (similar to a retainer or a mouth guard). This device slightly moves jaw forward, which moves the base of the tongue forward, opens the airway, improves breathing for effective treat snoring and obstructive sleep apnea in perhaps 7 out of 10 people .  The best working devices are custom-made by a dental device  after a mold is made of the teeth 1, 2, 3.  When is an oral appliance indicated?  Oral appliance therapy is recommended as a first-line treatment for patients with primary snoring, mild sleep apnea, and for patients with moderate sleep apnea who prefer appliance therapy to use of CPAP4, 5. Severity of sleep apnea is determined by sleep testing and is based on the number of respiratory events per hour of sleep.   How successful is oral appliance therapy?  The success rate of oral appliance therapy in patients with mild sleep apnea is 75-80% while in patients with moderate sleep apnea it is 50-70%. The chance of success in patients with severe sleep apnea is 40-50%. The research also shows that oral appliances have a beneficial effect on the cardiovascular health of RAFA patients at the same magnitude as CPAP therapy7.  Oral appliances should be a second-line treatment in cases of severe sleep apnea, but if not completely successful then a combination therapy utilizing CPAP plus oral appliance therapy may be effective. Oral appliances tend to be effective in a broad range of patients although studies show that the patients who have the highest success are females, younger patients, those with milder disease, and less severe obesity. 3, 6.   Finding a dentist that practices dental sleep medicine  Specific training is available through the American Academy of Dental Sleep Medicine for dentists interested in working in the field of sleep. To find a dentist who is educated in  the field of sleep and the use of oral appliances, near you, visit the Web site of the American Academy of Dental Sleep Medicine.    References  1. Ana, et al. Objectively measured vs self-reported compliance during oral appliance therapy for sleep-disordered breathing. Chest 2013; 144(5): 3942-4098.  2. Ruslan et al. Objective measurement of compliance during oral appliance therapy for sleep-disordered breathing. Thorax 2013; 68(1): 91-96.  3. Rhonda et al. Mandibular advancement devices in 620 men and women with RAFA and snoring: tolerability and predictors of treatment success. Chest 2004; 125: 2556-3049.  4. Tevin, et al. Oral appliances for snoring and RAFA: a review. Sleep 2006; 29: 244-262.  5. Anna et al. Oral appliance treatment for RAFA: an update. J Clin Sleep Med 2014; 10(2): 215-227.  6. Binh et al. Predictors of OSAH treatment outcome. J Dent Res 2007; 86: 1086-9893.      Weight Loss:   Your Body mass index is 19.53 kg/m .    Being overweight does not necessarily mean you will have health consequences.  Those who have BMI over 30 or over 27 with existing medical conditions carries greater risk.   Weight loss decreases severity of sleep apnea in most people with obesity. For those with mild obesity who have developed snoring with weight gain, even 15-30 pound weight loss can improve and occasionally milder eliminate sleep apnea.  Structured and life-long dietary and health habits are necessary to lose weight and keep healthier weight levels.     The Comprehensive Weight loss program offers all aspects of weight loss strategies including two Non-Surgical Weight Loss Programs: Medical Weight Management and our 24 Week Healthy Lifestyle Program:  Medical Weight Management: You will meet with a Medical Weight Management Provider, as well as a Registered Dietician. The program may include medication therapy, dietary education, recommended exercise and physical therapy programs,  monthly support group meetings, and possible psychological counseling. Follow up visits with the provider or dietician are scheduled based on your progress and needs.  24 Week Healthy Lifestyle Program: This unique program is designed to give you the support of weekly appointments and activities thru a 24-week period. It may include all of the components of the basic program (above), with the addition of 11 individual Health  Visits, 24-week access to the [employer] website for over 700 online classes, and monthly support group meetings. This program has an out-of-pocket expense of $499 to cover the items that can not be billed to insurance (health coaches and [employer] access), and is non-refundable/non-transferable (you may be able to use a Health Savings Account; ask your HSA provider). There may be an optional meal replacement plan prescribed as well.   Medication therapy has been approved for the treatment of sleep apnea: The FDA approved tirzepatide (ZEPBOUND) for moderate to severe sleep apnea (apnea-hypopnea index greater than or equal to 15) in patients with BMI of greater than or equal to 30, or BMI greater than or equal to 27 with at least 1 weight-related condition such as hypertension or dyslipidemia.  Surgical management achieves meaningful long-term weight loss and improvement in health risks in most patients with more severe obesity.      Sleep Apnea Surgery:    Surgery for obstructive sleep apnea is considered generally only when other therapies fail to work. Surgery may be discussed with you if you are having a difficult time tolerating CPAP and or when there is an abnormal structure that requires surgical correction.  Nose and throat surgeries often enlarge the airway to prevent collapse.  Most of these surgeries create pain for 1-2 weeks and up to half of the most common surgeries are not effective throughout life.  You should carefully discuss the benefits and drawbacks to surgery with your  sleep provider and surgeon to determine if it is the best solution for you.   More information  Surgery for RAFA is directed at areas that are responsible for narrowing or complete obstruction of the airway during sleep.  There are a wide range of procedures available to enlarge and/or stabilize the airway to prevent blockage of breathing in the three major areas where it can occur: the palate, tongue, and nasal regions.  Successful surgical treatment depends on the accurate identification of the factors responsible for obstructive sleep apnea in each person.  A personalized approach is required because there is no single treatment that works well for everyone.  Because of anatomic variation, consultation with an examination by a sleep surgeon is a critical first step in determining what surgical options are best for each patient.  In some cases, examination during sedation may be recommended in order to guide the selection of procedures.  Patients will be counseled about risks and benefits as well as the typical recovery course after surgery. Surgery is typically not a cure for a person s RAFA.  However, surgery will often significantly improve one s RAFA severity (termed  success rate ).  Even in the absence of a cure, surgery will decrease the cardiovascular risk associated with OSA7; improve overall quality of life8 (sleepiness, functionality, sleep quality, etc).      Palate Procedures:  Patients with RAFA often have narrowing of their airway in the region of their tonsils and uvula.  The goals of palate procedures are to widen the airway in this region as well as to help the tissues resist collapse.  Modern palate procedure techniques focus on tissue conservation and soft tissue rearrangement, rather than tissue removal.  Often the uvula is preserved in this procedure. Residual sleep apnea is common in patient after pharyngoplasty with an average reduction in sleep apnea events of 33%2.      Tongue  Procedures:  ExamWhile patients are awake, the muscles that surround the throat are active and keep this region open for breathing. These muscles relax during sleep, allowing the tongue and other structures to collapse and block breathing.  There are several different tongue procedures available.  Selection of a tongue base procedure depends on characteristics seen on physical exam.  Generally, procedures are aimed at removing bulky tissues in this area or preventing the back of the tongue from falling back during sleep.  Success rates for tongue surgery range from 50-62%3.    Hypoglossal Nerve Stimulation:  Hypoglossal nerve stimulation has recently received approval from the [place] Food and Drug Administration for the treatment of obstructive sleep apnea.  This is based on research showing that the system was safe and effective in treating sleep apnea6.  Results showed that the median AHI score decreased 68%, from 29.3 to 9.0. This therapy uses an implant system that senses breathing patterns and delivers mild stimulation to airway muscles, which keeps the airway open during sleep.  The system consists of three fully implanted components: a small generator (similar in size to a pacemaker), a breathing sensor, and a stimulation lead.  Using a small handheld remote, a patient turns the therapy on before bed and off upon awakening.    Candidates for this device must be greater than 18 years of age, have moderate to severe obstructive sleep apnea with less than 25% central events  (AHI between 15-65), BMI less than 35, have tried CPAP/oral appliance for at least 8 weeks without success, and have appropriate upper airway anatomy (determined by a sleep endoscopy performed by Dr. Sudhakar Marie or Dr. Eriberto Bolivar).     Nasal Procedures:  Nasal obstruction can interfere with nasal breathing during the day and night.  Studies have shown that relief of nasal obstruction can improve the ability of some patients to  tolerate positive airway pressure therapy for obstructive sleep apnea1.  Treatment options include medications such as nasal saline, topical corticosteroid and antihistamine sprays, and oral medications such as antihistamines or decongestants. Non-surgical treatments can include external nasal dilators for selected patients. If these are not successful by themselves, surgery can improve the nasal airway either alone or in combination with these other options.        Combination Procedures:  Combination of surgical procedures and other treatments may be recommended, particularly if patients have more than one area of narrowing or persistent positional disease.  The success rate of combination surgery ranges from 66-80%2,3.    References  Jose VILLATORO. The Role of the Nose in Snoring and Obstructive Sleep Apnoea: An Update.  Eur Arch Otorhinolaryngol. 2011; 268: 1365-73.   Efren SM; Efraín JA; Aleksey JR; Pallanch JF; Jeevan MB; Freddy SG; Roula VERGARA. Surgical modifications of the upper airway for obstructive sleep apnea in adults: a systematic review and meta-analysis. SLEEP 2010;33(10):5714-8569. Ani HEAD. Hypopharyngeal surgery in obstructive sleep apnea: an evidence-based medicine review.  Arch Otolaryngol Head Neck Surg. 2006 Feb;132(2):206-13.  Shiv YH1, Junior Y, Rios HALEY. The efficacy of anatomically based multilevel surgery for obstructive sleep apnea. Otolaryngol Head Neck Surg. 2003 Oct;129(4):327-35.  Ani HEAD, Goldberg A. Hypopharyngeal Surgery in Obstructive Sleep Apnea: An Evidence-Based Medicine Review. Arch Otolaryngol Head Neck Surg. 2006 Feb;132(2):206-13.  Leanne BLANDON et al. Upper-Airway Stimulation for Obstructive Sleep Apnea.  N Engl J Med. 2014 Jan 9;370(2):139-49.  Felisha Y et al. Increased Incidence of Cardiovascular Disease in Middle-aged Men with Obstructive Sleep Apnea. Am J Respir Crit Care Med; 2002 166: 159-165  Palomo CASTELLON et al. Studying Life Effects and Effectiveness of  Palatopharyngoplasty (SLEEP) study: Subjective Outcomes of Isolated Uvulopalatopharyngoplasty. Otolaryngol Head Neck Surg. 2011; 144: 623-631.        WHAT IF I ONLY HAVE SNORING?    Mandibular advancement devices, lateral sleep positioning, long-term weight loss and treatment of nasal allergies have been shown to improve snoring.  Exercising tongue muscles with a game (https://apps.[employer]illaway/us/vidya/Plyce-reduce-snoring/db6565065315) or stimulating the tongue during the day with a device (https://doi.org/10.3390/bbb73389961) have improved snoring in some individuals.  https://www.[employer]Go [employer]/  https://www.sleepfoundation.org/best-anti-snoring-mouthpieces-and-mouthguards    Remember to Drive Safe... Drive Alive     Sleep health profoundly affects your health, mood, and your safety.  Thirty three percent of the population (one in three of us) is not getting enough sleep and many have a sleep disorder. Not getting enough sleep or having an untreated / undertreated sleep condition may make us sleepy without even knowing it. In fact, our driving could be dramatically impaired due to our sleep health. As your provider, here are some things I would like you to know about driving:     Here are some warning signs for impairment and dangerous drowsy driving:              -Having been awake more than 16 hours               -Looking tired               -Eyelid drooping              -Head nodding (it could be too late at this point)              -Driving for more than 30 minutes     Some things you could do to make the driving safer if you are experiencing some drowsiness:              -Stop driving and rest              -Call for transportation              -Make sure your sleep disorder is adequately treated     Some things that have been shown NOT to work when experiencing drowsiness while driving:              -Turning on the radio              -Opening windows              -Eating any  distracting  /  entertaining   foods (e.g., sunflower seeds, candy, or any other)              -Talking on the phone      Your decision may not only impact your life, but also the life of others. Please, remember to drive safe for yourself and all of us.

## 2025-05-01 NOTE — PROGRESS NOTES
Virtual Visit Details    Type of service:  Video Visit 7:25 AM- 8:16 AM     Originating Location (pt. Location): Home    Distant Location (provider location):  Off-site  Platform used for Video Visit: Angi

## 2025-06-21 ENCOUNTER — HEALTH MAINTENANCE LETTER (OUTPATIENT)
Age: 45
End: 2025-06-21

## 2025-07-07 ENCOUNTER — TELEPHONE (OUTPATIENT)
Dept: SURGERY | Facility: CLINIC | Age: 45
End: 2025-07-07
Payer: COMMERCIAL

## 2025-07-07 NOTE — TELEPHONE ENCOUNTER
"[facility] Call Center    Phone Message    May a detailed message be left on voicemail: no     Reason for Call: Received online appointment request for \"Painful and large internal & external hemmorhoids.\". LVM to call for scheduling.  "

## 2025-07-15 NOTE — TELEPHONE ENCOUNTER
Action 7/16/25 - E[facility]   Action Taken - called pt and left VM     Diagnosis, Referred by & from: Hemorrhoids // per pt // self referred // epic    Appt date: 8/12/25   NOTES STATUS DETAILS   OFFICE NOTE from referring provider N/A    OFFICE NOTE from other specialist Internal [facility]:  6/5/19 - [facility] w/ Elena Robles MD    DISCHARGE SUMMARY from hospital N/A    DISCHARGE REPORT from the ER N/A    OPERATIVE REPORT N/A    MEDICATION LIST Internal    LABS     ANAL PAP/CEA N/A    BIOPSIES/PATHOLOGY RELATED TO DIAGNOSIS N/A    DIAGNOSTIC PROCEDURES     PFC TESTING (from the Pelvic floor center includes Manometry, PDNL, EMG, etc.) N/A    COLONOSCOPY (most recent all time after 5 years) N/A    FLEX SIGMOIDOSCOPY N/A    UPPER ENDOSCOPY (EGD) Internal [facility]:  8/6/2009   ERCP N/A    IMAGING (DISC & REPORT)      CT N/A    MRI N/A    XRAY N/A    ULTRASOUND  (ENDOANAL/ENDORECTAL) N/A

## 2025-07-21 ENCOUNTER — TELEPHONE (OUTPATIENT)
Dept: SLEEP MEDICINE | Facility: CLINIC | Age: 45
End: 2025-07-21
Payer: COMMERCIAL

## 2025-07-21 NOTE — TELEPHONE ENCOUNTER
Pt was called to reschedule hst appt on 7/29-7/30. LVM with phone number to call asking them to call back.

## 2025-08-10 ENCOUNTER — MYC MEDICAL ADVICE (OUTPATIENT)
Dept: CARDIOLOGY | Facility: CLINIC | Age: 45
End: 2025-08-10
Payer: COMMERCIAL

## 2025-08-10 DIAGNOSIS — R00.2 PALPITATIONS: Primary | ICD-10-CM

## 2025-08-10 DIAGNOSIS — I47.10 SVT (SUPRAVENTRICULAR TACHYCARDIA): ICD-10-CM

## 2025-08-12 ENCOUNTER — PRE VISIT (OUTPATIENT)
Dept: SURGERY | Facility: CLINIC | Age: 45
End: 2025-08-12

## 2025-08-12 ENCOUNTER — OFFICE VISIT (OUTPATIENT)
Dept: SURGERY | Facility: CLINIC | Age: 45
End: 2025-08-12
Payer: COMMERCIAL

## 2025-08-12 VITALS — SYSTOLIC BLOOD PRESSURE: 110 MMHG | OXYGEN SATURATION: 96 % | HEART RATE: 100 BPM | DIASTOLIC BLOOD PRESSURE: 65 MMHG

## 2025-08-12 DIAGNOSIS — K21.9 GASTROESOPHAGEAL REFLUX DISEASE, UNSPECIFIED WHETHER ESOPHAGITIS PRESENT: ICD-10-CM

## 2025-08-12 DIAGNOSIS — K64.8 HEMORRHOID PROLAPSE: ICD-10-CM

## 2025-08-12 DIAGNOSIS — K62.5 RECTAL BLEEDING: Primary | ICD-10-CM

## 2025-08-12 PROCEDURE — 1126F AMNT PAIN NOTED NONE PRSNT: CPT | Performed by: NURSE PRACTITIONER

## 2025-08-12 PROCEDURE — 3074F SYST BP LT 130 MM HG: CPT | Performed by: NURSE PRACTITIONER

## 2025-08-12 PROCEDURE — 99203 OFFICE O/P NEW LOW 30 MIN: CPT | Mod: 25 | Performed by: NURSE PRACTITIONER

## 2025-08-12 PROCEDURE — 3078F DIAST BP <80 MM HG: CPT | Performed by: NURSE PRACTITIONER

## 2025-08-12 PROCEDURE — 46221 LIGATION OF HEMORRHOID(S): CPT | Performed by: NURSE PRACTITIONER

## 2025-08-12 ASSESSMENT — PAIN SCALES - GENERAL: PAINLEVEL_OUTOF10: NO PAIN (0)

## 2025-08-19 ENCOUNTER — OFFICE VISIT (OUTPATIENT)
Dept: SLEEP MEDICINE | Facility: CLINIC | Age: 45
End: 2025-08-19
Payer: COMMERCIAL

## 2025-08-19 DIAGNOSIS — R29.818 SUSPECTED SLEEP APNEA: ICD-10-CM

## 2025-08-19 DIAGNOSIS — F51.04 PSYCHOPHYSIOLOGICAL INSOMNIA: ICD-10-CM

## 2025-08-19 DIAGNOSIS — R06.81 APNEA: ICD-10-CM

## 2025-08-19 DIAGNOSIS — R00.2 PALPITATIONS: ICD-10-CM

## 2025-08-19 DIAGNOSIS — F90.2 ATTENTION DEFICIT HYPERACTIVITY DISORDER (ADHD), COMBINED TYPE: ICD-10-CM

## 2025-08-19 ASSESSMENT — SLEEP AND FATIGUE QUESTIONNAIRES
HOW LIKELY ARE YOU TO NOD OFF OR FALL ASLEEP WHILE SITTING AND TALKING TO SOMEONE: WOULD NEVER DOZE
HOW LIKELY ARE YOU TO NOD OFF OR FALL ASLEEP WHILE WATCHING TV: SLIGHT CHANCE OF DOZING
HOW LIKELY ARE YOU TO NOD OFF OR FALL ASLEEP IN A CAR, WHILE STOPPED FOR A FEW MINUTES IN TRAFFIC: WOULD NEVER DOZE
HOW LIKELY ARE YOU TO NOD OFF OR FALL ASLEEP WHILE SITTING INACTIVE IN A PUBLIC PLACE: WOULD NEVER DOZE
HOW LIKELY ARE YOU TO NOD OFF OR FALL ASLEEP WHEN YOU ARE A PASSENGER IN A CAR FOR AN HOUR WITHOUT A BREAK: SLIGHT CHANCE OF DOZING
HOW LIKELY ARE YOU TO NOD OFF OR FALL ASLEEP WHILE LYING DOWN TO REST IN THE AFTERNOON WHEN CIRCUMSTANCES PERMIT: HIGH CHANCE OF DOZING
HOW LIKELY ARE YOU TO NOD OFF OR FALL ASLEEP WHILE SITTING QUIETLY AFTER LUNCH WITHOUT ALCOHOL: HIGH CHANCE OF DOZING
HOW LIKELY ARE YOU TO NOD OFF OR FALL ASLEEP WHILE SITTING AND READING: SLIGHT CHANCE OF DOZING

## 2025-08-20 ENCOUNTER — DOCUMENTATION ONLY (OUTPATIENT)
Dept: SLEEP MEDICINE | Facility: CLINIC | Age: 45
End: 2025-08-20
Payer: COMMERCIAL

## 2025-08-20 RX ORDER — METOPROLOL SUCCINATE 50 MG/1
50 TABLET, EXTENDED RELEASE ORAL DAILY
Qty: 90 TABLET | Refills: 0 | Status: SHIPPED | OUTPATIENT
Start: 2025-08-20

## 2025-09-02 ENCOUNTER — TELEPHONE (OUTPATIENT)
Dept: GASTROENTEROLOGY | Facility: CLINIC | Age: 45
End: 2025-09-02
Payer: COMMERCIAL